# Patient Record
Sex: FEMALE | Race: WHITE | NOT HISPANIC OR LATINO | Employment: FULL TIME | URBAN - NONMETROPOLITAN AREA
[De-identification: names, ages, dates, MRNs, and addresses within clinical notes are randomized per-mention and may not be internally consistent; named-entity substitution may affect disease eponyms.]

---

## 2017-06-21 ENCOUNTER — OFFICE VISIT (OUTPATIENT)
Dept: FAMILY MEDICINE | Facility: OTHER | Age: 52
End: 2017-06-21
Attending: PHYSICIAN ASSISTANT
Payer: COMMERCIAL

## 2017-06-21 VITALS
HEART RATE: 66 BPM | HEIGHT: 68 IN | BODY MASS INDEX: 28.49 KG/M2 | DIASTOLIC BLOOD PRESSURE: 58 MMHG | WEIGHT: 188 LBS | TEMPERATURE: 98.2 F | OXYGEN SATURATION: 98 % | RESPIRATION RATE: 16 BRPM | SYSTOLIC BLOOD PRESSURE: 104 MMHG

## 2017-06-21 DIAGNOSIS — Z71.89 ACP (ADVANCE CARE PLANNING): Chronic | ICD-10-CM

## 2017-06-21 DIAGNOSIS — N94.6 DYSMENORRHEA: ICD-10-CM

## 2017-06-21 DIAGNOSIS — Z01.419 WELL WOMAN EXAM WITH ROUTINE GYNECOLOGICAL EXAM: ICD-10-CM

## 2017-06-21 DIAGNOSIS — Z23 IMMUNIZATION DUE: Primary | ICD-10-CM

## 2017-06-21 DIAGNOSIS — G47.00 INSOMNIA, UNSPECIFIED TYPE: ICD-10-CM

## 2017-06-21 LAB
ALBUMIN SERPL-MCNC: 3.9 G/DL (ref 3.4–5)
ALBUMIN UR-MCNC: NEGATIVE MG/DL
ALP SERPL-CCNC: 98 U/L (ref 40–150)
ALT SERPL W P-5'-P-CCNC: 33 U/L (ref 0–50)
ANION GAP SERPL CALCULATED.3IONS-SCNC: 6 MMOL/L (ref 3–14)
APPEARANCE UR: CLEAR
AST SERPL W P-5'-P-CCNC: 22 U/L (ref 0–45)
BACTERIA #/AREA URNS HPF: ABNORMAL /HPF
BASOPHILS # BLD AUTO: 0.1 10E9/L (ref 0–0.2)
BASOPHILS NFR BLD AUTO: 0.7 %
BILIRUB SERPL-MCNC: 0.4 MG/DL (ref 0.2–1.3)
BILIRUB UR QL STRIP: NEGATIVE
BUN SERPL-MCNC: 18 MG/DL (ref 7–30)
CALCIUM SERPL-MCNC: 8.6 MG/DL (ref 8.5–10.1)
CHLORIDE SERPL-SCNC: 106 MMOL/L (ref 94–109)
CHOLEST SERPL-MCNC: 170 MG/DL
CO2 SERPL-SCNC: 29 MMOL/L (ref 20–32)
COLOR UR AUTO: ABNORMAL
CREAT SERPL-MCNC: 0.78 MG/DL (ref 0.52–1.04)
DIFFERENTIAL METHOD BLD: NORMAL
EOSINOPHIL # BLD AUTO: 0.1 10E9/L (ref 0–0.7)
EOSINOPHIL NFR BLD AUTO: 2 %
ERYTHROCYTE [DISTWIDTH] IN BLOOD BY AUTOMATED COUNT: 12.7 % (ref 10–15)
GFR SERPL CREATININE-BSD FRML MDRD: 77 ML/MIN/1.7M2
GLUCOSE SERPL-MCNC: 88 MG/DL (ref 70–99)
GLUCOSE UR STRIP-MCNC: NEGATIVE MG/DL
HCT VFR BLD AUTO: 39.9 % (ref 35–47)
HDLC SERPL-MCNC: 67 MG/DL
HGB BLD-MCNC: 13.9 G/DL (ref 11.7–15.7)
HGB UR QL STRIP: ABNORMAL
IMM GRANULOCYTES # BLD: 0 10E9/L (ref 0–0.4)
IMM GRANULOCYTES NFR BLD: 0.3 %
KETONES UR STRIP-MCNC: NEGATIVE MG/DL
LDLC SERPL CALC-MCNC: 94 MG/DL
LEUKOCYTE ESTERASE UR QL STRIP: NEGATIVE
LYMPHOCYTES # BLD AUTO: 1.5 10E9/L (ref 0.8–5.3)
LYMPHOCYTES NFR BLD AUTO: 20.9 %
MCH RBC QN AUTO: 30.3 PG (ref 26.5–33)
MCHC RBC AUTO-ENTMCNC: 34.8 G/DL (ref 31.5–36.5)
MCV RBC AUTO: 87 FL (ref 78–100)
MICRO REPORT STATUS: NORMAL
MONOCYTES # BLD AUTO: 0.4 10E9/L (ref 0–1.3)
MONOCYTES NFR BLD AUTO: 6 %
NEUTROPHILS # BLD AUTO: 5 10E9/L (ref 1.6–8.3)
NEUTROPHILS NFR BLD AUTO: 70.1 %
NITRATE UR QL: NEGATIVE
NONHDLC SERPL-MCNC: 103 MG/DL
NRBC # BLD AUTO: 0 10*3/UL
NRBC BLD AUTO-RTO: 0 /100
PH UR STRIP: 7 PH (ref 4.7–8)
PLATELET # BLD AUTO: 245 10E9/L (ref 150–450)
POTASSIUM SERPL-SCNC: 3.9 MMOL/L (ref 3.4–5.3)
PROT SERPL-MCNC: 7.1 G/DL (ref 6.8–8.8)
RBC # BLD AUTO: 4.58 10E12/L (ref 3.8–5.2)
RBC #/AREA URNS AUTO: 4 /HPF (ref 0–2)
SODIUM SERPL-SCNC: 141 MMOL/L (ref 133–144)
SP GR UR STRIP: 1.01 (ref 1–1.03)
SPECIMEN SOURCE: NORMAL
TRIGL SERPL-MCNC: 45 MG/DL
TSH SERPL DL<=0.005 MIU/L-ACNC: 2.31 MU/L (ref 0.4–4)
URN SPEC COLLECT METH UR: ABNORMAL
UROBILINOGEN UR STRIP-MCNC: NORMAL MG/DL (ref 0–2)
WBC # BLD AUTO: 7.2 10E9/L (ref 4–11)
WBC #/AREA URNS AUTO: <1 /HPF (ref 0–2)
WET PREP SPEC: NORMAL

## 2017-06-21 PROCEDURE — 87624 HPV HI-RISK TYP POOLED RSLT: CPT | Mod: 90 | Performed by: PHYSICIAN ASSISTANT

## 2017-06-21 PROCEDURE — 90471 IMMUNIZATION ADMIN: CPT | Performed by: PHYSICIAN ASSISTANT

## 2017-06-21 PROCEDURE — 90715 TDAP VACCINE 7 YRS/> IM: CPT | Performed by: PHYSICIAN ASSISTANT

## 2017-06-21 PROCEDURE — 99000 SPECIMEN HANDLING OFFICE-LAB: CPT | Performed by: PHYSICIAN ASSISTANT

## 2017-06-21 PROCEDURE — G0123 SCREEN CERV/VAG THIN LAYER: HCPCS | Performed by: PHYSICIAN ASSISTANT

## 2017-06-21 PROCEDURE — 80061 LIPID PANEL: CPT | Performed by: PHYSICIAN ASSISTANT

## 2017-06-21 PROCEDURE — 36415 COLL VENOUS BLD VENIPUNCTURE: CPT | Performed by: PHYSICIAN ASSISTANT

## 2017-06-21 PROCEDURE — 99396 PREV VISIT EST AGE 40-64: CPT | Mod: 25 | Performed by: PHYSICIAN ASSISTANT

## 2017-06-21 PROCEDURE — 87210 SMEAR WET MOUNT SALINE/INK: CPT | Performed by: PHYSICIAN ASSISTANT

## 2017-06-21 PROCEDURE — 80050 GENERAL HEALTH PANEL: CPT | Performed by: PHYSICIAN ASSISTANT

## 2017-06-21 PROCEDURE — 86803 HEPATITIS C AB TEST: CPT | Mod: 90 | Performed by: PHYSICIAN ASSISTANT

## 2017-06-21 PROCEDURE — 81001 URINALYSIS AUTO W/SCOPE: CPT | Performed by: PHYSICIAN ASSISTANT

## 2017-06-21 RX ORDER — ZOLPIDEM TARTRATE 6.25 MG/1
6.25 TABLET, FILM COATED, EXTENDED RELEASE ORAL
Qty: 30 TABLET | Refills: 0 | Status: ON HOLD | OUTPATIENT
Start: 2017-06-21 | End: 2017-08-26

## 2017-06-21 ASSESSMENT — PATIENT HEALTH QUESTIONNAIRE - PHQ9: 5. POOR APPETITE OR OVEREATING: NOT AT ALL

## 2017-06-21 ASSESSMENT — ANXIETY QUESTIONNAIRES
6. BECOMING EASILY ANNOYED OR IRRITABLE: NOT AT ALL
7. FEELING AFRAID AS IF SOMETHING AWFUL MIGHT HAPPEN: NOT AT ALL
3. WORRYING TOO MUCH ABOUT DIFFERENT THINGS: NOT AT ALL
5. BEING SO RESTLESS THAT IT IS HARD TO SIT STILL: NOT AT ALL
2. NOT BEING ABLE TO STOP OR CONTROL WORRYING: NOT AT ALL
1. FEELING NERVOUS, ANXIOUS, OR ON EDGE: NOT AT ALL
GAD7 TOTAL SCORE: 0

## 2017-06-21 ASSESSMENT — PAIN SCALES - GENERAL: PAINLEVEL: NO PAIN (0)

## 2017-06-21 NOTE — NURSING NOTE
"Chief Complaint   Patient presents with     Physical       Initial /58 (BP Location: Left arm, Patient Position: Chair, Cuff Size: Adult Regular)  Pulse 66  Temp 98.2  F (36.8  C) (Tympanic)  Resp 16  Ht 5' 7.5\" (1.715 m)  Wt 188 lb (85.3 kg)  LMP 06/15/2017 (Exact Date)  SpO2 98%  BMI 29.01 kg/m2 Estimated body mass index is 29.01 kg/(m^2) as calculated from the following:    Height as of this encounter: 5' 7.5\" (1.715 m).    Weight as of this encounter: 188 lb (85.3 kg).  Medication Reconciliation: gem Peterson LPN    "
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
## 2017-06-21 NOTE — MR AVS SNAPSHOT
After Visit Summary   6/21/2017    Shweta Harris    MRN: [id number]           Patient Information     Date Of Birth          1965        Visit Information        Provider Department      6/21/2017 9:00 AM Antionette Edward PA [facility]        Today's Diagnoses     Immunization due    -  1    Well woman exam with routine gynecological exam        ACP (advance care planning)          Care Instructions      Preventive Health Recommendations  Female Ages 50 - 64    Yearly exam: See your health care provider every year in order to  o Review health changes.   o Discuss preventive care.    o Review your medicines if your doctor has prescribed any.      Get a Pap test every three years (unless you have an abnormal result and your provider advises testing more often).    If you get Pap tests with HPV test, you only need to test every 5 years, unless you have an abnormal result.     You do not need a Pap test if your uterus was removed (hysterectomy) and you have not had cancer.    You should be tested each year for STDs (sexually transmitted diseases) if you're at risk.     Have a mammogram every 1 to 2 years.    Have a colonoscopy at age 50, or have a yearly FIT test (stool test). These exams screen for colon cancer.      Have a cholesterol test every 5 years, or more often if advised.    Have a diabetes test (fasting glucose) every three years. If you are at risk for diabetes, you should have this test more often.     If you are at risk for osteoporosis (brittle bone disease), think about having a bone density scan (DEXA).    Shots: Get a flu shot each year. Get a tetanus shot every 10 years.    Nutrition:     Eat at least 5 servings of fruits and vegetables each day.    Eat whole-grain bread, whole-wheat pasta and brown rice instead of white grains and rice.    Talk to your provider about Calcium and Vitamin D.     Lifestyle    Exercise at least 150 minutes a week (30 minutes a day,  "5 days a week). This will help you control your weight and prevent disease.    Limit alcohol to one drink per day.    No smoking.     Wear sunscreen to prevent skin cancer.     See your dentist every six months for an exam and cleaning.    See your eye doctor every 1 to 2 years.            Follow-ups after your visit        Who to contact     If you have questions or need follow up information about today's clinic visit or your schedule please contact [facility] directly at [phone].  Normal or non-critical lab and imaging results will be communicated to you by [employer], letter or phone within 4 business days after the clinic has received the results. If you do not hear from us within 7 days, please contact the clinic through [employer] or phone. If you have a critical or abnormal lab result, we will notify you by phone as soon as possible.  Submit refill requests through [employer] or call your pharmacy and they will forward the refill request to us. Please allow 3 business days for your refill to be completed.          Additional Information About Your Visit        [employer] Information     [employer] gives you secure access to your electronic health record. If you see a primary care provider, you can also send messages to your care team and make appointments. If you have questions, please call your primary care clinic.  If you do not have a primary care provider, please call [phone] and they will assist you.        Care EveryWhere ID     This is your Care EveryWhere ID. This could be used by other organizations to access your [facility] medical records  [id number]        Your Vitals Were     Pulse Temperature Respirations Height Last Period Pulse Oximetry    66 98.2  F (36.8  C) (Tympanic) 16 5' 7.5\" (1.715 m) 06/15/2017 (Exact Date) 98%    BMI (Body Mass Index)                   29.01 kg/m2            Blood Pressure from Last 3 Encounters:   06/21/17 104/58   10/11/16 102/64   08/27/15 90/64    Weight " from Last 3 Encounters:   06/21/17 188 lb (85.3 kg)   10/11/16 190 lb (86.2 kg)   08/27/15 178 lb (80.7 kg)              We Performed the Following     A pap thin layer screen with  HPV - recommended age 30 - 65 years (select HPV order below)     CBC with platelets differential     Comprehensive metabolic panel     Hepatitis C antibody     Lipid Profile (RANGE)     TDAP VACCINE (ADACEL)     TSH with free T4 reflex     UA with Microscopic reflex to Culture     VACCINE ADMINISTRATION, INITIAL        Primary Care Provider Office Phone # Fax #    DESTIN Zuleta [phone]       [facility] [address]  [place] [address]        Equal Access to Services     JEANNE SOLIZ : Hadii merritt smith Sostephen, waaxda luqadaha, qaybta kaalmada adejoseph, ángela stephens . So [facility] [phone].    ATENCIÓN: Si habla español, tiene a boone disposición servicios gratuitos de asistencia lingüística. Llame al [phone].    We comply with applicable federal civil rights laws and [place] laws. We do not discriminate on the basis of race, color, national origin, age, disability sex, sexual orientation or gender identity.            Thank you!     Thank you for [facility]  for your [facility]. Our goal is always to provide you with excellent care. Hearing back from our patients is one way we can continue to improve our services. Please take a few minutes to complete the written survey that you may receive in the mail after your visit with us. Thank you!             Your Updated Medication List - Protect others around you: Learn how to safely use, store and throw away your medicines at www.disposemymeds.org.          This list is accurate as of: 6/21/17  9:47 AM.  Always use your most recent med list.                   Brand Name Dispense Instructions for use Diagnosis    zolpidem 6.25 MG CR tablet    AMBIEN CR    15 tablet    Take 1 tablet (6.25 mg) by  mouth nightly as needed for sleep    Insomnia, unspecified type

## 2017-06-22 ASSESSMENT — PATIENT HEALTH QUESTIONNAIRE - PHQ9: SUM OF ALL RESPONSES TO PHQ QUESTIONS 1-9: 4

## 2017-06-22 ASSESSMENT — ANXIETY QUESTIONNAIRES: GAD7 TOTAL SCORE: 0

## 2017-06-23 LAB — HCV AB SERPL QL IA: NORMAL

## 2017-06-27 LAB
COPATH REPORT: NORMAL
PAP: NORMAL

## 2017-06-28 LAB
FINAL DIAGNOSIS: NORMAL
HPV HR 12 DNA CVX QL NAA+PROBE: NEGATIVE
HPV16 DNA SPEC QL NAA+PROBE: NEGATIVE
HPV18 DNA SPEC QL NAA+PROBE: NEGATIVE
SPECIMEN DESCRIPTION: NORMAL

## 2017-07-07 ENCOUNTER — OFFICE VISIT (OUTPATIENT)
Dept: OBGYN | Facility: OTHER | Age: 52
End: 2017-07-07
Attending: OBSTETRICS & GYNECOLOGY
Payer: COMMERCIAL

## 2017-07-07 VITALS
DIASTOLIC BLOOD PRESSURE: 70 MMHG | SYSTOLIC BLOOD PRESSURE: 108 MMHG | HEART RATE: 68 BPM | OXYGEN SATURATION: 98 % | HEIGHT: 68 IN | BODY MASS INDEX: 28.49 KG/M2 | WEIGHT: 188 LBS

## 2017-07-07 DIAGNOSIS — N94.6 DYSMENORRHEA: ICD-10-CM

## 2017-07-07 PROCEDURE — 99213 OFFICE O/P EST LOW 20 MIN: CPT | Performed by: OBSTETRICS & GYNECOLOGY

## 2017-07-07 ASSESSMENT — PAIN SCALES - GENERAL: PAINLEVEL: NO PAIN (0)

## 2017-07-07 NOTE — PROGRESS NOTES
S:   Painful periods for a few years         Dyspareunia few years         Pelvic pain also in between periods           2 Children   Ages 38 and 36    Medical:  Nil of note  Surgical:  Tubal occlusion                   Colonoscopy   Diverticulitis    Occupation:  [employer]  Smokin  Alcohol:       0    O:  GC good       No pallor no cyanosis no jaundice       Abdomen soft  No mass liver spleen and kidneys not palpable    VE:  Vulva and vagina normal          Cervix grossly normal            Uterus anteverted normal size            Adnexa not enlarged and not tender    A:   Pelvic pain cause uncertain ? Endometriosis    P:    Pelvic ultrasound         Appointment to see me after ultrasound to discuss possible further Rx ? laparoscopy

## 2017-07-07 NOTE — MR AVS SNAPSHOT
"              After Visit Summary   7/7/2017    Shweta Harris    MRN: [id number]           Patient Information     Date Of Birth          1965        Visit Information        Provider Department      7/7/2017 11:00 AM Santhosh May MD [facility]        Today's Diagnoses     Dysmenorrhea           Follow-ups after your visit        Follow-up notes from your care team     Return in about 3 weeks (around 7/28/2017).      Who to contact     If you have questions or need follow up information about today's clinic visit or your schedule please contact [facility] directly at [phone].  Normal or non-critical lab and imaging results will be communicated to you by MyChart, letter or phone within 4 business days after the clinic has received the results. If you do not hear from us within 7 days, please contact the clinic through [employer] or phone. If you have a critical or abnormal lab result, we will notify you by phone as soon as possible.  Submit refill requests through [employer] or call your pharmacy and they will forward the refill request to us. Please allow 3 business days for your refill to be completed.          Additional Information About Your Visit        MyChart Information     State gives you secure access to your electronic health record. If you see a primary care provider, you can also send messages to your care team and make appointments. If you have questions, please call your primary care clinic.  If you do not have a primary care provider, please call [phone] and they will assist you.        Care EveryWhere ID     This is your Care EveryWhere ID. This could be used by other organizations to access your [facility] medical records  [id number]        Your Vitals Were     Pulse Height Last Period Pulse Oximetry BMI (Body Mass Index)       68 5' 7.5\" (1.715 m) 06/15/2017 (Exact Date) 98% 29.01 kg/m2        Blood Pressure from Last 3 Encounters:   07/07/17 108/70 "   06/21/17 104/58   10/11/16 102/64    Weight from Last 3 Encounters:   07/07/17 188 lb (85.3 kg)   06/21/17 188 lb (85.3 kg)   10/11/16 190 lb (86.2 kg)               Primary Care Provider Office Phone # Fax #    Antionette GLOVER DESTIN Edward [phone]       [facility] [address]  [place] [address]        Equal Access to Services     JEANNE SOLIZ : Hadii aad ku hadasho Soomaali, waaxda luqadaha, qaybta kaalmada adeegyada, waxay idiin hayaan adeeg kharash la'aan . So [facility] [phone].    ATENCIÓN: Si habla español, tiene a boone disposición servicios gratuitos de asistencia lingüística. [facility] [phone].    We comply with applicable federal civil rights laws and [place] laws. We do not discriminate on the basis of race, color, national origin, age, disability sex, sexual orientation or gender identity.            Thank you!     Thank you for choosing [facility] [place]  for your care. Our goal is always to provide you with excellent care. Hearing back from our patients is one way we can continue to improve our services. Please take a few minutes to complete the written survey that you may receive in the mail after your visit with us. Thank you!             Your Updated Medication List - Protect others around you: Learn how to safely use, store and throw away your medicines at www.disposemymeds.org.          This list is accurate as of: 7/7/17  4:49 PM.  Always use your most recent med list.                   Brand Name Dispense Instructions for use Diagnosis    zolpidem 6.25 MG CR tablet    AMBIEN CR    30 tablet    Take 1 tablet (6.25 mg) by mouth nightly as needed for sleep    Insomnia, unspecified type

## 2017-07-07 NOTE — NURSING NOTE
"Chief Complaint   Patient presents with     Consult     Consult/[place]/Dysmenorrhea       Initial /70 (BP Location: Left arm, Patient Position: Chair, Cuff Size: Adult Regular)  Pulse 68  Ht 5' 7.5\" (1.715 m)  Wt 188 lb (85.3 kg)  LMP 06/15/2017 (Exact Date)  SpO2 98%  BMI 29.01 kg/m2 Estimated body mass index is 29.01 kg/(m^2) as calculated from the following:    Height as of this encounter: 5' 7.5\" (1.715 m).    Weight as of this encounter: 188 lb (85.3 kg).  Medication Reconciliation: gem Valdovinos      "

## 2017-07-10 ENCOUNTER — HOSPITAL ENCOUNTER (OUTPATIENT)
Dept: ULTRASOUND IMAGING | Facility: HOSPITAL | Age: 52
Discharge: HOME | End: 2017-07-10
Attending: OBSTETRICS & GYNECOLOGY | Admitting: OBSTETRICS & GYNECOLOGY
Payer: COMMERCIAL

## 2017-07-10 PROCEDURE — 76830 TRANSVAGINAL US NON-OB: CPT | Mod: TC

## 2017-07-10 PROCEDURE — 76856 US EXAM PELVIC COMPLETE: CPT | Mod: TC

## 2017-07-28 ENCOUNTER — OFFICE VISIT (OUTPATIENT)
Dept: OBGYN | Facility: OTHER | Age: 52
End: 2017-07-28
Attending: OBSTETRICS & GYNECOLOGY
Payer: COMMERCIAL

## 2017-07-28 VITALS
OXYGEN SATURATION: 98 % | HEIGHT: 68 IN | WEIGHT: 188 LBS | HEART RATE: 93 BPM | DIASTOLIC BLOOD PRESSURE: 62 MMHG | BODY MASS INDEX: 28.49 KG/M2 | SYSTOLIC BLOOD PRESSURE: 110 MMHG

## 2017-07-28 DIAGNOSIS — R93.89 ENDOMETRIAL THICKENING ON ULTRA SOUND: Primary | ICD-10-CM

## 2017-07-28 DIAGNOSIS — N94.6 DYSMENORRHEA: ICD-10-CM

## 2017-07-28 PROCEDURE — 88305 TISSUE EXAM BY PATHOLOGIST: CPT | Mod: TC | Performed by: OBSTETRICS & GYNECOLOGY

## 2017-07-28 PROCEDURE — 99213 OFFICE O/P EST LOW 20 MIN: CPT | Mod: 25 | Performed by: OBSTETRICS & GYNECOLOGY

## 2017-07-28 PROCEDURE — 58100 BIOPSY OF UTERUS LINING: CPT | Performed by: OBSTETRICS & GYNECOLOGY

## 2017-07-28 ASSESSMENT — PAIN SCALES - GENERAL: PAINLEVEL: MILD PAIN (2)

## 2017-07-28 NOTE — MR AVS SNAPSHOT
After Visit Summary   7/28/2017    Shweta Harris    MRN: [id number]           Patient Information     Date Of Birth          1965        Visit Information        Provider Department      7/28/2017 2:45 PM Santhosh May MD [facility]        Today's Diagnoses     Endometrial thickening on ultra sound    -  1    Dysmenorrhea           Follow-ups after your visit        Your next 10 appointments already scheduled     Aug 10, 2017  9:45 AM CDT   (Arrive by 9:30 AM)   Pre-Op physical with DESTIN Zuleta   [facility] [place] [facility]    [address]  [place] [address]   [phone]              Who to contact     If you have questions or need follow up information about today's clinic visit or your schedule please contact [facility] directly at [phone].  Normal or non-critical lab and imaging results will be communicated to you by MyChart, letter or phone within 4 business days after the clinic has received the results. If you do not hear from us within 7 days, please contact the clinic through MyChart or phone. If you have a critical or abnormal lab result, we will notify you by phone as soon as possible.  Submit refill requests through [employer] or call your pharmacy and they will forward the refill request to us. Please allow 3 business days for your refill to be completed.          Additional Information About Your Visit        MyChart Information     [employer] gives you secure access to your electronic health record. If you see a primary care provider, you can also send messages to your care team and make appointments. If you have questions, please call your primary care clinic.  If you do not have a primary care provider, please call [phone] and they will assist you.        Care EveryWhere ID     This is your Care EveryWhere ID. This could be used by other organizations to access your [facility]  "records  [id number]        Your Vitals Were     Pulse Height Last Period Pulse Oximetry BMI (Body Mass Index)       93 5' 7.5\" (1.715 m) 07/12/2017 98% 29.01 kg/m2        Blood Pressure from Last 3 Encounters:   07/28/17 110/62   07/07/17 108/70   06/21/17 104/58    Weight from Last 3 Encounters:   07/28/17 188 lb (85.3 kg)   07/07/17 188 lb (85.3 kg)   06/21/17 188 lb (85.3 kg)              We Performed the Following     ENDOMETRIAL BIOPSY W/O CERVICAL DILATION     Surgical pathology exam        Primary Care Provider Office Phone # Fax #    DESTIN Zuleta [phone]       [facility] [address] [facility] [address]  [facility] [address]        Equal Access to Services     JEANNE SOLIZ : Hadii aad ku hadasho Soomaali, waaxda luqadaha, qaybta kaalmada adeegyada, ángela rogersin hayildefonso stephens . So [facility] [phone].    ATENCIÓN: Si habla español, tiene a boone disposición servicios gratuitos de asistencia lingüística. Olaf al [phone].    We comply with applicable federal civil rights laws and [place] laws. We do not discriminate on the basis of race, color, national origin, age, disability sex, sexual orientation or gender identity.            Thank you!     Thank you for choosing [facility]  for your care. Our goal is always to provide you with excellent care. Hearing back from our patients is one way we can continue to improve our services. Please take a few minutes to complete the written survey that you may receive in the mail after your visit with us. Thank you!             Your Updated Medication List - Protect others around you: Learn how to safely use, store and throw away your medicines at www.disposemymeds.org.          This list is accurate as of: 7/28/17 11:59 PM.  Always use your most recent med list.                   Brand Name Dispense Instructions for use Diagnosis    zolpidem 6.25 MG CR tablet    AMBIEN CR    30 tablet    Take 1 tablet (6.25 mg) by " mouth nightly as needed for sleep    Insomnia, unspecified type

## 2017-07-28 NOTE — PROGRESS NOTES
S:    Continues to have pelvic pain and severe dysmenorrhoea.  She has to take ansaids to cope with  The pain during her periods.    Ultrasound shows thickening of the endometrium which measures 2 cm. The myometrium measures 2 cm in diameter.  There is a 2 cm follicle of the left ovary    O:  Afebrile   No pallor no cyanosis no jaundice    Abdomen soft,  not distended, no mass,  No guarding    No rebound tenderness    VE:   Vulva and vagina normal   Uterus antevered normal size.  Adnexa not palpable.  No mass felt      Endometrial biopsy done for thickened endometrial stripe of 2 cm and also history of severe dysmenorrhoea    Endometrial biopsy done with Pipelle and aseptic technique and paracervical block with 5 ml of 2% lidocaine.    A:  Pelvic Pain with Severe dysmenorrhoea.      Possible  Adenomyosis/ endometriosis    P:   SIA BSO on August 23 2017

## 2017-07-28 NOTE — NURSING NOTE
"Chief Complaint   Patient presents with     RECHECK     Pelvic US F/U       Initial /62 (BP Location: Right arm, Patient Position: Chair, Cuff Size: Adult Regular)  Pulse 93  Ht 5' 7.5\" (1.715 m)  Wt 188 lb (85.3 kg)  LMP 07/12/2017  SpO2 98%  BMI 29.01 kg/m2 Estimated body mass index is 29.01 kg/(m^2) as calculated from the following:    Height as of this encounter: 5' 7.5\" (1.715 m).    Weight as of this encounter: 188 lb (85.3 kg).  Medication Reconciliation: gem Valdovinos      "

## 2017-07-31 DIAGNOSIS — N94.6 SEVERE DYSMENORRHEA: Primary | ICD-10-CM

## 2017-07-31 DIAGNOSIS — R10.2 PELVIC PAIN IN FEMALE: ICD-10-CM

## 2017-08-02 LAB — COPATH REPORT: NORMAL

## 2017-08-10 ENCOUNTER — OFFICE VISIT (OUTPATIENT)
Dept: FAMILY MEDICINE | Facility: OTHER | Age: 52
End: 2017-08-10
Attending: PHYSICIAN ASSISTANT
Payer: COMMERCIAL

## 2017-08-10 VITALS
OXYGEN SATURATION: 98 % | WEIGHT: 195 LBS | HEIGHT: 68 IN | DIASTOLIC BLOOD PRESSURE: 72 MMHG | HEART RATE: 62 BPM | SYSTOLIC BLOOD PRESSURE: 116 MMHG | BODY MASS INDEX: 29.55 KG/M2 | TEMPERATURE: 97.7 F

## 2017-08-10 DIAGNOSIS — Z01.818 PREOP GENERAL PHYSICAL EXAM: Primary | ICD-10-CM

## 2017-08-10 LAB
ALBUMIN UR-MCNC: NEGATIVE MG/DL
ANION GAP SERPL CALCULATED.3IONS-SCNC: 4 MMOL/L (ref 3–14)
APPEARANCE UR: CLEAR
BACTERIA #/AREA URNS HPF: ABNORMAL /HPF
BILIRUB UR QL STRIP: NEGATIVE
BUN SERPL-MCNC: 14 MG/DL (ref 7–30)
CALCIUM SERPL-MCNC: 8.9 MG/DL (ref 8.5–10.1)
CHLORIDE SERPL-SCNC: 109 MMOL/L (ref 94–109)
CO2 SERPL-SCNC: 27 MMOL/L (ref 20–32)
COLOR UR AUTO: ABNORMAL
CREAT SERPL-MCNC: 0.84 MG/DL (ref 0.52–1.04)
ERYTHROCYTE [DISTWIDTH] IN BLOOD BY AUTOMATED COUNT: 12.6 % (ref 10–15)
GFR SERPL CREATININE-BSD FRML MDRD: 71 ML/MIN/1.7M2
GLUCOSE SERPL-MCNC: 86 MG/DL (ref 70–99)
GLUCOSE UR STRIP-MCNC: NEGATIVE MG/DL
HCG UR QL: NEGATIVE
HCT VFR BLD AUTO: 39.3 % (ref 35–47)
HGB BLD-MCNC: 13.4 G/DL (ref 11.7–15.7)
HGB UR QL STRIP: ABNORMAL
KETONES UR STRIP-MCNC: NEGATIVE MG/DL
LEUKOCYTE ESTERASE UR QL STRIP: ABNORMAL
MCH RBC QN AUTO: 30 PG (ref 26.5–33)
MCHC RBC AUTO-ENTMCNC: 34.1 G/DL (ref 31.5–36.5)
MCV RBC AUTO: 88 FL (ref 78–100)
MUCOUS THREADS #/AREA URNS LPF: PRESENT /LPF
NITRATE UR QL: NEGATIVE
PH UR STRIP: 6.5 PH (ref 4.7–8)
PLATELET # BLD AUTO: 220 10E9/L (ref 150–450)
POTASSIUM SERPL-SCNC: 4.5 MMOL/L (ref 3.4–5.3)
RBC # BLD AUTO: 4.46 10E12/L (ref 3.8–5.2)
RBC #/AREA URNS AUTO: <1 /HPF (ref 0–2)
SODIUM SERPL-SCNC: 140 MMOL/L (ref 133–144)
SP GR UR STRIP: 1.01 (ref 1–1.03)
URN SPEC COLLECT METH UR: ABNORMAL
UROBILINOGEN UR STRIP-MCNC: NORMAL MG/DL (ref 0–2)
WBC # BLD AUTO: 6 10E9/L (ref 4–11)
WBC #/AREA URNS AUTO: <1 /HPF (ref 0–2)

## 2017-08-10 PROCEDURE — 81001 URINALYSIS AUTO W/SCOPE: CPT | Performed by: PHYSICIAN ASSISTANT

## 2017-08-10 PROCEDURE — 80048 BASIC METABOLIC PNL TOTAL CA: CPT | Performed by: PHYSICIAN ASSISTANT

## 2017-08-10 PROCEDURE — 99214 OFFICE O/P EST MOD 30 MIN: CPT | Performed by: PHYSICIAN ASSISTANT

## 2017-08-10 PROCEDURE — 81025 URINE PREGNANCY TEST: CPT | Performed by: PHYSICIAN ASSISTANT

## 2017-08-10 PROCEDURE — 85027 COMPLETE CBC AUTOMATED: CPT | Performed by: PHYSICIAN ASSISTANT

## 2017-08-10 PROCEDURE — 36415 COLL VENOUS BLD VENIPUNCTURE: CPT | Performed by: PHYSICIAN ASSISTANT

## 2017-08-10 ASSESSMENT — ANXIETY QUESTIONNAIRES
6. BECOMING EASILY ANNOYED OR IRRITABLE: NOT AT ALL
4. TROUBLE RELAXING: NOT AT ALL
1. FEELING NERVOUS, ANXIOUS, OR ON EDGE: NOT AT ALL
GAD7 TOTAL SCORE: 0
3. WORRYING TOO MUCH ABOUT DIFFERENT THINGS: NOT AT ALL
2. NOT BEING ABLE TO STOP OR CONTROL WORRYING: NOT AT ALL
7. FEELING AFRAID AS IF SOMETHING AWFUL MIGHT HAPPEN: NOT AT ALL
5. BEING SO RESTLESS THAT IT IS HARD TO SIT STILL: NOT AT ALL

## 2017-08-10 ASSESSMENT — PAIN SCALES - GENERAL: PAINLEVEL: NO PAIN (0)

## 2017-08-10 ASSESSMENT — PATIENT HEALTH QUESTIONNAIRE - PHQ9: SUM OF ALL RESPONSES TO PHQ QUESTIONS 1-9: 1

## 2017-08-10 NOTE — NURSING NOTE
"Chief Complaint   Patient presents with     Pre-Op Exam     for Dr. May at [facility] on 8/23/217 for LAPAROSCOPIC ASSISTED VAGINAL HYSTERECTOMY, BILATERAL SALPINGO-OOPHORECTOMY       Initial /72 (BP Location: Left arm, Patient Position: Chair, Cuff Size: Adult Regular)  Pulse 62  Temp 97.7  F (36.5  C) (Tympanic)  Ht 5' 7.5\" (1.715 m)  Wt 195 lb (88.5 kg)  LMP 07/12/2017  SpO2 98%  Breastfeeding? No  BMI 30.09 kg/m2 Estimated body mass index is 30.09 kg/(m^2) as calculated from the following:    Height as of this encounter: 5' 7.5\" (1.715 m).    Weight as of this encounter: 195 lb (88.5 kg).  Medication Reconciliation: complete   Bebe Javier CMA(AAMA)   "

## 2017-08-10 NOTE — MR AVS SNAPSHOT
After Visit Summary   8/10/2017    Shweta Harris    MRN: [id number]           Patient Information     Date Of Birth          1965        Visit Information        Provider Department      8/10/2017 9:45 AM Antionette Edward PA [facility]        Today's Diagnoses     Preop general physical exam    -  1      Care Instructions      Before Your Surgery      Call your surgeon if there is any change in your health. This includes signs of a cold or flu (such as a sore throat, runny nose, cough, rash or fever).    Do not smoke, drink alcohol or take over the counter medicine (unless your surgeon or primary care doctor tells you to) for the 24 hours before and after surgery.    If you take prescribed drugs: Follow your doctor s orders about which medicines to take and which to stop until after surgery.    Eating and drinking prior to surgery: follow the instructions from your surgeon    Take a shower or bath the night before surgery. Use the soap your surgeon gave you to gently clean your skin. If you do not have soap from your surgeon, use your regular soap. Do not shave or scrub the surgery site.  Wear clean pajamas and have clean sheets on your bed.           Follow-ups after your visit        Your next 10 appointments already scheduled     Aug 23, 2017   Procedure with Santhosh May MD   [facility] ([address] [id number] [phone]              Who to contact     If you have questions or need follow up information about today's clinic visit or your schedule please contact [facility] directly at [phone].  Normal or non-critical lab and imaging results will be communicated to you by MyChart, letter or phone within 4 business days after the clinic has received the results. If you do not hear from us within 7 days, please contact the clinic through MyChart or phone. If you have a critical or abnormal lab  "result, we will notify you by phone as soon as possible.  Submit refill requests through [employer] or call your pharmacy and they will forward the refill request to us. Please allow 3 business days for your refill to be completed.          Additional Information About Your Visit        [employer] Information     [employer] gives you secure access to your electronic health record. If you see a primary care provider, you can also send messages to your care team and make appointments. If you have questions, please call your primary care clinic.  If you do not have a primary care provider, please call [phone] and they will assist you.        Care EveryWhere ID     This is your Care EveryWhere ID. This could be used by other organizations to access your [place] medical records  [id number]        Your Vitals Were     Pulse Temperature Height Last Period Pulse Oximetry Breastfeeding?    62 97.7  F (36.5  C) (Tympanic) 5' 7.5\" (1.715 m) 07/12/2017 98% No    BMI (Body Mass Index)                   30.09 kg/m2            Blood Pressure from Last 3 Encounters:   08/10/17 116/72   07/28/17 110/62   07/07/17 108/70    Weight from Last 3 Encounters:   08/10/17 195 lb (88.5 kg)   07/28/17 188 lb (85.3 kg)   07/07/17 188 lb (85.3 kg)              We Performed the Following     Basic metabolic panel     CBC with platelets     HCG qualitative urine     UA with Microscopic reflex to Culture        Primary Care Provider Office Phone # Fax #    AntionetteDESTIN Champagne [phone]       [facility] [address] [place] [id number]        Equal Access to Services     [facility]: Hadii aad ku hadasho Soomaali, waaxda luqadaha, qaybta kaalmada adeegsami, ángela stephens . So [facility] [phone].    ATENCIÓN: Si habla español, tiene a boone disposición servicios gratuitos de asistencia lingüística. Llame al [phone].    We comply with applicable federal civil rights laws and " [place] laws. We do not discriminate on the basis of race, color, national origin, age, disability sex, sexual orientation or gender identity.            Thank you!     Thank you for choosing [facility]  for your care. Our goal is always to provide you with excellent care. Hearing back from our patients is one way we can continue to improve our services. Please take a few minutes to complete the written survey that you may receive in the mail after your visit with us. Thank you!             Your Updated Medication List - Protect others around you: Learn how to safely use, store and throw away your medicines at www.disposemymeds.org.          This list is accurate as of: 8/10/17 10:24 AM.  Always use your most recent med list.                   Brand Name Dispense Instructions for use Diagnosis    zolpidem 6.25 MG CR tablet    AMBIEN CR    30 tablet    Take 1 tablet (6.25 mg) by mouth nightly as needed for sleep    Insomnia, unspecified type

## 2017-08-10 NOTE — PROGRESS NOTES
[facility] [place]  [address]  [place] [address]  [phone]  Dept: [phone]    PRE-OP EVALUATION:  Today's date: 8/10/2017    Shweta Harris (: 1965) presents for pre-operative evaluation assessment as requested by Dr. May .  She requires evaluation and anesthesia risk assessment prior to undergoing surgery/procedure for treatment of LAPAROSCOPIC ASSISTED VAGINAL HYSTERECTOMY, BILATERAL SALPINGO-OOPHORECTOMY .  Proposed procedure: LAPAROSCOPIC ASSISTED VAGINAL HYSTERECTOMY, BILATERAL SALPINGO-OOPHORECTOMY    Date of Surgery/ Procedure: 17  Time of Surgery/ Procedure: [facility]   [facility] [facility]   Primary Physician: Antinoette Edward  Type of Anesthesia Anticipated: to be determined    Patient has a Health Care Directive or Living Will:  NO    Preop Questions 2017   1.  Do you have a history of heart attack, stroke, stent, bypass or surgery on an artery in the head, neck, heart or legs? No   2.  Do you ever have any pain or discomfort in your chest? No   3.  Do you have a history of  Heart Failure? No   4.   Are you troubled by shortness of breath when:  walking on a level surface, or up a slight hill, or at night? No   5.  Do you currently have a cold, bronchitis or other respiratory infection? No   6.  Do you have a cough, shortness of breath, or wheezing? No   7.  Do you sometimes get pains in the calves of your legs when you walk? No   8. Do you or anyone in your family have previous history of blood clots? YES -    9.  Do you or does anyone in your family have a serious bleeding problem such as prolonged bleeding following surgeries or cuts? No   10. Have you ever had problems with anemia or been told to take iron pills? No   11. Have you had any abnormal blood loss such as black, tarry or bloody stools, or abnormal vaginal bleeding? No   12. Have you ever had a blood transfusion? No   13. Have you or any of your relatives ever had problems with anesthesia? No    14. Do you have sleep apnea, excessive snoring or daytime drowsiness? No   15. Do you have any prosthetic heart valves? No   16. Do you have prosthetic joints? No   17. Is there any chance that you may be pregnant? No           HPI:                                                      Brief HPI related to upcoming procedure: she is needing to have a hysterectomy.   This will be done on August 23, 2017 at [facility] in [place]. Does have a hx of Factor V Leiden.  Is not on anything for this daily. [facility] and they did not recommend any special care except with surgery and needing treatment for DVT prophylaxis at that time as this is major abdominal surgery. We will refer to coumadin clinic and have her treat with Lovenox prior to and post surgery to prevent DVT.        See problem list for active medical problems.  Problems all longstanding and stable, except as noted/documented.  See ROS for pertinent symptoms related to these conditions.                                                                                                  .    MEDICAL HISTORY:                                                    Patient Active Problem List    Diagnosis Date Noted     ACP (advance care planning) 06/21/2017     Priority: Medium     Advance Care Planning 6/21/2017: ACP Review of Chart / Resources Provided:  Reviewed chart for advance care plan.  Shweta MORENO Harris has been provided information and resources to begin or update their advance care plan.  Added by Abril Peterson             Heterozygous factor V Leiden mutation (H) 10/09/2014     Priority: Medium      Past Medical History:   Diagnosis Date     Dermatitis fungal 6/30/2011     Factor V Leiden 6/28/2012     Family history of other blood disorders 6/30/2011     Heterozygous factor V Leiden mutation (H) 10/9/2014     Palpitations 9/22/2006     Past Surgical History:   Procedure Laterality Date     COLONOSCOPY N/A 12/1/2014     "Procedure: COLONOSCOPY;  Surgeon: Blanche Trivedi MD;  Location: [facility]     GYN SURGERY       tubal ligation  1991     Current Outpatient Prescriptions   Medication Sig Dispense Refill     zolpidem (AMBIEN CR) 6.25 MG CR tablet Take 1 tablet (6.25 mg) by mouth nightly as needed for sleep 30 tablet 0     OTC products: None, except as noted above    No Known Allergies   Latex Allergy: NO    Social History   Substance Use Topics     Smoking status: Never Smoker     Smokeless tobacco: Never Used      Comment: No passive exposure     Alcohol use Yes      Comment: Occasionally     History   Drug Use No       REVIEW OF SYSTEMS:                                                    C: NEGATIVE for fever, chills, change in weight  I: NEGATIVE for worrisome rashes, moles or lesions  E: NEGATIVE for vision changes or irritation  E/M: NEGATIVE for ear, mouth and throat problems  R: NEGATIVE for significant cough or SOB  B: NEGATIVE for masses, tenderness or discharge  CV: NEGATIVE for chest pain, palpitations or peripheral edema  GI: NEGATIVE for nausea, abdominal pain, heartburn, or change in bowel habits  : NEGATIVE for frequency, dysuria, or hematuria  M: NEGATIVE for significant arthralgias or myalgia  N: NEGATIVE for weakness, dizziness or paresthesias  E: NEGATIVE for temperature intolerance, skin/hair changes  H: NEGATIVE for bleeding problems  P: NEGATIVE for changes in mood or affect    EXAM:                                                    /72 (BP Location: Left arm, Patient Position: Chair, Cuff Size: Adult Regular)  Pulse 62  Temp 97.7  F (36.5  C) (Tympanic)  Ht 5' 7.5\" (1.715 m)  Wt 195 lb (88.5 kg)  LMP 07/12/2017  SpO2 98%  Breastfeeding? No  BMI 30.09 kg/m2    GENERAL APPEARANCE: healthy, alert and no distress     EYES: EOMI, PERRL     HENT: ear canals and TM's normal and nose and mouth without ulcers or lesions     NECK: no adenopathy, no asymmetry, masses, or scars and thyroid normal to " palpation     RESP: lungs clear to auscultation - no rales, rhonchi or wheezes     CV: regular rates and rhythm, normal S1 S2, no S3 or S4 and no murmur, click or rub     ABDOMEN:  soft, nontender, no HSM or masses and bowel sounds normal     GU_female: per gyn     MS: extremities normal- no gross deformities noted, no evidence of inflammation in joints, FROM in all extremities.     SKIN: no suspicious lesions or rashes     NEURO: Normal strength and tone, sensory exam grossly normal, mentation intact and speech normal     PSYCH: mentation appears normal. and affect normal/bright     LYMPHATICS: No axillary, cervical, or supraclavicular nodes    DIAGNOSTICS:                                                      No labs or EKG required for low risk surgery (cataract, skin procedure, breast biopsy, etc)  Labs Resulted Today:   Results for orders placed or performed in visit on 07/28/17   Surgical pathology exam   Result Value Ref Range    Copath Report       Patient Name: STEVE CRUM  MR#: [id number]  Specimen #: [id number]  Collected: 7/28/2017  Received: 7/31/2017  Reported: 8/2/2017 16:33  Ordering Phy(s): GRADY MAJOR  Additional Phy(s): SURJIT ALY    For improved result formatting, select 'View Enhanced Report Format'  under Linked Documents section.    SPECIMEN(S):  Endometrial biopsy    FINAL DIAGNOSIS:  Uterus, endometrium, biopsy  - Secretory endometrium consistent with day 17    Electronically signed out by:    dEuardo Phoenix M.D.    CLINICAL HISTORY:  Thickened endometruim [sic] on ultrasound.    GROSS:  There are cylinders of tan and maroon soft tissue which are 2 x 2 x 0.3  cm in aggregate. (TE in 1 block). (Dictated by: Eduardo Phoenix MD  8/1/2017 04:41 PM)    MICROSCOPIC:  Microscopic sections show secretory endometrium.  The nuclei are basally  placed.  The stroma is loose.    CPT Codes  A: 07346-DT1    TESTING LAB LOCATION:  [address]  [address]  [phone]     COLLECTION SITE:  Client: [facility]  Location: [facility] ([facility])         Recent Labs   Lab Test  06/21/17   1020  05/07/14   1115   HGB  13.9   --    PLT  245   --    NA  141  140   POTASSIUM  3.9  4.4   CR  0.78  0.87        IMPRESSION:                                                    Reason for surgery/procedure: complete vaginal hysterectomy   Diagnosis/reason for consult: medical clearance.     The proposed surgical procedure is considered INTERMEDIATE risk.    REVISED CARDIAC RISK INDEX  The patient has the following serious cardiovascular risks for perioperative complications such as (MI, PE, VFib and 3  AV Block):  No serious cardiac risks  INTERPRETATION: 1 risks: Class II (low risk - 0.9% complication rate)    The patient has the following additional risks for perioperative complications:  No identified additional risks        RECOMMENDATIONS:                                                      --Consult hospital rounder / IM to assist post-op medical management  Spoke with Dr Landa and she will be on that day of surgery.  Will be notified on her admission of surgery to start Lovenox post op.       --Patient is to take all scheduled medications on the day of surgery EXCEPT for modifications listed below.    Anticoagulant or Antiplatelet Medication Use  Due to her hx of Factor V Leiden had consult in past with hematology and recommended Lovenox for any abdominal surgery to prevent DVT.  She has never had a DVT but has first degree relative with this.       APPROVAL GIVEN to proceed with proposed procedure, without further diagnostic evaluation       Signed Electronically by: DESTIN Elizabeth    Copy of this evaluation report is provided to requesting physician.    [facility] Preop Guidelines

## 2017-08-11 ASSESSMENT — ANXIETY QUESTIONNAIRES: GAD7 TOTAL SCORE: 0

## 2017-08-17 NOTE — H&P (VIEW-ONLY)
[facility] [place]  [address]  [place] [address]  [phone]  Dept: [phone]    PRE-OP EVALUATION:  Today's date: 8/10/2017    Shweta Hraris (: 1965) presents for pre-operative evaluation assessment as requested by Dr. May .  She requires evaluation and anesthesia risk assessment prior to undergoing surgery/procedure for treatment of LAPAROSCOPIC ASSISTED VAGINAL HYSTERECTOMY, BILATERAL SALPINGO-OOPHORECTOMY .  Proposed procedure: LAPAROSCOPIC ASSISTED VAGINAL HYSTERECTOMY, BILATERAL SALPINGO-OOPHORECTOMY    Date of Surgery/ Procedure: 17  Time of Surgery/ Procedure: [facility]   Hospital/Surgical Facility: [facility] [place]   Primary Physician: Antionette Edward  Type of Anesthesia Anticipated: to be determined    Patient has a Health Care Directive or Living Will:  NO    Preop Questions 2017   1.  Do you have a history of heart attack, stroke, stent, bypass or surgery on an artery in the head, neck, heart or legs? No   2.  Do you ever have any pain or discomfort in your chest? No   3.  Do you have a history of  Heart Failure? No   4.   Are you troubled by shortness of breath when:  walking on a level surface, or up a slight hill, or at night? No   5.  Do you currently have a cold, bronchitis or other respiratory infection? No   6.  Do you have a cough, shortness of breath, or wheezing? No   7.  Do you sometimes get pains in the calves of your legs when you walk? No   8. Do you or anyone in your family have previous history of blood clots? YES -    9.  Do you or does anyone in your family have a serious bleeding problem such as prolonged bleeding following surgeries or cuts? No   10. Have you ever had problems with anemia or been told to take iron pills? No   11. Have you had any abnormal blood loss such as black, tarry or bloody stools, or abnormal vaginal bleeding? No   12. Have you ever had a blood transfusion? No   13. Have you or any of your relatives ever had problems with anesthesia? No    14. Do you have sleep apnea, excessive snoring or daytime drowsiness? No   15. Do you have any prosthetic heart valves? No   16. Do you have prosthetic joints? No   17. Is there any chance that you may be pregnant? No           HPI:                                                      Brief HPI related to upcoming procedure: she is needing to have a hysterectomy.   This will be done on August 23, 2017 at [facility] in [place]. Does have a hx of Factor V Leiden.  Is not on anything for this daily. Saw H[facility] ([facility] and they did not recommend any special care except with surgery and needing treatment for DVT prophylaxis at that time as this is major abdominal surgery. We will refer to coumadin clinic and have her treat with Lovenox prior to and post surgery to prevent DVT.        See problem list for active medical problems.  Problems all longstanding and stable, except as noted/documented.  See ROS for pertinent symptoms related to these conditions.                                                                                                  .    MEDICAL HISTORY:                                                    Patient Active Problem List    Diagnosis Date Noted     ACP (advance care planning) 06/21/2017     Priority: Medium     Advance Care Planning 6/21/2017: ACP Review of Chart / Resources Provided:  Reviewed chart for advance care plan.  Shweta MORENO Harris has been provided information and resources to begin or update their advance care plan.  Added by Abril Peterson             Heterozygous factor V Leiden mutation (H) 10/09/2014     Priority: Medium      Past Medical History:   Diagnosis Date     Dermatitis fungal 6/30/2011     Factor V Leiden 6/28/2012     Family history of other blood disorders 6/30/2011     Heterozygous factor V Leiden mutation (H) 10/9/2014     Palpitations 9/22/2006     Past Surgical History:   Procedure Laterality Date     COLONOSCOPY N/A 12/1/2014     "Procedure: COLONOSCOPY;  Surgeon: Blanche Trivedi MD;  Location: [facility] OR     GYN SURGERY       tubal ligation  1991     Current Outpatient Prescriptions   Medication Sig Dispense Refill     zolpidem (AMBIEN CR) 6.25 MG CR tablet Take 1 tablet (6.25 mg) by mouth nightly as needed for sleep 30 tablet 0     OTC products: None, except as noted above    No Known Allergies   Latex Allergy: NO    Social History   Substance Use Topics     Smoking status: Never Smoker     Smokeless tobacco: Never Used      Comment: No passive exposure     Alcohol use Yes      Comment: Occasionally     History   Drug Use No       REVIEW OF SYSTEMS:                                                    C: NEGATIVE for fever, chills, change in weight  I: NEGATIVE for worrisome rashes, moles or lesions  E: NEGATIVE for vision changes or irritation  E/M: NEGATIVE for ear, mouth and throat problems  R: NEGATIVE for significant cough or SOB  B: NEGATIVE for masses, tenderness or discharge  CV: NEGATIVE for chest pain, palpitations or peripheral edema  GI: NEGATIVE for nausea, abdominal pain, heartburn, or change in bowel habits  : NEGATIVE for frequency, dysuria, or hematuria  M: NEGATIVE for significant arthralgias or myalgia  N: NEGATIVE for weakness, dizziness or paresthesias  E: NEGATIVE for temperature intolerance, skin/hair changes  H: NEGATIVE for bleeding problems  P: NEGATIVE for changes in mood or affect    EXAM:                                                    /72 (BP Location: Left arm, Patient Position: Chair, Cuff Size: Adult Regular)  Pulse 62  Temp 97.7  F (36.5  C) (Tympanic)  Ht 5' 7.5\" (1.715 m)  Wt 195 lb (88.5 kg)  LMP 07/12/2017  SpO2 98%  Breastfeeding? No  BMI 30.09 kg/m2    GENERAL APPEARANCE: healthy, alert and no distress     EYES: EOMI, PERRL     HENT: ear canals and TM's normal and nose and mouth without ulcers or lesions     NECK: no adenopathy, no asymmetry, masses, or scars and thyroid normal to " palpation     RESP: lungs clear to auscultation - no rales, rhonchi or wheezes     CV: regular rates and rhythm, normal S1 S2, no S3 or S4 and no murmur, click or rub     ABDOMEN:  soft, nontender, no HSM or masses and bowel sounds normal     GU_female: per gyn     MS: extremities normal- no gross deformities noted, no evidence of inflammation in joints, FROM in all extremities.     SKIN: no suspicious lesions or rashes     NEURO: Normal strength and tone, sensory exam grossly normal, mentation intact and speech normal     PSYCH: mentation appears normal. and affect normal/bright     LYMPHATICS: No axillary, cervical, or supraclavicular nodes    DIAGNOSTICS:                                                      No labs or EKG required for low risk surgery (cataract, skin procedure, breast biopsy, etc)  Labs Resulted Today:   Results for orders placed or performed in visit on 07/28/17   Surgical pathology exam   Result Value Ref Range    Copath Report       Patient Name: STEVE CRUM  MR#: [id number]  Specimen #: [id number]  Collected: 7/28/2017  Received: 7/31/2017  Reported: 8/2/2017 16:33  Ordering Phy(s): GRADY MAJOR  Additional Phy(s): SURJIT ALY    For improved result formatting, select 'View Enhanced Report Format'  under Linked Documents section.    SPECIMEN(S):  Endometrial biopsy    FINAL DIAGNOSIS:  Uterus, endometrium, biopsy  - Secretory endometrium consistent with day 17    Electronically signed out by:    Eduardo Phoenix M.D.    CLINICAL HISTORY:  Thickened endometruim [sic] on ultrasound.    GROSS:  There are cylinders of tan and maroon soft tissue which are 2 x 2 x 0.3  cm in aggregate. (TE in 1 block). (Dictated by: Eduardo Phoenix MD  8/1/2017 04:41 PM)    MICROSCOPIC:  Microscopic sections show secretory endometrium.  The nuclei are basally  placed.  The stroma is loose.    CPT Codes  A: 92379-WA9    TESTING LAB LOCATION:  [address]  [address]  [phone]     COLLECTION SITE:  Client: [facility]  Location: [facility])         Recent Labs   Lab Test  06/21/17   1020  05/07/14   1115   HGB  13.9   --    PLT  245   --    NA  141  140   POTASSIUM  3.9  4.4   CR  0.78  0.87        IMPRESSION:                                                    Reason for surgery/procedure: complete vaginal hysterectomy   Diagnosis/reason for consult: medical clearance.     The proposed surgical procedure is considered INTERMEDIATE risk.    REVISED CARDIAC RISK INDEX  The patient has the following serious cardiovascular risks for perioperative complications such as (MI, PE, VFib and 3  AV Block):  No serious cardiac risks  INTERPRETATION: 1 risks: Class II (low risk - 0.9% complication rate)    The patient has the following additional risks for perioperative complications:  No identified additional risks        RECOMMENDATIONS:                                                      --Consult hospital rounder / IM to assist post-op medical management  Spoke with Dr Landa and she will be on that day of surgery.  Will be notified on her admission of surgery to start Lovenox post op.       --Patient is to take all scheduled medications on the day of surgery EXCEPT for modifications listed below.    Anticoagulant or Antiplatelet Medication Use  Due to her hx of Factor V Leiden had consult in past with hematology and recommended Lovenox for any abdominal surgery to prevent DVT.  She has never had a DVT but has first degree relative with this.       APPROVAL GIVEN to proceed with proposed procedure, without further diagnostic evaluation       Signed Electronically by: DESTIN Elizabeth    Copy of this evaluation report is provided to requesting physician.    [place] Preop Guidelines

## 2017-08-17 NOTE — OR NURSING
Hi Francy,   You probably already know about this but I am notifying you like we always do!      From the beginning of the H & P:  Brief HPI related to upcoming procedure: she is needing to have a hysterectomy.   This will be done on August 23, 2017 at [facility] in [place]. Does have a hx of Factor V Leiden.  Is not on anything for this daily. Saw Hematology (Dr Kelsi Dior in [place]) and they did not recommend any special care except with surgery and needing treatment for DVT prophylaxis at that time as this is major abdominal surgery. We will refer to coumadin clinic and have her treat with Lovenox prior to and post surgery to prevent DVT.    From the end of the H & P:        Anticoagulant or Antiplatelet Medication Use  Due to her hx of Factor V Leiden had consult in past with hematology and recommended Lovenox for any abdominal surgery to prevent DVT.  She has never had a DVT but has first degree relative with this.       APPROVAL GIVEN to proceed with proposed procedure, without further diagnostic evaluation         Signed Electronically by: DESTIN Elizabeth    Notified Coumadin Clinic.

## 2017-08-23 ENCOUNTER — ANESTHESIA (OUTPATIENT)
Dept: SURGERY | Facility: HOSPITAL | Age: 52
DRG: 742 | End: 2017-08-23
Payer: COMMERCIAL

## 2017-08-23 ENCOUNTER — HOSPITAL ENCOUNTER (INPATIENT)
Facility: HOSPITAL | Age: 52
LOS: 3 days | Discharge: HOME | DRG: 742 | End: 2017-08-26
Attending: OBSTETRICS & GYNECOLOGY | Admitting: OBSTETRICS & GYNECOLOGY
Payer: COMMERCIAL

## 2017-08-23 ENCOUNTER — SURGERY (OUTPATIENT)
Age: 52
End: 2017-08-23

## 2017-08-23 ENCOUNTER — ANESTHESIA EVENT (OUTPATIENT)
Dept: SURGERY | Facility: HOSPITAL | Age: 52
DRG: 742 | End: 2017-08-23
Payer: COMMERCIAL

## 2017-08-23 DIAGNOSIS — D68.51 HETEROZYGOUS FACTOR V LEIDEN MUTATION (H): Primary | ICD-10-CM

## 2017-08-23 LAB
ABO + RH BLD: NORMAL
ABO + RH BLD: NORMAL
BLD GP AB SCN SERPL QL: NORMAL
BLOOD BANK CMNT PATIENT-IMP: NORMAL
CREAT SERPL-MCNC: 0.84 MG/DL (ref 0.52–1.04)
GFR SERPL CREATININE-BSD FRML MDRD: 70 ML/MIN/1.7M2
HCG UR QL: NEGATIVE
PLATELET # BLD AUTO: 211 10E9/L (ref 150–450)
SPECIMEN EXP DATE BLD: NORMAL

## 2017-08-23 PROCEDURE — 25000125 ZZHC RX 250: Performed by: OBSTETRICS & GYNECOLOGY

## 2017-08-23 PROCEDURE — 86900 BLOOD TYPING SEROLOGIC ABO: CPT | Performed by: OBSTETRICS & GYNECOLOGY

## 2017-08-23 PROCEDURE — 36000056 ZZH SURGERY LEVEL 3 1ST 30 MIN: Performed by: OBSTETRICS & GYNECOLOGY

## 2017-08-23 PROCEDURE — 58260 VAGINAL HYSTERECTOMY: CPT | Performed by: ANESTHESIOLOGY

## 2017-08-23 PROCEDURE — 25000125 ZZHC RX 250: Performed by: ANESTHESIOLOGY

## 2017-08-23 PROCEDURE — 37000008 ZZH ANESTHESIA TECHNICAL FEE, 1ST 30 MIN: Performed by: OBSTETRICS & GYNECOLOGY

## 2017-08-23 PROCEDURE — 25000128 H RX IP 250 OP 636: Performed by: NURSE ANESTHETIST, CERTIFIED REGISTERED

## 2017-08-23 PROCEDURE — 36000058 ZZH SURGERY LEVEL 3 EA 15 ADDTL MIN: Performed by: OBSTETRICS & GYNECOLOGY

## 2017-08-23 PROCEDURE — 25000132 ZZH RX MED GY IP 250 OP 250 PS 637: Performed by: OBSTETRICS & GYNECOLOGY

## 2017-08-23 PROCEDURE — 40000305 ZZH STATISTIC PRE PROC ASSESS I: Performed by: OBSTETRICS & GYNECOLOGY

## 2017-08-23 PROCEDURE — 25000125 ZZHC RX 250: Performed by: NURSE ANESTHETIST, CERTIFIED REGISTERED

## 2017-08-23 PROCEDURE — 12000000 ZZH R&B MED SURG/OB

## 2017-08-23 PROCEDURE — 0UT70ZZ RESECTION OF BILATERAL FALLOPIAN TUBES, OPEN APPROACH: ICD-10-PCS | Performed by: OBSTETRICS & GYNECOLOGY

## 2017-08-23 PROCEDURE — 27110028 ZZH OR GENERAL SUPPLY NON-STERILE: Performed by: OBSTETRICS & GYNECOLOGY

## 2017-08-23 PROCEDURE — 71000015 ZZH RECOVERY PHASE 1 LEVEL 2 EA ADDTL HR: Performed by: OBSTETRICS & GYNECOLOGY

## 2017-08-23 PROCEDURE — 71000014 ZZH RECOVERY PHASE 1 LEVEL 2 FIRST HR: Performed by: OBSTETRICS & GYNECOLOGY

## 2017-08-23 PROCEDURE — 01999 UNLISTED ANES PROCEDURE: CPT | Performed by: NURSE ANESTHETIST, CERTIFIED REGISTERED

## 2017-08-23 PROCEDURE — 25000566 ZZH SEVOFLURANE, EA 15 MIN: Performed by: ANESTHESIOLOGY

## 2017-08-23 PROCEDURE — 86901 BLOOD TYPING SEROLOGIC RH(D): CPT | Performed by: OBSTETRICS & GYNECOLOGY

## 2017-08-23 PROCEDURE — 82565 ASSAY OF CREATININE: CPT | Performed by: OBSTETRICS & GYNECOLOGY

## 2017-08-23 PROCEDURE — 25000128 H RX IP 250 OP 636: Performed by: ANESTHESIOLOGY

## 2017-08-23 PROCEDURE — 88305 TISSUE EXAM BY PATHOLOGIST: CPT | Mod: TC | Performed by: OBSTETRICS & GYNECOLOGY

## 2017-08-23 PROCEDURE — 25000128 H RX IP 250 OP 636: Performed by: OBSTETRICS & GYNECOLOGY

## 2017-08-23 PROCEDURE — 0UT20ZZ RESECTION OF BILATERAL OVARIES, OPEN APPROACH: ICD-10-PCS | Performed by: OBSTETRICS & GYNECOLOGY

## 2017-08-23 PROCEDURE — 27210794 ZZH OR GENERAL SUPPLY STERILE: Performed by: OBSTETRICS & GYNECOLOGY

## 2017-08-23 PROCEDURE — 85049 AUTOMATED PLATELET COUNT: CPT | Performed by: OBSTETRICS & GYNECOLOGY

## 2017-08-23 PROCEDURE — 88309 TISSUE EXAM BY PATHOLOGIST: CPT | Mod: TC | Performed by: OBSTETRICS & GYNECOLOGY

## 2017-08-23 PROCEDURE — 27210995 ZZH RX 272: Performed by: OBSTETRICS & GYNECOLOGY

## 2017-08-23 PROCEDURE — 36415 COLL VENOUS BLD VENIPUNCTURE: CPT | Performed by: OBSTETRICS & GYNECOLOGY

## 2017-08-23 PROCEDURE — 58150 TOTAL HYSTERECTOMY: CPT | Performed by: OBSTETRICS & GYNECOLOGY

## 2017-08-23 PROCEDURE — 0UT90ZZ RESECTION OF UTERUS, OPEN APPROACH: ICD-10-PCS | Performed by: OBSTETRICS & GYNECOLOGY

## 2017-08-23 PROCEDURE — 0UTC0ZZ RESECTION OF CERVIX, OPEN APPROACH: ICD-10-PCS | Performed by: OBSTETRICS & GYNECOLOGY

## 2017-08-23 PROCEDURE — 86850 RBC ANTIBODY SCREEN: CPT | Performed by: OBSTETRICS & GYNECOLOGY

## 2017-08-23 PROCEDURE — 37000009 ZZH ANESTHESIA TECHNICAL FEE, EACH ADDTL 15 MIN: Performed by: OBSTETRICS & GYNECOLOGY

## 2017-08-23 PROCEDURE — 25000132 ZZH RX MED GY IP 250 OP 250 PS 637: Performed by: NURSE ANESTHETIST, CERTIFIED REGISTERED

## 2017-08-23 PROCEDURE — S0020 INJECTION, BUPIVICAINE HYDRO: HCPCS | Performed by: OBSTETRICS & GYNECOLOGY

## 2017-08-23 PROCEDURE — 81025 URINE PREGNANCY TEST: CPT | Performed by: ANESTHESIOLOGY

## 2017-08-23 RX ORDER — ACETAMINOPHEN 325 MG/1
975 TABLET ORAL EVERY 8 HOURS
Status: COMPLETED | OUTPATIENT
Start: 2017-08-23 | End: 2017-08-26

## 2017-08-23 RX ORDER — ALBUTEROL SULFATE 90 UG/1
AEROSOL, METERED RESPIRATORY (INHALATION) PRN
Status: DISCONTINUED | OUTPATIENT
Start: 2017-08-23 | End: 2017-08-23

## 2017-08-23 RX ORDER — ONDANSETRON 2 MG/ML
4 INJECTION INTRAMUSCULAR; INTRAVENOUS EVERY 30 MIN PRN
Status: DISCONTINUED | OUTPATIENT
Start: 2017-08-23 | End: 2017-08-23 | Stop reason: HOSPADM

## 2017-08-23 RX ORDER — MEPERIDINE HYDROCHLORIDE 25 MG/ML
12.5 INJECTION INTRAMUSCULAR; INTRAVENOUS; SUBCUTANEOUS
Status: DISCONTINUED | OUTPATIENT
Start: 2017-08-23 | End: 2017-08-23 | Stop reason: HOSPADM

## 2017-08-23 RX ORDER — ONDANSETRON 4 MG/1
4 TABLET, ORALLY DISINTEGRATING ORAL EVERY 30 MIN PRN
Status: DISCONTINUED | OUTPATIENT
Start: 2017-08-23 | End: 2017-08-23 | Stop reason: HOSPADM

## 2017-08-23 RX ORDER — DEXAMETHASONE SODIUM PHOSPHATE 10 MG/ML
INJECTION, SOLUTION INTRAMUSCULAR; INTRAVENOUS PRN
Status: DISCONTINUED | OUTPATIENT
Start: 2017-08-23 | End: 2017-08-23

## 2017-08-23 RX ORDER — FENTANYL CITRATE 50 UG/ML
25-50 INJECTION, SOLUTION INTRAMUSCULAR; INTRAVENOUS
Status: DISCONTINUED | OUTPATIENT
Start: 2017-08-23 | End: 2017-08-23 | Stop reason: HOSPADM

## 2017-08-23 RX ORDER — BACITRACIN ZINC 500 [USP'U]/G
OINTMENT TOPICAL PRN
Status: DISCONTINUED | OUTPATIENT
Start: 2017-08-23 | End: 2017-08-23 | Stop reason: HOSPADM

## 2017-08-23 RX ORDER — HYDROMORPHONE HYDROCHLORIDE 1 MG/ML
.3-.5 INJECTION, SOLUTION INTRAMUSCULAR; INTRAVENOUS; SUBCUTANEOUS
Status: DISCONTINUED | OUTPATIENT
Start: 2017-08-23 | End: 2017-08-26 | Stop reason: HOSPADM

## 2017-08-23 RX ORDER — GLYCOPYRROLATE 0.2 MG/ML
INJECTION, SOLUTION INTRAMUSCULAR; INTRAVENOUS PRN
Status: DISCONTINUED | OUTPATIENT
Start: 2017-08-23 | End: 2017-08-23

## 2017-08-23 RX ORDER — NALOXONE HYDROCHLORIDE 0.4 MG/ML
.1-.4 INJECTION, SOLUTION INTRAMUSCULAR; INTRAVENOUS; SUBCUTANEOUS
Status: DISCONTINUED | OUTPATIENT
Start: 2017-08-23 | End: 2017-08-26 | Stop reason: HOSPADM

## 2017-08-23 RX ORDER — ACETAMINOPHEN 325 MG/1
650 TABLET ORAL EVERY 4 HOURS PRN
Status: DISCONTINUED | OUTPATIENT
Start: 2017-08-26 | End: 2017-08-26 | Stop reason: HOSPADM

## 2017-08-23 RX ORDER — DEXAMETHASONE SODIUM PHOSPHATE 4 MG/ML
4 INJECTION, SOLUTION INTRA-ARTICULAR; INTRALESIONAL; INTRAMUSCULAR; INTRAVENOUS; SOFT TISSUE EVERY 10 MIN PRN
Status: DISCONTINUED | OUTPATIENT
Start: 2017-08-23 | End: 2017-08-23 | Stop reason: HOSPADM

## 2017-08-23 RX ORDER — PROCHLORPERAZINE MALEATE 5 MG
5-10 TABLET ORAL EVERY 6 HOURS PRN
Status: DISCONTINUED | OUTPATIENT
Start: 2017-08-23 | End: 2017-08-26 | Stop reason: HOSPADM

## 2017-08-23 RX ORDER — ONDANSETRON 4 MG/1
4 TABLET, ORALLY DISINTEGRATING ORAL EVERY 6 HOURS PRN
Status: DISCONTINUED | OUTPATIENT
Start: 2017-08-23 | End: 2017-08-26 | Stop reason: HOSPADM

## 2017-08-23 RX ORDER — LABETALOL HYDROCHLORIDE 5 MG/ML
10 INJECTION, SOLUTION INTRAVENOUS
Status: DISCONTINUED | OUTPATIENT
Start: 2017-08-23 | End: 2017-08-23 | Stop reason: HOSPADM

## 2017-08-23 RX ORDER — PROPOFOL 10 MG/ML
INJECTION, EMULSION INTRAVENOUS PRN
Status: DISCONTINUED | OUTPATIENT
Start: 2017-08-23 | End: 2017-08-23

## 2017-08-23 RX ORDER — SODIUM CHLORIDE, SODIUM LACTATE, POTASSIUM CHLORIDE, CALCIUM CHLORIDE 600; 310; 30; 20 MG/100ML; MG/100ML; MG/100ML; MG/100ML
INJECTION, SOLUTION INTRAVENOUS CONTINUOUS
Status: DISCONTINUED | OUTPATIENT
Start: 2017-08-23 | End: 2017-08-23 | Stop reason: HOSPADM

## 2017-08-23 RX ORDER — HYDRALAZINE HYDROCHLORIDE 20 MG/ML
2.5-5 INJECTION INTRAMUSCULAR; INTRAVENOUS EVERY 10 MIN PRN
Status: DISCONTINUED | OUTPATIENT
Start: 2017-08-23 | End: 2017-08-23 | Stop reason: HOSPADM

## 2017-08-23 RX ORDER — NEOSTIGMINE METHYLSULFATE 1 MG/ML
VIAL (ML) INJECTION PRN
Status: DISCONTINUED | OUTPATIENT
Start: 2017-08-23 | End: 2017-08-23

## 2017-08-23 RX ORDER — ONDANSETRON 2 MG/ML
4 INJECTION INTRAMUSCULAR; INTRAVENOUS EVERY 6 HOURS PRN
Status: DISCONTINUED | OUTPATIENT
Start: 2017-08-23 | End: 2017-08-26 | Stop reason: HOSPADM

## 2017-08-23 RX ORDER — DIAZEPAM 5 MG
5 TABLET ORAL EVERY 8 HOURS PRN
Status: DISCONTINUED | OUTPATIENT
Start: 2017-08-23 | End: 2017-08-26 | Stop reason: HOSPADM

## 2017-08-23 RX ORDER — OXYCODONE HYDROCHLORIDE 5 MG/1
5-10 TABLET ORAL
Status: DISCONTINUED | OUTPATIENT
Start: 2017-08-23 | End: 2017-08-26 | Stop reason: HOSPADM

## 2017-08-23 RX ORDER — PHENAZOPYRIDINE HYDROCHLORIDE 100 MG/1
200 TABLET, FILM COATED ORAL ONCE
Status: COMPLETED | OUTPATIENT
Start: 2017-08-23 | End: 2017-08-23

## 2017-08-23 RX ORDER — KETOROLAC TROMETHAMINE 30 MG/ML
30 INJECTION, SOLUTION INTRAMUSCULAR; INTRAVENOUS EVERY 6 HOURS
Status: COMPLETED | OUTPATIENT
Start: 2017-08-23 | End: 2017-08-24

## 2017-08-23 RX ORDER — ONDANSETRON 2 MG/ML
INJECTION INTRAMUSCULAR; INTRAVENOUS PRN
Status: DISCONTINUED | OUTPATIENT
Start: 2017-08-23 | End: 2017-08-23

## 2017-08-23 RX ORDER — HYDROMORPHONE HYDROCHLORIDE 1 MG/ML
.3-.5 INJECTION, SOLUTION INTRAMUSCULAR; INTRAVENOUS; SUBCUTANEOUS EVERY 10 MIN PRN
Status: DISCONTINUED | OUTPATIENT
Start: 2017-08-23 | End: 2017-08-23 | Stop reason: HOSPADM

## 2017-08-23 RX ORDER — KETOROLAC TROMETHAMINE 30 MG/ML
30 INJECTION, SOLUTION INTRAMUSCULAR; INTRAVENOUS EVERY 6 HOURS PRN
Status: DISCONTINUED | OUTPATIENT
Start: 2017-08-23 | End: 2017-08-23 | Stop reason: HOSPADM

## 2017-08-23 RX ORDER — FENTANYL CITRATE 50 UG/ML
25-50 INJECTION, SOLUTION INTRAMUSCULAR; INTRAVENOUS
Status: CANCELLED | OUTPATIENT
Start: 2017-08-23

## 2017-08-23 RX ORDER — BUPIVACAINE HYDROCHLORIDE 2.5 MG/ML
INJECTION, SOLUTION INFILTRATION; PERINEURAL PRN
Status: DISCONTINUED | OUTPATIENT
Start: 2017-08-23 | End: 2017-08-23 | Stop reason: HOSPADM

## 2017-08-23 RX ORDER — FENTANYL CITRATE 50 UG/ML
INJECTION, SOLUTION INTRAMUSCULAR; INTRAVENOUS PRN
Status: DISCONTINUED | OUTPATIENT
Start: 2017-08-23 | End: 2017-08-23

## 2017-08-23 RX ORDER — PROMETHAZINE HYDROCHLORIDE 25 MG/ML
12.5 INJECTION, SOLUTION INTRAMUSCULAR; INTRAVENOUS
Status: DISCONTINUED | OUTPATIENT
Start: 2017-08-23 | End: 2017-08-23 | Stop reason: HOSPADM

## 2017-08-23 RX ORDER — NALOXONE HYDROCHLORIDE 0.4 MG/ML
.1-.4 INJECTION, SOLUTION INTRAMUSCULAR; INTRAVENOUS; SUBCUTANEOUS
Status: DISCONTINUED | OUTPATIENT
Start: 2017-08-23 | End: 2017-08-23 | Stop reason: HOSPADM

## 2017-08-23 RX ORDER — LIDOCAINE 40 MG/G
CREAM TOPICAL
Status: DISCONTINUED | OUTPATIENT
Start: 2017-08-23 | End: 2017-08-26 | Stop reason: HOSPADM

## 2017-08-23 RX ORDER — FUROSEMIDE 10 MG/ML
INJECTION INTRAMUSCULAR; INTRAVENOUS PRN
Status: DISCONTINUED | OUTPATIENT
Start: 2017-08-23 | End: 2017-08-23

## 2017-08-23 RX ORDER — DOCUSATE SODIUM 100 MG/1
100 CAPSULE, LIQUID FILLED ORAL 2 TIMES DAILY
Status: DISCONTINUED | OUTPATIENT
Start: 2017-08-23 | End: 2017-08-26 | Stop reason: HOSPADM

## 2017-08-23 RX ORDER — ALBUTEROL SULFATE 0.83 MG/ML
2.5 SOLUTION RESPIRATORY (INHALATION) EVERY 4 HOURS PRN
Status: DISCONTINUED | OUTPATIENT
Start: 2017-08-23 | End: 2017-08-23 | Stop reason: HOSPADM

## 2017-08-23 RX ORDER — SODIUM CHLORIDE, SODIUM LACTATE, POTASSIUM CHLORIDE, CALCIUM CHLORIDE 600; 310; 30; 20 MG/100ML; MG/100ML; MG/100ML; MG/100ML
INJECTION, SOLUTION INTRAVENOUS CONTINUOUS
Status: DISCONTINUED | OUTPATIENT
Start: 2017-08-23 | End: 2017-08-25

## 2017-08-23 RX ORDER — SCOLOPAMINE TRANSDERMAL SYSTEM 1 MG/1
1 PATCH, EXTENDED RELEASE TRANSDERMAL ONCE
Status: COMPLETED | OUTPATIENT
Start: 2017-08-23 | End: 2017-08-23

## 2017-08-23 RX ORDER — CEFAZOLIN SODIUM 2 G/100ML
2 INJECTION, SOLUTION INTRAVENOUS
Status: COMPLETED | OUTPATIENT
Start: 2017-08-23 | End: 2017-08-23

## 2017-08-23 RX ORDER — LIDOCAINE HYDROCHLORIDE 20 MG/ML
INJECTION, SOLUTION INFILTRATION; PERINEURAL PRN
Status: DISCONTINUED | OUTPATIENT
Start: 2017-08-23 | End: 2017-08-23

## 2017-08-23 RX ADMIN — HYDROMORPHONE HYDROCHLORIDE 0.1 MG: 1 INJECTION, SOLUTION INTRAMUSCULAR; INTRAVENOUS; SUBCUTANEOUS at 13:41

## 2017-08-23 RX ADMIN — SODIUM CHLORIDE, POTASSIUM CHLORIDE, SODIUM LACTATE AND CALCIUM CHLORIDE: 600; 310; 30; 20 INJECTION, SOLUTION INTRAVENOUS at 12:52

## 2017-08-23 RX ADMIN — PROPOFOL 20 MG: 10 INJECTION, EMULSION INTRAVENOUS at 15:52

## 2017-08-23 RX ADMIN — FENTANYL CITRATE 25 MCG: 50 INJECTION, SOLUTION INTRAMUSCULAR; INTRAVENOUS at 12:25

## 2017-08-23 RX ADMIN — ONDANSETRON 4 MG: 2 INJECTION INTRAMUSCULAR; INTRAVENOUS at 12:42

## 2017-08-23 RX ADMIN — METRONIDAZOLE 500 MG: 500 INJECTION, SOLUTION INTRAVENOUS at 13:26

## 2017-08-23 RX ADMIN — Medication 1 G: at 15:32

## 2017-08-23 RX ADMIN — HYDROMORPHONE HYDROCHLORIDE 0.5 MG: 1 INJECTION, SOLUTION INTRAMUSCULAR; INTRAVENOUS; SUBCUTANEOUS at 16:54

## 2017-08-23 RX ADMIN — HYDROMORPHONE HYDROCHLORIDE 0.5 MG: 1 INJECTION, SOLUTION INTRAMUSCULAR; INTRAVENOUS; SUBCUTANEOUS at 16:41

## 2017-08-23 RX ADMIN — KETOROLAC TROMETHAMINE 30 MG: 30 INJECTION, SOLUTION INTRAMUSCULAR at 22:08

## 2017-08-23 RX ADMIN — FENTANYL CITRATE 50 MCG: 50 INJECTION, SOLUTION INTRAMUSCULAR; INTRAVENOUS at 12:56

## 2017-08-23 RX ADMIN — ROCURONIUM BROMIDE 10 MG: 10 INJECTION INTRAVENOUS at 13:27

## 2017-08-23 RX ADMIN — HYDROMORPHONE HYDROCHLORIDE 0.5 MG: 1 INJECTION, SOLUTION INTRAMUSCULAR; INTRAVENOUS; SUBCUTANEOUS at 22:44

## 2017-08-23 RX ADMIN — SODIUM CHLORIDE, POTASSIUM CHLORIDE, SODIUM LACTATE AND CALCIUM CHLORIDE: 600; 310; 30; 20 INJECTION, SOLUTION INTRAVENOUS at 15:45

## 2017-08-23 RX ADMIN — GLYCOPYRROLATE 0.6 MG: 0.2 INJECTION, SOLUTION INTRAMUSCULAR; INTRAVENOUS at 15:37

## 2017-08-23 RX ADMIN — HYDROMORPHONE HYDROCHLORIDE 0.5 MG: 1 INJECTION, SOLUTION INTRAMUSCULAR; INTRAVENOUS; SUBCUTANEOUS at 18:12

## 2017-08-23 RX ADMIN — SODIUM CHLORIDE, POTASSIUM CHLORIDE, SODIUM LACTATE AND CALCIUM CHLORIDE: 600; 310; 30; 20 INJECTION, SOLUTION INTRAVENOUS at 10:55

## 2017-08-23 RX ADMIN — KETOROLAC TROMETHAMINE 30 MG: 30 INJECTION, SOLUTION INTRAMUSCULAR; INTRAVENOUS at 16:54

## 2017-08-23 RX ADMIN — SODIUM CHLORIDE, POTASSIUM CHLORIDE, SODIUM LACTATE AND CALCIUM CHLORIDE: 600; 310; 30; 20 INJECTION, SOLUTION INTRAVENOUS at 18:01

## 2017-08-23 RX ADMIN — FENTANYL CITRATE 25 MCG: 50 INJECTION, SOLUTION INTRAMUSCULAR; INTRAVENOUS at 16:02

## 2017-08-23 RX ADMIN — FENTANYL CITRATE 25 MCG: 50 INJECTION, SOLUTION INTRAMUSCULAR; INTRAVENOUS at 12:35

## 2017-08-23 RX ADMIN — ROCURONIUM BROMIDE 5 MG: 10 INJECTION INTRAVENOUS at 12:35

## 2017-08-23 RX ADMIN — PHENAZOPYRIDINE HYDROCHLORIDE 200 MG: 100 TABLET, COATED ORAL at 10:45

## 2017-08-23 RX ADMIN — CEFAZOLIN 1000 ML GIVEN: 1 INJECTION, POWDER, FOR SOLUTION INTRAMUSCULAR; INTRAVENOUS at 15:15

## 2017-08-23 RX ADMIN — CEFAZOLIN SODIUM 2 G: 2 INJECTION, SOLUTION INTRAVENOUS at 12:25

## 2017-08-23 RX ADMIN — ALBUTEROL SULFATE 6 PUFF: 90 AEROSOL, METERED RESPIRATORY (INHALATION) at 13:18

## 2017-08-23 RX ADMIN — MIDAZOLAM HYDROCHLORIDE 2 MG: 1 INJECTION, SOLUTION INTRAMUSCULAR; INTRAVENOUS at 12:25

## 2017-08-23 RX ADMIN — PROPOFOL 20 MG: 10 INJECTION, EMULSION INTRAVENOUS at 15:42

## 2017-08-23 RX ADMIN — SCOPALAMINE 1 PATCH: 1 PATCH, EXTENDED RELEASE TRANSDERMAL at 10:45

## 2017-08-23 RX ADMIN — ACETAMINOPHEN 975 MG: 325 TABLET, FILM COATED ORAL at 19:57

## 2017-08-23 RX ADMIN — ALBUTEROL SULFATE 6 PUFF: 90 AEROSOL, METERED RESPIRATORY (INHALATION) at 14:42

## 2017-08-23 RX ADMIN — HYDROMORPHONE HYDROCHLORIDE 0.1 MG: 1 INJECTION, SOLUTION INTRAMUSCULAR; INTRAVENOUS; SUBCUTANEOUS at 13:53

## 2017-08-23 RX ADMIN — ROCURONIUM BROMIDE 10 MG: 10 INJECTION INTRAVENOUS at 13:57

## 2017-08-23 RX ADMIN — HYDROMORPHONE HYDROCHLORIDE 0.2 MG: 1 INJECTION, SOLUTION INTRAMUSCULAR; INTRAVENOUS; SUBCUTANEOUS at 13:47

## 2017-08-23 RX ADMIN — BUPIVACAINE HYDROCHLORIDE 30 ML: 2.5 INJECTION, SOLUTION INFILTRATION; PERINEURAL at 15:35

## 2017-08-23 RX ADMIN — ROCURONIUM BROMIDE 25 MG: 10 INJECTION INTRAVENOUS at 12:42

## 2017-08-23 RX ADMIN — LIDOCAINE HYDROCHLORIDE 40 MG: 20 INJECTION, SOLUTION INFILTRATION; PERINEURAL at 12:35

## 2017-08-23 RX ADMIN — SODIUM CHLORIDE, POTASSIUM CHLORIDE, SODIUM LACTATE AND CALCIUM CHLORIDE: 600; 310; 30; 20 INJECTION, SOLUTION INTRAVENOUS at 20:04

## 2017-08-23 RX ADMIN — HYDROMORPHONE HYDROCHLORIDE 0.5 MG: 1 INJECTION, SOLUTION INTRAMUSCULAR; INTRAVENOUS; SUBCUTANEOUS at 17:17

## 2017-08-23 RX ADMIN — DOCUSATE SODIUM 100 MG: 100 CAPSULE, LIQUID FILLED ORAL at 19:56

## 2017-08-23 RX ADMIN — HYDROMORPHONE HYDROCHLORIDE 0.1 MG: 1 INJECTION, SOLUTION INTRAMUSCULAR; INTRAVENOUS; SUBCUTANEOUS at 13:33

## 2017-08-23 RX ADMIN — FUROSEMIDE 10 MG: 10 INJECTION, SOLUTION INTRAVENOUS at 16:02

## 2017-08-23 RX ADMIN — ROCURONIUM BROMIDE 10 MG: 10 INJECTION INTRAVENOUS at 13:11

## 2017-08-23 RX ADMIN — FENTANYL CITRATE 25 MCG: 50 INJECTION, SOLUTION INTRAMUSCULAR; INTRAVENOUS at 16:16

## 2017-08-23 RX ADMIN — Medication 120 MG: at 12:35

## 2017-08-23 RX ADMIN — HYDROMORPHONE HYDROCHLORIDE 0.1 MG: 1 INJECTION, SOLUTION INTRAMUSCULAR; INTRAVENOUS; SUBCUTANEOUS at 13:44

## 2017-08-23 RX ADMIN — PROPOFOL 20 MG: 10 INJECTION, EMULSION INTRAVENOUS at 15:58

## 2017-08-23 RX ADMIN — DEXAMETHASONE SODIUM PHOSPHATE 5 MG: 10 INJECTION, SOLUTION INTRAMUSCULAR; INTRAVENOUS at 12:42

## 2017-08-23 RX ADMIN — NEOSTIGMINE METHYLSULFATE 4 MG: 1 INJECTION INTRAMUSCULAR; INTRAVENOUS; SUBCUTANEOUS at 15:37

## 2017-08-23 RX ADMIN — HYDROMORPHONE HYDROCHLORIDE 0.1 MG: 1 INJECTION, SOLUTION INTRAMUSCULAR; INTRAVENOUS; SUBCUTANEOUS at 14:21

## 2017-08-23 RX ADMIN — FENTANYL CITRATE 50 MCG: 50 INJECTION, SOLUTION INTRAMUSCULAR; INTRAVENOUS at 16:10

## 2017-08-23 RX ADMIN — PROPOFOL 20 MG: 10 INJECTION, EMULSION INTRAVENOUS at 15:44

## 2017-08-23 ASSESSMENT — PAIN DESCRIPTION - DESCRIPTORS
DESCRIPTORS: DISCOMFORT;BURNING
DESCRIPTORS: DISCOMFORT
DESCRIPTORS: DISCOMFORT

## 2017-08-23 NOTE — IP AVS SNAPSHOT
MRN:[id number]                      After Visit Summary   8/23/2017    Shewta Harris    MRN: [id number]           Thank you!     Thank you for choosing [facility] for your care. Our goal is always to provide you with excellent care. Hearing back from our patients is one way we can continue to improve our services. Please take a few minutes to complete the written survey that you may receive in the mail after you visit with us. Thank you!        Patient Information     Date Of Birth          1965        Designated Caregiver       Most Recent Value    Caregiver    Will someone help with your care after discharge? yes    Name of designated caregiver boyfriend    Phone number of caregiver see facesheet    Caregiver address see facesheet      About your hospital stay     You were admitted on:  August 23, 2017 You last received care in the:  [facility]    You were discharged on:  August 26, 2017        Reason for your hospital stay       Pelvic pain, severe  dysmenorrhea                  Who to Call     For medical emergencies, please call 911.  For non-urgent questions about your medical care, please call your primary care provider or clinic, [phone]  For questions related to your surgery, please call your surgery clinic        Attending Provider     Provider Specialty    Santhosh May MD OB/Gyn    Antionette Edward PA [facility]       Primary Care Provider Office Phone # Fax #    DESTIN Zuleta [phone]      After Care Instructions     Activity       Your activity upon discharge: activity as tolerated and activity as tolerated and no driving for today            Diet       Follow this diet upon discharge: Orders Placed This Encounter      Advance Diet as Tolerated: Regular Diet Adult                  Follow-up Appointments     Follow-up and recommended labs and tests        Follow up with Dr. Marmolejo , at GYN clinic in 4-5 days for clip removal,       "            Pending Results     No orders found from 8/21/2017 to 8/24/2017.            Statement of Approval     Ordered          08/26/17 1333  I have reviewed and agree with all the recommendations and orders detailed in this document.  EFFECTIVE NOW     Approved and electronically signed by:  Polo Marmolejo MD             Admission Information     Date & Time Provider Department Dept. Phone    8/23/2017 Antionette Edward PA [facility] [phone]      Your Vitals Were     Blood Pressure Temperature Respirations Height Weight Last Period    132/85 (BP Location: Right arm) 98.8  F (37.1  C) (Tympanic) 16 1.727 m (5' 8\") 91.5 kg (201 lb 11.5 oz) 07/12/2017    Pulse Oximetry BMI (Body Mass Index)                96% 30.67 kg/m2          MyChart Information     [facility] gives you secure access to your electronic health record. If you see a primary care provider, you can also send messages to your care team and make appointments. If you have questions, please call your primary care clinic.  If you do not have a primary care provider, please call [phone] and they will assist you.        Care EveryWhere ID     This is your Care EveryWhere ID. This could be used by other organizations to access your [place] medical records  [id number]        Equal Access to Services     JEANNE SOLIZ : Hadmaki rando Sodionicioali, waaxda luqadaha, qaybta kaalmada adeegyada, ángela gill. So [facility] [phone].    ATENCIÓN: Si habla español, tiene a boone disposición servicios gratuitos de asistencia lingüística. Llame al [phone].    We comply with applicable federal civil rights laws and [place] laws. We do not discriminate on the basis of race, color, national origin, age, disability sex, sexual orientation or gender identity.               Review of your medicines      START taking        Dose / Directions    docusate sodium 100 MG tablet   Commonly known as:  COLACE        Dose:  100 " mg   Take 100 mg by mouth daily   Quantity:  24 tablet   Refills:  0       enoxaparin 40 MG/0.4ML injection   Commonly known as:  LOVENOX   Used for:  Heterozygous factor V Leiden mutation (H)        Dose:  40 mg   Inject 0.4 mLs (40 mg) Subcutaneous every 24 hours   Quantity:  12 mL   Refills:  0         STOP taking     zolpidem 6.25 MG CR tablet   Commonly known as:  AMBIEN CR                Where to get your medicines      These medications were sent to [facility] - [facility] - [address]  [address] [address]     Phone:  [phone]     docusate sodium 100 MG tablet    enoxaparin 40 MG/0.4ML injection                Protect others around you: Learn how to safely use, store and throw away your medicines at www.disposemymeds.org.             Medication List: This is a list of all your medications and when to take them. Check marks below indicate your daily home schedule. Keep this list as a reference.      Medications           Morning Afternoon Evening Bedtime As Needed    docusate sodium 100 MG tablet   Commonly known as:  COLACE   Take 100 mg by mouth daily                                enoxaparin 40 MG/0.4ML injection   Commonly known as:  LOVENOX   Inject 0.4 mLs (40 mg) Subcutaneous every 24 hours   Last time this was given:  40 mg on 8/25/2017  4:39 PM                                          More Information        Endometriosis    Endometriosis is a condition that occurs when the tissue that lines the uterus begins to grow where it should not. The tissue may grow on the outside surface of the uterus, the ovaries, or fallopian tubes. It may also grow on the bowel, rectum, bladder, or other parts. The tissue responds to the same hormones that control your menstrual cycle. So it may swell and bleed just like the lining of the uterus, which is shed every month during your period. The swelling and blood irritate nearby tissues. This causes pelvic or lower abdominal (belly)  pain and cramping. The pain is often worse around the time of your period. Pain during sex is also common. Some women have pain during bowel movements or urination. Over time, scar tissue may form. This scar tissue can bind organs together. It can also cause problems getting pregnant (infertility).  The exact cause of endometriosis is unknown. The best way to confirm a diagnosis is with a procedure called laparoscopy. This allows the provider to look inside the abdomen with a small tube and light.  Treatment depends on your symptoms. If pain is mild, no treatment may be needed. If pain is more severe, medicines may be advised. Surgery may also be an option. Your provider can tell you more as needed.  Home care  Medicines  To help manage endometriosis, any of the following medicines may be used:    Pain relievers, such as non-steroidal anti-inflammatory drugs (NSAIDS)    Hormonal medicines    Birth control pills    Progestin-only pills    Gonadotropin-releasing hormone (GnRH) agonists    Danazol  If you re prescribed medicines, be sure to take them as directed. Also, report any side effects you have to your provider.  General care    Rest as needed when you have symptoms.    To help ease pain or cramping, apply a heating pad to where the pain is. You may also use a hot water bottle.  Follow-up care  Follow up with your healthcare provider, or as directed.  When to seek medical advice  Call your healthcare provider right away if any of these occur:    Fever of 100.4 F (38 C) or higher, or as directed by your provider    Abdominal or pelvic pain or cramping worsens, or doesn t improve with medicines    Pain during sex, urination, or bowel movements continues    Heavy bleeding (soaking 1 pad or tampon every hour for 3 hours)    Missed periods (if they are usually regular)    Sudden, severe pain in the abdomen    Abdominal swelling with nausea or vomiting    Blood in the urine or problems urinating    Blood in the  stool    Dizziness, weakness, fainting  Note: If you have been unable to get pregnant after trying for 1 year, see your provider. He or she can talk with you about infertility testing.   Date Last Reviewed: 2015-2017 [employer]. [address] [place] [address]. All rights reserved. This information is not intended as a substitute for professional medical care. Always follow your healthcare professional's instructions.                Obstetric Endometritis    You have endometritis. This means the lining of the uterus is inflamed. It is usually caused by an infection.   These are common symptoms of endometritis:    Fever    Feeling ill    Pain in the lower belly (abdomen)    Abnormal bleeding from the vagina    Bad-smelling fluid from the vagina    Pain when urinating    Pain during sex    Headache    Fatigue    Loss of appetite  Endometritis can be caused by:    Vaginal delivery or     Long labor    Miscarriage        Tissue from the placenta that stays in the uterus after delivery    Vaginal infection  In some cases, an infection called pelvic inflammatory disease (PID) also happens. This affects the ovaries and fallopian tubes. If not treated, PID may lead to infertility. It can also lead to full-body infection (sepsis).  Home care  You may be given antibiotic medicine to take for the infection. Your symptoms should get better within 2 to 3 days of starting the antibiotics. Take the antibiotics until they are used up. It s important to finish the antibiotics even if you feel better. This is to make sure the infection has cleared up.  General care  You can take acetaminophen or ibuprofen for pain, unless you were given a different pain medicine to use. If you have chronic liver or kidney disease, talk with your healthcare provider before using these medicines. Also talk with your provider if you ve had a stomach ulcer or GI bleeding or are taking blood thinner  medicine.    Plan to rest at home for the rest of the day.    Until you have taken all the antibiotics and your symptoms are gone:    Don t have sex until your provider says it is OK.    Don t put anything into your vagina, including tampons.    Don t douche. Douching is generally not recommended at any time.    Don t take a tub bath, use a hot tub, or swim. It s fine to shower.  Follow-up care  Follow up with your healthcare provider, or as advised. You may need to have a procedure to clear tissue out of the uterus. This is called dilation and curettage (D&C). Your provider can tell you more about this.  Call 911  Call 911 if any of these occur:    Severe pain and very heavy bleeding    Severe lightheadedness, passing out, or fainting    Rapid heart rate    Trouble breathing    Confusion or difficulty waking up  When to seek medical advice  Call your healthcare provider right away if any of these occur:    Fever of 100.4 F (38 C) or higher, or as directed by your healthcare provider    Symptoms get worse    You have new symptoms    Nausea or vomiting    Your symptoms don t improve within 3 days of starting treatment  Date Last Reviewed: 10/1/2016    2638-0188 [employer]. [address] [place] [address]. All rights reserved. This information is not intended as a substitute for professional medical care. Always follow your healthcare professional's instructions.                Problems That Hysterectomy Can Treat  Problems with any of the reproductive organs can disrupt your cycle, cause symptoms, or threaten your health. Some of the most common problems are shown below. A hysterectomy may also be done for other reasons.          Other problems hysterectomy can treat  Cervical dysplasia, chronic pelvic pain, abnormal uterine bleeding due to hyperplasia or adenomyosis.  Planning your treatment  After going over the results of your exam and any tests, you and your doctor will make a treatment  plan. Options may include hysterectomy by itself or along with other treatments. As you create the plan, your doctor may discuss the following questions with you:    How severe is your problem?    Do you want to have children?    Should the tubes or ovaries be removed too?    Should the hysterectomy be done abdominally, laparoscopically, or vaginally?   Date Last Reviewed: 5/13/2015 2000-2017 [employer]. [address] [place] [address], [place] [address]. All rights reserved. This information is not intended as a substitute for professional medical care. Always follow your healthcare professional's instructions.                Preventing Surgical Site Infections     Hand washing reduces the risk of infection.     One risk of having surgery is an infection at the surgical site. The surgical site is any cut the surgeon makes in the skin to do the operation. Surgical site infections can range from minor to severe or even fatal. This sheet tells you more about surgical site infections, what hospitals are doing to prevent them, and how they are treated if they do occur. It also tells you what you can do to prevent these infections.  What causes surgical site infections?  Germs are everywhere. They re on your skin, in the air, and on things you touch. Many germs are good. Some are harmful. Surgical site infections occur when harmful germs enter your body through the incision in your skin. Some infections are caused by germs that are in the air or on objects. But most are caused by germs found on and in your own body.  Who is at risk for surgical site infections?  Anyone can have a surgical site infection. Your risk is greater if you:    Are an older adult    Have a weak immune system or other health conditions or illnesses such as diabetes    Take certain medicines such as steroids    Are a smoker    Have certain types of operations, such as abdominal surgery    Have poor nutrition    Are very overweight    If  the operation lasts longer than 2 hours  What are the symptoms of a surgical site infection?    The infection usually starts with increased skin redness, pain, and swelling around the incision. Later you may notice a cloudy or greenish-yellow discharge from the incision and it may develop a foul odor. The incision may separate or open up. You are also likely to have a fever and may feel very ill.    Symptoms can appear any time from hours to weeks after surgery. Implants such as an artificial knee or hip can become infected at any time after the operation.  How are surgical site infections treated?    Surgical site infections are treated with antibiotics. The type of medicine you get will depend on the germ thought to be causing the infection. Most serious wound infections need local wound care, and in some cases, further surgery.    An infected skin wound may be reopened and cleaned. Sometimes, deep wounds need to be packed with gauze that is changed often until the wound starts to heal from the inside out. Your healthcare provider will figure out the best care needed to treat your surgical site infection.    If an infection occurs where an implant is placed, the implant may be removed.    If you have an infection deeper in your body, you may need another operation to treat it.  What hospitals do to prevent surgical site infections  Many hospitals take these steps to help prevent surgical site infections:    Handwashing. Before the operation, your surgeon and all operating room staff scrub their hands and arms with an antiseptic soap.    Clean skin. The site where your incision is made is carefully cleaned with an antiseptic solution.    Sterile clothing and drapes. Members of your surgical team wear medical uniforms (scrub suits), long-sleeved surgical gowns, masks, caps, shoe covers, and sterile gloves. Your body is fully covered with a large sterile sheet (sterile drape) except for the spot where the incision is  made.    Clean air. Operating rooms have special air filters and positive pressure airflow to prevent unfiltered air from entering the room.    Careful use of antibiotics. Antibiotics are given no more than 60 minutes before the incision is made and stopped within 24 hours after surgery. This helps kill germs but avoids problems that can occur when antibiotics are taken longer.    Controlled blood sugar levels. Your blood sugar level may rise because of the stress of the surgery. Your blood sugar level is watched closely to make sure it stays within a normal range. High blood sugar delays wound healing and increases the chances for infection.    Controlled body temperature. A lower-than-normal temperature during or after surgery prevents oxygen from reaching the wound and makes it harder for your body to fight infection. Hospitals may warm IV fluids, increase the temperature in the operating room, and provide warm-air blankets.    Proper hair removal. Any hair that must be removed is clipped right before the incision, not shaved with a razor. This prevents tiny nicks and cuts through which germs can enter.    Wound care. After surgery, a closed wound is covered with a sterile dressing for a day or two. Open wounds are packed with sterile gauze and covered with a sterile dressing.  What you can do to prevent surgical site infections    Ask questions. Learn what your hospital is doing to prevent infection.    If your doctor tells you to, shower or bathe with plain soap the night before and the day of your operation. Follow the instructions you are given. You may be asked to use a special cleanser that you don t rinse off.    If you smoke, stop for the longest duration possible before and after the operation. Ask your doctor about ways to quit.    Take antibiotics only when your healthcare provider tells you to. Using antibiotics when they re not needed can create germs that are harder to kill. Also, finish all your  antibiotics, even if you feel better.    Be sure healthcare workers clean their hands with plain soap and water or with an alcohol-based hand  before and after caring for you. Don t be afraid to remind them.    After surgery, eat healthy foods. Care for your incision as directed by your doctor or nurse.     When to seek medical care  Call your healthcare provider if you have any of the following:    Increased soreness, pain, or tenderness at the surgical site    A red streak, increased redness, or puffiness near the incision    Yellowish, cloudy, or bad-smelling discharge from the incision    Stitches that dissolve before the wound heals    Fever of 100.4 F (38 C) or higher, or as directed by your healthcare provider    A tired feeling that doesn t go away   Date Last Reviewed: 12/1/2016 2000-2017 [employer]. [address] [place] [address]. All rights reserved. This information is not intended as a substitute for professional medical care. Always follow your healthcare professional's instructions.

## 2017-08-23 NOTE — IP AVS SNAPSHOT
[facility]    [address] [place] [id number]    Phone:  [phone]    Fax:  [phone]                                       After Visit Summary   8/23/2017    Shweta Harris    MRN: [id number]           After Visit Summary Signature Page     I have received my discharge instructions, and my questions have been answered. I have discussed any challenges I see with this plan with the nurse or doctor.    ..........................................................................................................................................  Patient/Patient Representative Signature      ..........................................................................................................................................  Patient Representative Print Name and Relationship to Patient    ..................................................               ................................................  Date                                            Time    ..........................................................................................................................................  Reviewed by Signature/Title    ...................................................              ..............................................  Date                                                            Time

## 2017-08-23 NOTE — ANESTHESIA PREPROCEDURE EVALUATION
Anesthesia Evaluation     . Pt has had prior anesthetic.     No history of anesthetic complications          ROS/MED HX    ENT/Pulmonary:  - neg pulmonary ROS     Neurologic:  - neg neurologic ROS     Cardiovascular:     (+) ----. : . . . :. Irregular Heartbeat/Palpitations, .       METS/Exercise Tolerance:     Hematologic:     (+) Other Hematologic Disorder-Heterozygous factor V Leiden mutation       Musculoskeletal:  - neg musculoskeletal ROS       GI/Hepatic:  - neg GI/hepatic ROS       Renal/Genitourinary:     (+) Other Renal/ Genitourinary, SEVERE DYSMENORRHEA, PELVIC PAIN       Endo:     (+) Obesity, .      Psychiatric:  - neg psychiatric ROS       Infectious Disease:  - neg infectious disease ROS       Malignancy:      - no malignancy   Other:    (+) No chance of pregnancy   - neg other ROS                 Physical Exam  Normal systems: cardiovascular, pulmonary and dental    Airway   Mallampati: II  TM distance: >3 FB  Neck ROM: full    Dental     Cardiovascular   Rhythm and rate: regular and normal      Pulmonary    breath sounds clear to auscultation                    Anesthesia Plan      History & Physical Review  History and physical reviewed and following examination; no interval change.    ASA Status:  2 .    NPO Status:  > 8 hours    Plan for General, ETT and Periph. Nerve Block for postop pain with Intravenous and Propofol induction. Maintenance will be Balanced.    PONV prophylaxis:  Ondansetron (or other 5HT-3), Scopolamine patch and Dexamethasone or Solumedrol  HCG Negative      Postoperative Care  Postoperative pain management:  IV analgesics, Oral pain medications and Peripheral nerve block (Single Shot).      Consents  Anesthetic plan, risks, benefits and alternatives discussed with:  Patient..                          .

## 2017-08-23 NOTE — ANESTHESIA CARE TRANSFER NOTE
Patient: Shweta Harris    Procedure(s):  LAPAROSCOPIC ASSISTED VAGINAL HYSTERECTOMY, BILATERAL SALPINGO-OOPHORECTOMY ATTEMPTED, total abdominal hysterctomy with bilateral salpingo-oopherectomy, cystoscopy - Wound Class: II-Clean Contaminated   - Wound Class: II-Clean Contaminated   - Wound Class: II-Clean Contaminated    Diagnosis: SEVERE DYSMENORRHEA, PELVIC PAIN IN FEMALE  Diagnosis Additional Information: No value filed.    Anesthesia Type:   General, ETT, Periph. Nerve Block for postop pain     Note:  Airway :Nasal Cannula  Patient transferred to:PACU        Vitals: (Last set prior to Anesthesia Care Transfer)    CRNA VITALS  8/23/2017 1540 - 8/23/2017 1617      8/23/2017             Resp Rate (set): 8                Electronically Signed By: BETO Sawant CRNA  August 23, 2017  4:17 PM

## 2017-08-23 NOTE — OR NURSING
"Catheter discomfort most annoying.Pain down 6/10.Eyes closed.States \"I never had a catheter before.\"  "

## 2017-08-24 LAB
ERYTHROCYTE [DISTWIDTH] IN BLOOD BY AUTOMATED COUNT: 12.6 % (ref 10–15)
HCT VFR BLD AUTO: 32.1 % (ref 35–47)
HGB BLD-MCNC: 11.1 G/DL (ref 11.7–15.7)
MCH RBC QN AUTO: 30.2 PG (ref 26.5–33)
MCHC RBC AUTO-ENTMCNC: 34.6 G/DL (ref 31.5–36.5)
MCV RBC AUTO: 87 FL (ref 78–100)
PLATELET # BLD AUTO: 208 10E9/L (ref 150–450)
RBC # BLD AUTO: 3.68 10E12/L (ref 3.8–5.2)
WBC # BLD AUTO: 10.2 10E9/L (ref 4–11)

## 2017-08-24 PROCEDURE — 25000132 ZZH RX MED GY IP 250 OP 250 PS 637: Performed by: OBSTETRICS & GYNECOLOGY

## 2017-08-24 PROCEDURE — S0077 INJECTION, CLINDAMYCIN PHOSP: HCPCS | Performed by: OBSTETRICS & GYNECOLOGY

## 2017-08-24 PROCEDURE — 85027 COMPLETE CBC AUTOMATED: CPT | Performed by: OBSTETRICS & GYNECOLOGY

## 2017-08-24 PROCEDURE — 12000000 ZZH R&B MED SURG/OB

## 2017-08-24 PROCEDURE — 25000125 ZZHC RX 250: Performed by: OBSTETRICS & GYNECOLOGY

## 2017-08-24 PROCEDURE — 25000128 H RX IP 250 OP 636: Performed by: OBSTETRICS & GYNECOLOGY

## 2017-08-24 PROCEDURE — 36415 COLL VENOUS BLD VENIPUNCTURE: CPT | Performed by: OBSTETRICS & GYNECOLOGY

## 2017-08-24 RX ORDER — CLINDAMYCIN PHOSPHATE 900 MG/50ML
900 INJECTION, SOLUTION INTRAVENOUS EVERY 8 HOURS
Status: DISCONTINUED | OUTPATIENT
Start: 2017-08-24 | End: 2017-08-26 | Stop reason: HOSPADM

## 2017-08-24 RX ADMIN — OXYCODONE HYDROCHLORIDE 5 MG: 5 TABLET ORAL at 16:49

## 2017-08-24 RX ADMIN — OXYCODONE HYDROCHLORIDE 5 MG: 5 TABLET ORAL at 17:47

## 2017-08-24 RX ADMIN — KETOROLAC TROMETHAMINE 30 MG: 30 INJECTION, SOLUTION INTRAMUSCULAR at 11:03

## 2017-08-24 RX ADMIN — ACETAMINOPHEN 975 MG: 325 TABLET, FILM COATED ORAL at 01:39

## 2017-08-24 RX ADMIN — HYDROMORPHONE HYDROCHLORIDE 0.3 MG: 1 INJECTION, SOLUTION INTRAMUSCULAR; INTRAVENOUS; SUBCUTANEOUS at 13:03

## 2017-08-24 RX ADMIN — KETOROLAC TROMETHAMINE 30 MG: 30 INJECTION, SOLUTION INTRAMUSCULAR at 04:15

## 2017-08-24 RX ADMIN — CLINDAMYCIN PHOSPHATE 900 MG: 18 INJECTION, SOLUTION INTRAVENOUS at 16:49

## 2017-08-24 RX ADMIN — ACETAMINOPHEN 975 MG: 325 TABLET, FILM COATED ORAL at 11:03

## 2017-08-24 RX ADMIN — SODIUM CHLORIDE, POTASSIUM CHLORIDE, SODIUM LACTATE AND CALCIUM CHLORIDE: 600; 310; 30; 20 INJECTION, SOLUTION INTRAVENOUS at 03:24

## 2017-08-24 RX ADMIN — ACETAMINOPHEN 975 MG: 325 TABLET, FILM COATED ORAL at 17:46

## 2017-08-24 RX ADMIN — OXYCODONE HYDROCHLORIDE 10 MG: 5 TABLET ORAL at 21:57

## 2017-08-24 RX ADMIN — DOCUSATE SODIUM 100 MG: 100 CAPSULE, LIQUID FILLED ORAL at 20:51

## 2017-08-24 RX ADMIN — OXYCODONE HYDROCHLORIDE 5 MG: 5 TABLET ORAL at 09:27

## 2017-08-24 RX ADMIN — DOCUSATE SODIUM 100 MG: 100 CAPSULE, LIQUID FILLED ORAL at 09:27

## 2017-08-24 RX ADMIN — HYDROMORPHONE HYDROCHLORIDE 0.5 MG: 1 INJECTION, SOLUTION INTRAMUSCULAR; INTRAVENOUS; SUBCUTANEOUS at 01:39

## 2017-08-24 RX ADMIN — SODIUM CHLORIDE, POTASSIUM CHLORIDE, SODIUM LACTATE AND CALCIUM CHLORIDE: 600; 310; 30; 20 INJECTION, SOLUTION INTRAVENOUS at 11:10

## 2017-08-24 RX ADMIN — ENOXAPARIN SODIUM 40 MG: 40 INJECTION SUBCUTANEOUS at 16:49

## 2017-08-24 ASSESSMENT — PAIN DESCRIPTION - DESCRIPTORS
DESCRIPTORS: DISCOMFORT
DESCRIPTORS: DISCOMFORT;BURNING
DESCRIPTORS: DISCOMFORT
DESCRIPTORS: DISCOMFORT

## 2017-08-24 NOTE — PROGRESS NOTES
"Shweta Harris is an 51 year old female who was having pelvic pain and severe dysmenorrhea for almost 2 years.  She had a laparoscopy then laparotomy and a hysterectomy and bilateral salpingo-oophorectomy done yesterday 8.23.2017 because of findings of swollen fallopian tubes with endometriosis and uterine fibroids.  Cystoscopy showed no injury to the bladder and normal functioning of the ureters with urine seen flowing from the ureteric orifices on both sides.    She has not passed flatus today yet.  She has factor V Leiden and shall be receiving Lovenox today and for the next 30 days    Past Medical History:   Diagnosis Date     Dermatitis fungal 6/30/2011     Factor V Leiden 6/28/2012     Family history of other blood disorders 6/30/2011     Heterozygous factor V Leiden mutation (H) 10/9/2014     Heterozygous factor V Leiden mutation (H)      Palpitations 9/22/2006       Allergies:   Allergies   Allergen Reactions     Seasonal Allergies        Active Problems:    Endometriosis of pelvis    Blood pressure 114/75, temperature 99.4  F (37.4  C), temperature source Tympanic, resp. rate 16, height 1.727 m (5' 8\"), weight 87.1 kg (192 lb), last menstrual period 07/12/2017, SpO2 93 %, not currently breastfeeding.    ROS    Physical Exam   Temp  99.4  Lungs clear  Abdomen not distended  Bowel sound heard and active  Wound dry and not infected  No calve tenderness        Assessment:  Stable post op.  She is in good spirits    Plan:  Start Lovenox  IV antibiotics  Continue IV fluids  NO drain some blood stained fluid and will leave it to drain  Till at least tomorrow.    Santhosh May  8/24/2017  "

## 2017-08-24 NOTE — PLAN OF CARE
Problem: Goal Outcome Summary  Goal: Goal Outcome Summary  Outcome: No Change  Alert and oriented x4. VSS. Oxygen removed, O2 sats 93% on room air. Roland catheter remains in place although pt still complains of discomfort.  Hydromorphone and scheduled Tylenol and Toradol controlling pain. Urine output averaging 30mL/hr. Orange tint to urine has diminished. Incision is WDL.  Abdominal binder in place. Pt sat edge of bed with assistance of one person last evening before bed. NO drain has bright red/bloody drainage. Tolerating clear liquid diet.

## 2017-08-24 NOTE — PLAN OF CARE
"Problem: Goal Outcome Summary  Goal: Goal Outcome Summary  Outcome: Improving  Alert and oriented. Slight elevation in temperature today. /75 (BP Location: Left arm)  Temp 99.4  F (37.4  C) (Tympanic)  Resp 16  Ht 1.727 m (5' 8\")  Wt 87.1 kg (192 lb)  LMP 07/12/2017  SpO2 93%  BMI 29.19 kg/m2 Pain rated 5/10 with no relief from toradol Iv or oral tylenol. Slight stomach upset from 5mg oral oxycodone. Pain relief 0.3mg IV dilaudid. Dressing to be changed 24-48 hours post op, previous markings assessed and extended by this staff. NO drain dressing clean, dry, intact and 60mL bright red drainage this shift. Roland catheter in place until post op day 2, positional. Output 1,025 mL this shift. Lovenox to begin this evening with platelet re draw 8/26. Pt ambulated in room x1 today assist of 1 with walker, tolerated well. Diet advanced to regular.          "

## 2017-08-24 NOTE — PLAN OF CARE
[facility] Inpatient Admission Note:    Patient admitted to 3218/3218-1 at approximately 1755 via cart accompanied by other:nurse from surgery . Report received from Alanna SINGH RN in SBAR format at Alanna SINGH RN via face to face in room. Patient transferred to bed via slide board.. Patient is alert and oriented X 3, reports pain; rates at 4 on 0-10 scale.  Patient oriented to room, unit, hourly rounding, and plan of care. Explained admission packet and patient handbook with patient bill of rights brochure. Will continue to monitor and document as needed.     Inpatient Nursing criteria listed below was met:      Health care directives status obtained and documented: No      Care Everywhere authorization obtained No      MRSA swab completed for patient 65 years and older: N/A      Patient identifies a surrogate decision maker: Yes If yes, who: Daughter Collene Contact Information:as in face sheet      Core Measure diagnosis present:No. If yes, state diagnosis: n/a       If initial lactic acid >2.0, repeat lactic acid drawn within one hour of arrival to unit: No. If no, state reason: n/a      Vaccination assessment and education completed: Yes   Vaccinations received prior to admission: Pneumovax no  Influenza(seasonal)  N/A   Vaccination(s) ordered:  None indicated      Clergy visit ordered if patient requests: N/A      Skin issues/needs documented: N/A      Isolation Patient: no Education given, correct sign in place and documentation row added to PCS:  No      Fall Prevention Yes: Care plan updated, education given and documented, sticker and magnet in place: Yes,      Care Plan initiated: Yes, requested that oncoming RN complete care plan      Education Documented (including assessment): Yes      Patient has discharge needs : No If yes, please explain:n/a

## 2017-08-24 NOTE — ANESTHESIA POSTPROCEDURE EVALUATION
Patient: Shweta Harris    Procedure(s):  LAPAROSCOPIC ASSISTED VAGINAL HYSTERECTOMY, BILATERAL SALPINGO-OOPHORECTOMY ATTEMPTED, total abdominal hysterctomy with bilateral salpingo-oopherectomy, cystoscopy - Wound Class: II-Clean Contaminated   - Wound Class: II-Clean Contaminated   - Wound Class: II-Clean Contaminated    Diagnosis:SEVERE DYSMENORRHEA, PELVIC PAIN IN FEMALE  Diagnosis Additional Information: No value filed.    Anesthesia Type:  General, ETT, Periph. Nerve Block for postop pain    Note:  Anesthesia Post Evaluation    Patient location during evaluation: PACU, Bedside and Floor  Patient participation: Able to fully participate in evaluation  Level of consciousness: awake and alert  Pain management: adequate  Airway patency: patent  Cardiovascular status: acceptable  Respiratory status: acceptable  Hydration status: stable  PONV: none     Anesthetic complications: None          Last vitals:  Vitals:    08/23/17 1900 08/23/17 2000 08/24/17 0415   BP: 127/78 124/78 114/75   Resp:  16 16   Temp:  98.9  F (37.2  C) 99.4  F (37.4  C)   SpO2: 97% 97%          Electronically Signed By: Brian Rivera MD  August 24, 2017  7:06 AM

## 2017-08-24 NOTE — OP NOTE
[place]  [facility] Gynecology  Operative Note     Pre-operative diagnosis: SEVERE DYSMENORRHEA, PELVIC PAIN IN FEMALE   Post-operative diagnosis: Pelvic endometriosis possible salpingitis; uterine fibroid; possible adenomyosis   Procedure: Laparoscopy;  Laparotomy and total abdominal hysterectomy and bilateral salpingo-ophrectomy; Cystoscopy   Surgeon: Santhosh May MD   Assistant(s): None   Anesthesia: General Endotracheal Anesthesia   Anesthetist  Caitlin Melchor   Estimated blood loss: 200ml   Total IV fluids: See anesthesia record   Blood transfusion: No transfusion was given during surgery   Total urine output: (See anesthesia record)   Drains: Roland catheter   Specimens: Uterus with fallopian tubes and ovaries   Findings: Fallopian tubes swollen; Endometriosis of the ovaries and pelvis; fibroid on uterus  ; possible adenomyosis.   Both ureters are functioning by cystoscopy   Complications: None   Condition: Stable   Comments: See typed operative report for full details below:     Details of operation:  Risks and benefits of surgery discussed including the risk of infection, bleeding, deep vein thrombosis, embolism, injury to ANY abdominal or pelvic organ including the bowel, bladder, ureters, nerves and anesthesia.     The patient was placed in the lithotomy position and administered a general anesthetic with endotracheal intubation. The patient was prepped and draped and a time out was called. The circulating nurse, anesthetist,  Scrub nurse and surgeon enunciated the operation to be done and all were in agreement.  The cervix was exposed and a single tooth tenaculum placed on the anterior lip of the cervix. The cervix was sounded and a plastic intrauterine cannula inserted and balloon inflated.     A subumbilical incision was made and a pneumoperitoneum created with a Verris Needle.  A 5 mm trochar and cannular was introduced and then a 5 mm laparoscope introduced. We had entered into  the peritoneal cavity without injury to the bowel. The 5 mm scope and cannular was removed and a 10 mm trochar cannular and laparoscope introduced.  The pelvic contents were visualized and the uterus was found to be enlarged with a 1 cm fibroid on the anterior abdominal wall. The fallopian tubes were swollen and the ovaries were adherent to the fallopian tubes with adhesions.   The fallopian tubes show signs they were occluded before.   The appendix was normal.     The left ovary contained a chocolate cyst with exuded  Chocolate material when ruptured during the surgery.     Because of adhesions and swelling of the fallopian tubes and its being stuck to the pelvic side walls with the adhesion of the ovaries as well it was thought that it would be best to do a laparotomy and total abdominal hysterectomy and bilateral salping-oophorectomy instead of an LAVH which would have been difficult with increased risk of injury.  Total abdominal hysterectomy and bilateral salpingo-oophorectomy:  A Pfannenstiel incision was made and the incision was deepened into the subcutaneous fat.  The rectus sheath was incised transversely and the rectus muscle split in the midline. The peritoneum was tented and entered into with a scissors.    A Farhad - O'Nicola retractor was inserted and the bowels were packed away in the upper abdomen.  The round ligaments were in turn clamped incised and transfixed with No 1 Vicril .  The infundibulo-pelvic ligaments on either side were clamped incised and transfixed with No 1 Vicril.  The uterine arteries were skeletonized. The utero-visical peritoneum was incised transversely and the bladder pushed downwareds.   The uterine vessels were clamped incised and transfixed on either side  The cardinal ligaments were clamped incised and transfixed with no 1 Vicril  The vaginal vault was incised and the uterus with fallopian tubes and ovaries were thus removed.  The vaginal vault was reefed with an '0'  Vicril suture  The broad ligament  Was sutured to the angles of the vagina with No 1 Vicril  The anterior and posterior walls of the vagina was then  Approximated with No 1 Vicril.  A central opening of 1 cm was left open for vaginal drainage.  A Ryan russell drain was placed in the Pouch of Alberto and the end brought out of the right side and  A Bulb  placed.  The selfretaining retractor was removed and Laparotomy sponges removed.  Sponge count and instrument count was correct.  The abdomen was then closed in layers with 2 '0' to the peritoneum. The rectus sheath was closed with No 1 Vicril.  The subcutaneous fat was approximated with a   2 '0' Vicril and the skin closed with rohit.  Marcaine 0.25 was injected into the Skin..     Cystoscopy:  A cystoscopy was done with a 70 degree and 30 degree cystoscope. Both the ureteral openings were visualized and free ejection of orange fluid was observed from the openings of the ureters indicating that both ureters were not injured.     Blood loss was 200 ml and no complications were encountered

## 2017-08-24 NOTE — PLAN OF CARE
Problem: Patient Goal: Social Work Focus  Goal: 1. Patient Goal: Social Work Focus  Outcome: No Change  Assessment done via flowsheet.     LOC: awake, alert and oriented X3  Others present: no one     Dx: total abd hyst     Primary PCP: Antionette Edward  Health Care Directive: none  Pharmacy: [facility]     Living at:home, in [place]  Lives with: boyfriend Zeeshan  Support System: Zeeshan, 15 and 18 yo grand daughters and [employer] 17 yo daughter  Home/[facility] services: none     Adequate Resources for needs (housing, utilities, food/med): yes; but worries about paying hospital bill after discharge, she was encouraged to work with patient financial counselors     Meds and appointments management: independent     Transportation: she drives as does [employer]     Equipment used: none  ADLs: independent  Ambulation: independent  Falls: none  Fall prevention resources given: na     Household: independent with Zeeshan  Community/social activity/work: active and independent     Mental health: no concerns  Substance abuse: does not smoke, drinks a max of twice/month, no drug use  Exposure to violence/abuse: denied  Stressors: no others     [place]: no  VA referral line called: na     Able to Return to Prior Living Arrangements: yes     Goals: return home with boyfriend     Barriers: none known     DINO: low

## 2017-08-24 NOTE — BRIEF OP NOTE
[place]  [facility] Gynecology  Operative Note    Pre-operative diagnosis: SEVERE DYSMENORRHEA, PELVIC PAIN IN FEMALE   Post-operative diagnosis: Pelvic endometriosis possible salpingitis; uterine fibroid; possible adenomyosis   Procedure: Laparoscopy;  Laparotomy and total abdominal hysterectomy and bilateral salpingo-ophrectomy; Cystoscopy   Surgeon: Santhosh May MD   Assistant(s): None   Anesthesia: General Endotracheal Anesthesia   Anesthetist  Caitlin Melchor   Estimated blood loss: 200ml   Total IV fluids: See anesthesia record   Blood transfusion: No transfusion was given during surgery   Total urine output: (See anesthesia record)   Drains: Roland catheter   Specimens: Uterus with fallopian tubes and ovaries   Findings: Fallopian tubes swollen; Endometriosis of the ovaries and pelvis; fibroid on uterus  ; possible adenomyosis.   Both ureters are functioning by cystoscopy   Complications: None   Condition: Stable   Comments: See typed operative report for full details below:    Details of operation:  Risks and benefits of surgery discussed including the risk of infection, bleeding, deep vein thrombosis, embolism, injury to ANY abdominal or pelvic organ including the bowel, bladder, ureters, nerves and anesthesia.    The patient was placed in the lithotomy position and administered a general anesthetic with endotracheal intubation. The patient was prepped and draped and a time out was called. The circulating nurse, anesthetist,  Scrub nurse and surgeon enunciated the operation to be done and all were in agreement.  The cervix was exposed and a single tooth tenaculum placed on the anterior lip of the cervix. The cervix was sounded and a plastic intrauterine cannula inserted and balloon inflated.    A subumbilical incision was made and a pneumoperitoneum created with a Verris Needle.  A 5 mm trochar and cannular was introduced and then a 5 mm laparoscope introduced. We had entered into the  peritoneal cavity without injury to the bowel. The 5 mm scope and cannular was removed and a 10 mm trochar cannular and laparoscope introduced.  The pelvic contents were visualized and the uterus was found to be enlarged with a 1 cm fibroid on the anterior abdominal wall. The fallopian tubes were swollen and the ovaries were adherent to the fallopian tubes with adhesions.   The fallopian tubes show signs they were occluded before.   The appendix was normal.    The left ovary contained a chocolate cyst with exuded  Chocolate material when ruptured during the surgery.    Because of adhesions and swelling of the fallopian tubes and its being stuck to the pelvic side walls with the adhesion of the ovaries as well it was thought that it would be best to do a laparotomy and total abdominal hysterectomy and bilateral salping-oophorectomy instead of an LAVH which would have been difficult with increased risk of injury.  Total abdominal hysterectomy and bilateral salpingo-oophorectomy:  A Pfannenstiel incision was made and the incision was deepened into the subcutaneous fat.  The rectus sheath was incised transversely and the rectus muscle split in the midline. The peritoneum was tented and entered into with a scissors.    A Farhad - O'Petersen retractor was inserted and the bowels were packed away in the upper abdomen.  The round ligaments were in turn clamped incised and transfixed with No 1 Vicril .  The infundibulo-pelvic ligaments on either side were clamped incised and transfixed with No 1 Vicril.  The uterine arteries were skeletonized. The utero-visical peritoneum was incised transversely and the bladder pushed downwareds.   The uterine vessels were clamped incised and transfixed on either side  The cardinal ligaments were clamped incised and transfixed with no 1 Vicril  The vaginal vault was incised and the uterus with fallopian tubes and ovaries were thus removed.  The vaginal vault was reefed with an '0' Vicril  suture  The broad ligament  Was sutured to the angles of the vagina with No 1 Vicril  The anterior and posterior walls of the vagina was then  Approximated with No 1 Vicril.  A central opening of 1 cm was left open for vaginal drainage.  A Ryan russell drain was placed in the Pouch of Alberto and the end brought out of the right side and  A Bulb  placed.  The selfretaining retractor was removed and Laparotomy sponges removed.  Sponge count and instrument count was correct.  The abdomen was then closed in layers with 2 '0' to the peritoneum. The rectus sheath was closed with No 1 Vicril.  The subcutaneous fat was approximated with a   2 '0' Vicril and the skin closed with rohit.  Marcaine 0.25 was injected into the Skin..    Cystoscopy:  A cystoscopy was done with a 70 degree and 30 degree cystoscope. Both the ureteral openings were visualized and free ejection of orange fluid was observed from the openings of the ureters indicating that both ureters were not injured.    Blood loss was 200 ml and no complications were encountered

## 2017-08-25 LAB
COPATH REPORT: NORMAL
GLUCOSE BLDC GLUCOMTR-MCNC: 109 MG/DL (ref 70–99)

## 2017-08-25 PROCEDURE — 25000132 ZZH RX MED GY IP 250 OP 250 PS 637: Performed by: SPECIALIST

## 2017-08-25 PROCEDURE — 12000000 ZZH R&B MED SURG/OB

## 2017-08-25 PROCEDURE — 25000125 ZZHC RX 250: Performed by: OBSTETRICS & GYNECOLOGY

## 2017-08-25 PROCEDURE — 25000128 H RX IP 250 OP 636: Performed by: OBSTETRICS & GYNECOLOGY

## 2017-08-25 PROCEDURE — S0077 INJECTION, CLINDAMYCIN PHOSP: HCPCS | Performed by: OBSTETRICS & GYNECOLOGY

## 2017-08-25 PROCEDURE — 00000146 ZZHCL STATISTIC GLUCOSE BY METER IP

## 2017-08-25 PROCEDURE — 25000132 ZZH RX MED GY IP 250 OP 250 PS 637: Performed by: OBSTETRICS & GYNECOLOGY

## 2017-08-25 RX ORDER — SODIUM CHLORIDE, SODIUM LACTATE, POTASSIUM CHLORIDE, CALCIUM CHLORIDE 600; 310; 30; 20 MG/100ML; MG/100ML; MG/100ML; MG/100ML
INJECTION, SOLUTION INTRAVENOUS CONTINUOUS
Status: DISCONTINUED | OUTPATIENT
Start: 2017-08-25 | End: 2017-08-26 | Stop reason: HOSPADM

## 2017-08-25 RX ORDER — SIMETHICONE 80 MG
80 TABLET,CHEWABLE ORAL EVERY 6 HOURS PRN
Status: DISCONTINUED | OUTPATIENT
Start: 2017-08-25 | End: 2017-08-26 | Stop reason: HOSPADM

## 2017-08-25 RX ADMIN — SODIUM CHLORIDE, POTASSIUM CHLORIDE, SODIUM LACTATE AND CALCIUM CHLORIDE: 600; 310; 30; 20 INJECTION, SOLUTION INTRAVENOUS at 12:14

## 2017-08-25 RX ADMIN — CLINDAMYCIN PHOSPHATE 900 MG: 18 INJECTION, SOLUTION INTRAVENOUS at 16:32

## 2017-08-25 RX ADMIN — CLINDAMYCIN PHOSPHATE 900 MG: 18 INJECTION, SOLUTION INTRAVENOUS at 00:17

## 2017-08-25 RX ADMIN — SIMETHICONE CHEW TAB 80 MG 80 MG: 80 TABLET ORAL at 17:37

## 2017-08-25 RX ADMIN — ACETAMINOPHEN 975 MG: 325 TABLET, FILM COATED ORAL at 18:34

## 2017-08-25 RX ADMIN — ONDANSETRON 4 MG: 4 TABLET, ORALLY DISINTEGRATING ORAL at 05:14

## 2017-08-25 RX ADMIN — CLINDAMYCIN PHOSPHATE 900 MG: 18 INJECTION, SOLUTION INTRAVENOUS at 09:02

## 2017-08-25 RX ADMIN — OXYCODONE HYDROCHLORIDE 10 MG: 5 TABLET ORAL at 12:34

## 2017-08-25 RX ADMIN — ENOXAPARIN SODIUM 40 MG: 40 INJECTION SUBCUTANEOUS at 16:39

## 2017-08-25 RX ADMIN — OXYCODONE HYDROCHLORIDE 10 MG: 5 TABLET ORAL at 16:44

## 2017-08-25 RX ADMIN — ACETAMINOPHEN 975 MG: 325 TABLET, FILM COATED ORAL at 01:35

## 2017-08-25 RX ADMIN — SODIUM CHLORIDE, POTASSIUM CHLORIDE, SODIUM LACTATE AND CALCIUM CHLORIDE: 600; 310; 30; 20 INJECTION, SOLUTION INTRAVENOUS at 05:44

## 2017-08-25 RX ADMIN — OXYCODONE HYDROCHLORIDE 10 MG: 5 TABLET ORAL at 01:36

## 2017-08-25 RX ADMIN — OXYCODONE HYDROCHLORIDE 10 MG: 5 TABLET ORAL at 09:35

## 2017-08-25 RX ADMIN — ACETAMINOPHEN 975 MG: 325 TABLET, FILM COATED ORAL at 12:14

## 2017-08-25 RX ADMIN — DOCUSATE SODIUM 100 MG: 100 CAPSULE, LIQUID FILLED ORAL at 09:35

## 2017-08-25 RX ADMIN — DOCUSATE SODIUM 100 MG: 100 CAPSULE, LIQUID FILLED ORAL at 20:34

## 2017-08-25 ASSESSMENT — PAIN DESCRIPTION - DESCRIPTORS
DESCRIPTORS: DISCOMFORT

## 2017-08-25 NOTE — PLAN OF CARE
"Problem: Goal Outcome Summary  Goal: Goal Outcome Summary  Outcome: No Change  Vitals: VSS. Low grade fever, MD aware. /73 (BP Location: Right arm)  Temp 99.6  F (37.6  C) (Tympanic)  Resp 16  Ht 1.727 m (5' 8\")  Wt 87.1 kg (192 lb)  LMP 07/12/2017  SpO2 95%  BMI 29.19 kg/m2     Pain: 3 to 7/10. Treated with scheduled tylenol and PRN oxy. Pt stated that she can tolerate pain and that her medication has been working well this shift.      Orientation: A&O.     Cardiac: Reg     Lungs: Clear.     ABD: Active. Incisions covered, clean, dry, and intact. Draining outlined, nothing new on this shift. Binder on. NO drain had 40 cc out of red blood. No c/o N/V.      Urinating: Roland present. Will be D/C'd on nights. Adequate amount out of clear, yellow fluid.      Skin: Incision to ABD. Otherwise WNL.     IV fluids: LR at 125.     Antibiotics: Cleocin IV.     Mobility: Up with assist of 1. Walked in the hallways once this shift.      Safety: Call light in reach. Bed alarm on.      Comment: Pt had family visiting most of evening.       Face to face report given with opportunity to observe patient.     Report given to Annamarie Leigh   8/24/2017  11:20 PM                "

## 2017-08-25 NOTE — PLAN OF CARE
Problem: Goal Outcome Summary  Goal: Goal Outcome Summary  Outcome: Improving  Alert and oriented x3. Afebrile. C/o pain that increases with getting out of bed and back into bed. Relieved from repositioning, ambulation, and 10mg oxycodone. VSS. First postoperative dressing change today. Incision closed with staples with Right NO drain suture intact. Moderate bloody drainage on surgical dressing, incision clean and intact, no visual signs of infection. Re dressed with xeroform and dry gauze, secured with medipore tape 2 occurrence of voiding after Roland removal at 0530 this morning. Tolerated ambulation x2 today. ABX- cleocin every 8 hours. IV- 25mL/hr LR. Resting comfortably in chair. Call light within reach.

## 2017-08-25 NOTE — PLAN OF CARE
Problem: Goal Outcome Summary  Goal: Goal Outcome Summary  Outcome: No Change  VSS. Pain adequately controlled with scheduled tylenol and 10mg Oxycodone. TCDB done independently. Lung sounds clear. NO drain in place with small amount of bloody drainage. Roland removed at 0515. Up with assist of one person.

## 2017-08-25 NOTE — PROGRESS NOTES
POD 2    S:   Still having some post operative pain         Has passed flatus    O:   No pallor         Lungs clear         Abdomen soft not distended          Wound dry and not infected         NO drainage still some output.         Calves not tender          Hb:  11.1  WBC  10.2    A:    Improving    P:   Continue with Lovenox         Keep NO drain till tomorrow.         View to discharge tomorrow.         Home with Lovenox because of Factor V Jaswinder

## 2017-08-25 NOTE — PROGRESS NOTES
Shweta was provided with the healthcare directive information for both she and her boyfriend Zeeshan. No new needs. She anticipates going home with her boyfriend, with additional help from kids and grandkids.

## 2017-08-25 NOTE — PLAN OF CARE
Face to face report given with opportunity to observe patient.    Report given to BETTY Trinh and BETTY Del Rosario.    Michelle Jauregui   8/25/2017  3:52 PM

## 2017-08-25 NOTE — PHARMACY
[facility]    Pharmacy    Antimicrobial Stewardship Note     Current antimicrobial therapy:  Anti-infectives (Future)    Start     Dose/Rate Route Frequency Ordered Stop    08/24/17 1615  clindamycin (CLEOCIN) infusion 900 mg      900 mg  50 mL/hr over 60 Minutes Intravenous EVERY 8 HOURS 08/24/17 1607          Indication: Surgical Prophylaxis, has low grade fever. Had swollen fallopian tubes suspicious of infection     Pertinent labs:  Creatinine   Creatinine   Date Value Ref Range Status   08/23/2017 0.84 0.52 - 1.04 mg/dL Final   08/10/2017 0.84 0.52 - 1.04 mg/dL Final   06/21/2017 0.78 0.52 - 1.04 mg/dL Final     WBC   WBC   Date Value Ref Range Status   08/24/2017 10.2 4.0 - 11.0 10e9/L Final   08/10/2017 6.0 4.0 - 11.0 10e9/L Final   06/21/2017 7.2 4.0 - 11.0 10e9/L Final     ANC No components found for: ANC     Culture Results: N/A     Recommendations/Interventions:  1. None at this time     Bernarda Mondragon RPH  August 25, 2017

## 2017-08-25 NOTE — PLAN OF CARE
Face to face report given with opportunity to observe patient.    Report given to Michelle HERNÁNDEZ and Laney Edward   8/25/2017  7:22 AM

## 2017-08-25 NOTE — PROGRESS NOTES
Pathology of the uterus fallopian tubes and ovaries is showing florid adenomyosis and uterine fibroids:    SPECIMEN(S):   A: Fallopian tube and ovary, left   B: Uterus, cervix, right fallopian tube, and right ovary     FINAL DIAGNOSIS:   A: Left ovary and fallopian tube, salpingo-oophorectomy   - Luteal cysts   - Fallopian tube     B: Uterus, cervix, right fallopian tube and ovary, hysterectomy with   salpingo-oophorectomy   - Adenomyosis, florid   - Leiomyoma   - Endometrial polyp   - Cervical polyp   - Proliferative endometrium     Electronically signed out by:     Eduardo Phoenix M.D.

## 2017-08-26 VITALS
TEMPERATURE: 98.4 F | SYSTOLIC BLOOD PRESSURE: 131 MMHG | OXYGEN SATURATION: 95 % | DIASTOLIC BLOOD PRESSURE: 74 MMHG | HEIGHT: 68 IN | RESPIRATION RATE: 16 BRPM | WEIGHT: 201.72 LBS | BODY MASS INDEX: 30.57 KG/M2

## 2017-08-26 LAB — PLATELET # BLD AUTO: 237 10E9/L (ref 150–450)

## 2017-08-26 PROCEDURE — S0077 INJECTION, CLINDAMYCIN PHOSP: HCPCS | Performed by: OBSTETRICS & GYNECOLOGY

## 2017-08-26 PROCEDURE — 25000125 ZZHC RX 250: Performed by: OBSTETRICS & GYNECOLOGY

## 2017-08-26 PROCEDURE — 85049 AUTOMATED PLATELET COUNT: CPT | Performed by: OBSTETRICS & GYNECOLOGY

## 2017-08-26 PROCEDURE — 36415 COLL VENOUS BLD VENIPUNCTURE: CPT | Performed by: OBSTETRICS & GYNECOLOGY

## 2017-08-26 PROCEDURE — 25000132 ZZH RX MED GY IP 250 OP 250 PS 637: Performed by: OBSTETRICS & GYNECOLOGY

## 2017-08-26 PROCEDURE — 25000128 H RX IP 250 OP 636: Performed by: OBSTETRICS & GYNECOLOGY

## 2017-08-26 PROCEDURE — 25000132 ZZH RX MED GY IP 250 OP 250 PS 637: Performed by: SPECIALIST

## 2017-08-26 RX ORDER — ASPIRIN 81 MG
100 TABLET, DELAYED RELEASE (ENTERIC COATED) ORAL DAILY
Qty: 24 TABLET | Refills: 0 | Status: SHIPPED | OUTPATIENT
Start: 2017-08-26 | End: 2017-08-31

## 2017-08-26 RX ADMIN — ENOXAPARIN SODIUM 40 MG: 40 INJECTION SUBCUTANEOUS at 15:25

## 2017-08-26 RX ADMIN — ACETAMINOPHEN 975 MG: 325 TABLET, FILM COATED ORAL at 05:18

## 2017-08-26 RX ADMIN — CLINDAMYCIN PHOSPHATE 900 MG: 18 INJECTION, SOLUTION INTRAVENOUS at 08:21

## 2017-08-26 RX ADMIN — DOCUSATE SODIUM 100 MG: 100 CAPSULE, LIQUID FILLED ORAL at 08:20

## 2017-08-26 RX ADMIN — ONDANSETRON 4 MG: 2 INJECTION INTRAMUSCULAR; INTRAVENOUS at 14:40

## 2017-08-26 RX ADMIN — CLINDAMYCIN PHOSPHATE 900 MG: 18 INJECTION, SOLUTION INTRAVENOUS at 15:23

## 2017-08-26 RX ADMIN — OXYCODONE HYDROCHLORIDE 10 MG: 5 TABLET ORAL at 00:39

## 2017-08-26 RX ADMIN — CLINDAMYCIN PHOSPHATE 900 MG: 18 INJECTION, SOLUTION INTRAVENOUS at 00:41

## 2017-08-26 RX ADMIN — ACETAMINOPHEN 975 MG: 325 TABLET, FILM COATED ORAL at 12:24

## 2017-08-26 RX ADMIN — SIMETHICONE CHEW TAB 80 MG 80 MG: 80 TABLET ORAL at 00:39

## 2017-08-26 ASSESSMENT — PAIN DESCRIPTION - DESCRIPTORS: DESCRIPTORS: SHARP

## 2017-08-26 NOTE — PLAN OF CARE
Patient discharged at  via wheel chair accompanied by significant other and staff. Prescriptions filled and sent with patient upon discharge. All belongings sent with patient.     Discharge instructions reviewed with pt. Listed belongings gathered and returned to patient.     Patient discharged to home  Report called to n/a    Core Measures and Vaccines  Core Measures applicable during stay: No. If yes, state diagnosis:   Pneumonia and Influenza given prior to discharge, if indicated: N/A    Surgical Patient   Surgical Procedures during stay: lap/open hyst  Did patient receive discharge instruction on wound care and recognition of infection symptoms? Yes    MISC  Follow up appointment made:  No-unable to do on weekend. Pt instructed mult times to make f/u appt on monday  Home and hospital aquired medications returned to patient: N/A  Patient reports pain was well managed at discharge: Yes

## 2017-08-26 NOTE — PLAN OF CARE
"Problem: Goal Outcome Summary  Goal: Goal Outcome Summary  Outcome: Improving  Pt alert and oriented. VS: Temp 99.1, 98.6 /74, HR 77, RR 16, Sats 97% on room air. Pt reports pain of 7/10 which she describes mostly as \"gas pain\". Adequately controlled with scheduled acetaminophen, PRN Roxicodone 10mg and simethicone. Cardiac WDL. Resp WDL, lung sounds clear. Tolerates regular diet. No BM since prior to surgery, but passes gas easily with help of PRN simethicone. Abdominal dressing with scant drainage marked, NO drain with 90 mL of red, bloody drainage during this shift. No other skin concerns. Pt ambulated around unit x2 this shift with a steady gait and SBA. Patient sleeping, call light within reach, will continue to monitor.      Problem: Hysterectomy (Adult)  Goal: Signs and Symptoms of Listed Potential Problems Will be Absent or Manageable (Hysterectomy)  Signs and symptoms of listed potential problems will be absent or manageable by discharge/transition of care (reference Hysterectomy (Adult) CPG).   Outcome: Improving    08/25/17 1630   Hysterectomy   Problems Assessed (Hysterectomy) all   Problems Present (Hysterectomy) pain         Face to face report given with opportunity to observe patient.    Report given to Mami Whatley   8/25/2017  11:01 PM      "

## 2017-08-26 NOTE — PLAN OF CARE
"Problem: Goal Outcome Summary  Goal: Goal Outcome Summary  Outcome: Improving  Pt up independently in room. IV fluids at 25/hr. Prn Zofran given for nausea once this shift. Pt not able to eat much without getting nauseous. MD removed NO bulb and changed abdominal dressing, dressing intact. Pt on room air. Minimal pain, managed weill with scheduled Tylenol. Pt to discharge after 1630 dose of Lovenox. /85 (BP Location: Right arm)  Temp 98.8  F (37.1  C) (Tympanic)  Resp 16  Ht 1.727 m (5' 8\")  Wt 91.5 kg (201 lb 11.5 oz)  LMP 07/12/2017  SpO2 96%  BMI 30.67 kg/m2.     Face to face report given with opportunity to observe patient.    Report given to Christy Barbour   8/26/2017  3:44 PM      "

## 2017-08-26 NOTE — PLAN OF CARE
Dr. Marmolejo here at 0900 to remove NO and do dressing change.  25cc sero-sang drainage from ON; incision is intact with staples; no drainage from staples.  Applied betadine to incision and NO removal site, then a 8X4 gauze pad.  ABD binder reapplied; patient tolerated well.  Patient is to wash incision in shower with soap and water; educated on incision hygiene.  Dr. Marmolejo is to order a follow-up for next week upon discharge; per MD.

## 2017-08-26 NOTE — PLAN OF CARE
Problem: Goal Outcome Summary  Goal: Goal Outcome Summary  Outcome: Improving  Start of shift pt had initial pain in abdomen. Stated it was gas pain. PRNs given but was also encouraged to walk. Pt did do entire loop. Steady. Slower walking but did well. Dressing is marked drainage w/o change/ NO putting out about 100cc red, thin output.   appetie remains poor. Drinking water well. Voiding well. NS 25/hr  Cleocin IV.   lex and/or scheduled tylenol for pain, simethicone for gas pain.    Problem: Hysterectomy (Adult)  Goal: Signs and Symptoms of Listed Potential Problems Will be Absent or Manageable (Hysterectomy)  Signs and symptoms of listed potential problems will be absent or manageable by discharge/transition of care (reference Hysterectomy (Adult) CPG).   Outcome: Improving    08/26/17 0406   Hysterectomy   Problems Assessed (Hysterectomy) all   Problems Present (Hysterectomy) none

## 2017-08-26 NOTE — PROGRESS NOTES
Name: Shweta Harris    MRN#: [id number]    Reason for Hospitalization: SEVERE DYSMENORRHEA, PELVIC PAIN IN FEMALE  Endometriosis of pelvis    Discharge Date: 08/26/2017    Patient / Family response to discharge plan: in agreement    Follow-Up Appt: No future appointments.    Other Providers (Care Coordinator, County Services, PCA services etc): no    Discharge Disposition: home, transported by significant other Zeeshan.    Antionette Joseph

## 2017-08-26 NOTE — DISCHARGE SUMMARY
Physician Discharge Summary     Patient ID:  Shweta Harris  [id number]  51 year old  1965    Admit date: 8/23/2017    Discharge date and time: 8/26/2017     Admitting Physician: Santhosh May MD     Discharge Physician: Polo Marmolejo MD    Admission Diagnoses: SEVERE DYSMENORRHEA, PELVIC PAIN IN FEMALE  Endometriosis of pelvis    Discharge Diagnoses: Same    Admission Condition: good    Discharged Condition: good    Indication for Admission:52 y/o female with severe d ysmenorrrea and pelvic pain admitted por diagnostic laparoscopy    Hospital Course: Surgery converted to ELVIN/BSO indication being severe disseminated endometriosis    Consults: none    Significant Diagnostic Studies: endoscopy,: laparotomy    Treatments: surgery: as above    Discharge Exam:  Abd:Soft, BS present, Incision clean, intact, staples in place,  Ext: no masses or tenderness    Disposition: home    Patient Instructions:   Current Discharge Medication List      START taking these medications    Details   enoxaparin (LOVENOX) 40 MG/0.4ML injection Inject 0.4 mLs (40 mg) Subcutaneous every 24 hours  Qty: 12 mL, Refills: 0    Associated Diagnoses: Heterozygous factor V Leiden mutation (H)      docusate sodium (COLACE) 100 MG tablet Take 100 mg by mouth daily  Qty: 24 tablet, Refills: 0    Associated Diagnoses: Endometriosis of pelvis         STOP taking these medications       zolpidem (AMBIEN CR) 6.25 MG CR tablet Comments:   Reason for Stopping:             Activity: activity as tolerated and no driving for today, ambulate in house, bedrest and no heavy lifting, pushing, pulling with the implant side for 2 months  Diet: regular diet  Wound Care: as directed    Follow-up with GYN in 4-5 days for clip removal}.  Call, RTC, ED if any problems    Signed:  Polo Marmolejo  8/26/2017  12:34 PM

## 2017-08-26 NOTE — PROGRESS NOTES
Patient discharged at 1650 via wheelchair after IV antibiotic was complete. Patient denies questions regarding discharge instructions. Encouraged brat diet until she was feeling better then progress slowly. Encouraged gatorade if she didn't want to eat. Discharged with significant other and all belongings.

## 2017-08-27 ENCOUNTER — HOSPITAL ENCOUNTER (EMERGENCY)
Facility: HOSPITAL | Age: 52
Discharge: HOME | End: 2017-08-27
Attending: NURSE PRACTITIONER | Admitting: NURSE PRACTITIONER
Payer: COMMERCIAL

## 2017-08-27 VITALS
SYSTOLIC BLOOD PRESSURE: 131 MMHG | RESPIRATION RATE: 18 BRPM | HEART RATE: 81 BPM | TEMPERATURE: 97.9 F | DIASTOLIC BLOOD PRESSURE: 83 MMHG | OXYGEN SATURATION: 99 %

## 2017-08-27 DIAGNOSIS — L23.9 ALLERGIC CONTACT DERMATITIS, UNSPECIFIED TRIGGER: ICD-10-CM

## 2017-08-27 DIAGNOSIS — R21 RASH AND NONSPECIFIC SKIN ERUPTION: ICD-10-CM

## 2017-08-27 DIAGNOSIS — R11.0 NAUSEA: ICD-10-CM

## 2017-08-27 PROCEDURE — 99214 OFFICE O/P EST MOD 30 MIN: CPT | Performed by: NURSE PRACTITIONER

## 2017-08-27 PROCEDURE — 99212 OFFICE O/P EST SF 10 MIN: CPT

## 2017-08-27 RX ORDER — PREDNISONE 20 MG/1
TABLET ORAL
Qty: 10 TABLET | Refills: 0 | Status: SHIPPED | OUTPATIENT
Start: 2017-08-27 | End: 2017-08-31

## 2017-08-27 RX ORDER — ONDANSETRON 8 MG/1
8 TABLET, FILM COATED ORAL EVERY 8 HOURS PRN
Qty: 30 TABLET | Refills: 0 | Status: SHIPPED | OUTPATIENT
Start: 2017-08-27 | End: 2017-08-31

## 2017-08-27 ASSESSMENT — ENCOUNTER SYMPTOMS
MUSCULOSKELETAL NEGATIVE: 1
RESPIRATORY NEGATIVE: 1
NAUSEA: 1
CARDIOVASCULAR NEGATIVE: 1
FEVER: 0
DIARRHEA: 0
VOMITING: 0
EYES NEGATIVE: 1
NEUROLOGICAL NEGATIVE: 1

## 2017-08-27 NOTE — ED AVS SNAPSHOT
[facility]    [address]    [place] [address]    Phone:  [phone]                                       Shweta Harris   MRN: [id number]    Department:  [facility]   Date of Visit:  8/27/2017           Patient Information     Date Of Birth          1965        Your diagnoses for this visit were:     Rash and nonspecific skin eruption     Nausea        You were seen by Leslie Guerra NP.      Follow-up Information     Follow up with Antionette Edward PA.    Specialty:  Family Practice    Why:  As needed, If symptoms worsen    Contact information:    [facility]  [address]  [place] [address] [phone]          Follow up with [facility].    Specialty:  EMERGENCY MEDICINE    Why:  As needed, If symptoms worsen    Contact information:    [address]  [place] [address] [phone]    Additional information:    From [facility]: Take [address]. Turn left at [address]. Turn left at the first stoplight on [address]. At the first stop sign, take a right onto [address]. Take a left into the parking lot and continue through until you reach the North enterance of the building.       From [place]: Take [address]. Take the [address] ramp towards [place]. Turn left onto [address]. Take a slight right onto [address]. Turn left at the first stoplight on [address]. At the first stop sign, take a right onto [address]. Take a left into the parking lot and continue through until you reach the North enterance of the building.       From [place]: Take [address]. Take a right at [address]. At the first stop sign, take a right onto [address]. Take a left into the parking lot and continue through until you reach the North enterance of the building.         Please follow up.    Why:  ob/gyn      Discharge References/Attachments      SKIN RASHES, SELF-CARE FOR (ENGLISH)         Review of your medicines      START taking        Dose / Directions Last dose taken    ondansetron 8 MG tablet   Commonly known as:  ZOFRAN   Dose:  8 mg   Quantity:  30 tablet        Take 1 tablet (8 mg) by mouth every 8 hours as needed for nausea   Refills:  0        predniSONE 20 MG tablet   Commonly known as:  DELTASONE   Quantity:  10 tablet        Take two tablets (= 40mg) each day for 5 (five) days   Refills:  0          Our records show that you are taking the medicines listed below. If these are incorrect, please call your family doctor or clinic.        Dose / Directions Last dose taken    docusate sodium 100 MG tablet   Commonly known as:  COLACE   Dose:  100 mg   Quantity:  24 tablet        Take 100 mg by mouth daily   Refills:  0        enoxaparin 40 MG/0.4ML injection   Commonly known as:  LOVENOX   Dose:  40 mg   Quantity:  12 mL        Inject 0.4 mLs (40 mg) Subcutaneous every 24 hours   Refills:  0                Prescriptions were sent or printed at these locations (2 Prescriptions)                   [facility] [address] [place] [address] [address] AT [facility] [address]   [address], [facility] [address]    Telephone:  [phone]   Fax:  [phone]   Hours:                  E-Prescribed (2 of 2)         predniSONE (DELTASONE) 20 MG tablet               ondansetron (ZOFRAN) 8 MG tablet                Orders Needing Specimen Collection     None      Pending Results     No orders found from 8/25/2017 to 8/28/2017.            Pending Culture Results     No orders found from 8/25/2017 to 8/28/2017.            Thank you for choosing [facility]       Thank you for choosing [facility] for your care. Our goal is always to provide you with excellent care. Hearing back from our patients is one way we can continue to improve our services. Please take a few minutes to complete the written survey that you may receive in the mail after you visit with  us. Thank you!        [employer] Information     [employer] gives you secure access to your electronic health record. If you see a primary care provider, you can also send messages to your care team and make appointments. If you have questions, please call your primary care clinic.  If you do not have a primary care provider, please call [phone] and they will assist you.        Care EveryWhere ID     This is your Care EveryWhere ID. This could be used by other organizations to access your [facility] medical records  [id number]        Equal Access to Services     JEANNE SOLIZ : Basil Gee, waoni chaudhryadaha, qatodd kaalmavidya caldera, ángela gill. So [facility] [phone].    ATENCIÓN: Si habla español, tiene a boone disposición servicios gratuitos de asistencia lingüística. Llame al [phone].    We comply with applicable federal civil rights laws and [place] laws. We do not discriminate on the basis of race, color, national origin, age, disability sex, sexual orientation or gender identity.            After Visit Summary       This is your record. Keep this with you and show to your community pharmacist(s) and doctor(s) at your next visit.

## 2017-08-27 NOTE — ED AVS SNAPSHOT
[facility]    [address] [address]    Phone:  [phone]                                       Shweta Harris   MRN: [id number]    Department:  [facility]   Date of Visit:  8/27/2017           After Visit Summary Signature Page     I have received my discharge instructions, and my questions have been answered. I have discussed any challenges I see with this plan with the nurse or doctor.    ..........................................................................................................................................  Patient/Patient Representative Signature      ..........................................................................................................................................  Patient Representative Print Name and Relationship to Patient    ..................................................               ................................................  Date                                            Time    ..........................................................................................................................................  Reviewed by Signature/Title    ...................................................              ..............................................  Date                                                            Time

## 2017-08-27 NOTE — ED NOTES
Patient presents with a rash that started yesterday which is under her arm pit and on stomach.  Patient was discharged yesterday after having a hysterectomy.

## 2017-08-27 NOTE — ED PROVIDER NOTES
History     Chief Complaint   Patient presents with     Rash     under arm pit, under breasts     The history is provided by the patient and a relative. No  was used.     Shweta Harris is a 51 year old female who presents with a rash under her arms worse on the right but bilaterally then generalized to her abdomen  worse in 2 spots under her breasts This started yesterday when she was in the hospital after her hysterecotomy and has progressed this am.  They thought perhaps her gown had been rubbing yesterday prior to d/c.  She was d/c'd yesterday after a vaginal hysterectomy. She took benadryl a few hours ago but sx have slowly been worsening again. She reports she has not been taking anything for pain at home and she has only been using colace that is new.     She has not had any breathing difficulty. Has been sipping fluids , limited solids.     I have reviewed the Medications, Allergies, Past Medical and Surgical History, and Social History in the Epic system.    Allergies:   Allergies   Allergen Reactions     Seasonal Allergies          No current facility-administered medications on file prior to encounter.   Current Outpatient Prescriptions on File Prior to Encounter:  enoxaparin (LOVENOX) 40 MG/0.4ML injection Inject 0.4 mLs (40 mg) Subcutaneous every 24 hours   docusate sodium (COLACE) 100 MG tablet Take 100 mg by mouth daily       Patient Active Problem List   Diagnosis     Heterozygous factor V Leiden mutation (H)     ACP (advance care planning)     Endometriosis of pelvis       Past Surgical History:   Procedure Laterality Date     COLONOSCOPY N/A 12/1/2014    Procedure: COLONOSCOPY;  Surgeon: Blanche Trivedi MD;  [facility]     CYSTOSCOPY N/A 8/23/2017    Procedure: CYSTOSCOPY;;  Surgeon: Santhosh May MD;  Location: [facility] OR     GYN SURGERY       HYSTERECTOMY TOTAL ABDOMINAL  8/23/2017    Procedure: HYSTERECTOMY TOTAL ABDOMINAL;;  Surgeon: Santhosh May MD;  Location: [facility]  "OR     LAPAROSCOPIC ASSISTED HYSTERECTOMY VAGINAL, BILATERAL SALPINGO-OOPHORECTOMY, COMBINED N/A 8/23/2017    Procedure: COMBINED LAPAROSCOPIC ASSISTED HYSTERECTOMY VAGINAL, SALPINGO-OOPHORECTOMY;  LAPAROSCOPIC ASSISTED VAGINAL HYSTERECTOMY, BILATERAL SALPINGO-OOPHORECTOMY ATTEMPTED, total abdominal hysterctomy with bilateral salpingo-oopherectomy, cystoscopy;  Surgeon: Santhosh May MD;  Location: [facility] OR     tubal ligation  1991       Social History   Substance Use Topics     Smoking status: Never Smoker     Smokeless tobacco: Never Used      Comment: No passive exposure     Alcohol use Yes      Comment: Occasionally       Most Recent Immunizations   Administered Date(s) Administered     Influenza (IIV3) 11/14/2007     Influenza Vaccine IM 3yrs+ 4 Valent IIV4 10/09/2014     TD (ADULT, 7+) 04/05/2006     TDAP Vaccine (Adacel) 06/21/2017       BMI: Estimated body mass index is 30.67 kg/(m^2) as calculated from the following:    Height as of 8/23/17: 1.727 m (5' 8\").    Weight as of 8/26/17: 91.5 kg (201 lb 11.5 oz).        Review of Systems   Constitutional: Negative for fever.   HENT: Negative.    Eyes: Negative.    Respiratory: Negative.    Cardiovascular: Negative.    Gastrointestinal: Positive for nausea (\"not quite sure, I feel like I might be\"). Negative for diarrhea and vomiting.   Genitourinary: Negative.  Negative for decreased urine volume.   Musculoskeletal: Negative.    Skin: Positive for rash.   Allergic/Immunologic: Negative for environmental allergies.   Neurological: Negative.        Physical Exam   BP: 131/83  Pulse: 81  Temp: 97.9  F (36.6  C)  Resp: 18  SpO2: 99 %  Physical Exam   Constitutional: She is oriented to person, place, and time. She appears well-developed and well-nourished.   HENT:   Head: Normocephalic.   Neck: Normal range of motion.   Cardiovascular: Normal rate.    Pulmonary/Chest: Effort normal.   Neurological: She is alert and oriented to person, place, and time.   Skin: Skin is " warm and dry. Rash noted.   Erythematous flat rash under her right axilla area that extends about 5 cm across and 11 cm laterally and then less so under her left axilla area ,  2 areas that are 7 cm x  3 cm and 8 cm x 3 cm that is more blotchy erythematous and is under her breasts and then generalized sandpaper appearing rash over the rest of her abdomen. She reports this is pruritic   Nursing note and vitals reviewed.      ED Course     ED Course     Procedures                 Labs Ordered and Resulted from Time of ED Arrival Up to the Time of Departure from the ED - No data to display    Assessments & Plan (with Medical Decision Making)     I have reviewed the nursing notes.  The rash on her abdomen appears to be where the where potentially tape or adhesive was. This does not explain it under her axilla .  Will give her a course of prednisone 40 mg for 5 days with the hope that she only has to use this for 1-2 days.  When sx are resolved she may stop the medication. Would have her start some claritin as well. Suspect this is some type of contact dermatitis , certainly suspicious for adhesive on the abdomen. Zofran for mild nausea .  She has f/u scheduled with Dr May for next week.  Call sooner for worsening sx or here if needed.      I have reviewed the findings, diagnosis, plan and need for follow up with the patient and her sister      Discharge Medication List as of 8/27/2017 11:07 AM      START taking these medications    Details   predniSONE (DELTASONE) 20 MG tablet Take two tablets (= 40mg) each day for 5 (five) days, Disp-10 tablet, R-0, E-Prescribe      ondansetron (ZOFRAN) 8 MG tablet Take 1 tablet (8 mg) by mouth every 8 hours as needed for nausea, Disp-30 tablet, R-0, E-Prescribe             Final diagnoses:   Rash and nonspecific skin eruption   Nausea   Allergic contact dermatitis, unspecified trigger   Pt verbalizes understanding and agreement with plan.  Follow up for worsening  symptoms      8/27/2017   [facility]     Leslie Guerra NP  08/27/17 1936

## 2017-08-28 ENCOUNTER — TELEPHONE (OUTPATIENT)
Dept: CASE MANAGEMENT | Facility: HOSPITAL | Age: 52
End: 2017-08-28

## 2017-08-28 NOTE — TELEPHONE ENCOUNTER
Shweta Miltonulkner, was discharged to home on 8/26/17   from [facility]. I spoke today with Shweta regarding her discharge.   She  indicates she  receive(d) sufficient information upon discharge. Medications were reviewed in full on discharge, including: Medications to be started; medications to be stopped. Prescriptions filled and sent with Shweta on discharge.  Medications are being taken as prescribed.   She  Indicates she still needs to make her follow up appointment  and will get this  Done.   She  did not have any questions regarding discharge instructions or condition.  Per their request, the following employee (s) can be recognized for their outstanding services delivered:   Her care was good.  Suggestions to improve service: nothing indicated  She was informed she may receive a survey in the mail from the hospital. Asked if she  would kindly complete the survey in order for [facility] to know if services fully met patient needs.

## 2017-08-31 ENCOUNTER — OFFICE VISIT (OUTPATIENT)
Dept: OBGYN | Facility: OTHER | Age: 52
End: 2017-08-31
Attending: SPECIALIST
Payer: COMMERCIAL

## 2017-08-31 VITALS
BODY MASS INDEX: 28.95 KG/M2 | TEMPERATURE: 98.8 F | OXYGEN SATURATION: 98 % | DIASTOLIC BLOOD PRESSURE: 70 MMHG | WEIGHT: 191 LBS | HEART RATE: 88 BPM | SYSTOLIC BLOOD PRESSURE: 110 MMHG | HEIGHT: 68 IN

## 2017-08-31 DIAGNOSIS — Z48.89 POSTOPERATIVE VISIT: Primary | ICD-10-CM

## 2017-08-31 PROCEDURE — 99024 POSTOP FOLLOW-UP VISIT: CPT | Performed by: SPECIALIST

## 2017-08-31 RX ORDER — LORATADINE 10 MG/1
10 TABLET ORAL PRN
COMMUNITY
End: 2018-07-12

## 2017-08-31 ASSESSMENT — PAIN SCALES - GENERAL: PAINLEVEL: MILD PAIN (3)

## 2017-08-31 NOTE — MR AVS SNAPSHOT
After Visit Summary   8/31/2017    Shweta Harris    MRN: [id number]           Patient Information     Date Of Birth          1965        Visit Information        Provider Department      8/31/2017 3:30 PM Polo Marmolejo MD [facility] [address]        Today's Diagnoses     Postoperative visit    -  1       Follow-ups after your visit        Your next 10 appointments already scheduled     Oct 06, 2017  9:00 AM CDT   (Arrive by 8:45 AM)   Post Op with Santhosh May MD   [facility] ([facility] )    [address] [place] [address]   [phone]              Who to contact     If you have questions or need follow up information about today's clinic visit or your schedule please contact [facility] directly at [phone].  Normal or non-critical lab and imaging results will be communicated to you by MyChart, letter or phone within 4 business days after the clinic has received the results. If you do not hear from us within 7 days, please contact the clinic through MyChart or phone. If you have a critical or abnormal lab result, we will notify you by phone as soon as possible.  Submit refill requests through [employer] or call your pharmacy and they will forward the refill request to us. Please allow 3 business days for your refill to be completed.          Additional Information About Your Visit        MyChart Information     [employer] gives you secure access to your electronic health record. If you see a primary care provider, you can also send messages to your care team and make appointments. If you have questions, please call your primary care clinic.  If you do not have a primary care provider, please call [phone] and they will assist you.        Care EveryWhere ID     This is your Care EveryWhere ID. This could be used by other organizations to access your [facility] medical records  [id number]        Your Vitals Were     Pulse  "Temperature Height Last Period Pulse Oximetry BMI (Body Mass Index)    88 98.8  F (37.1  C) 5' 8\" (1.727 m) 07/12/2017 98% 29.04 kg/m2       Blood Pressure from Last 3 Encounters:   08/31/17 110/70   08/27/17 131/83   08/26/17 131/74    Weight from Last 3 Encounters:   08/31/17 191 lb (86.6 kg)   08/26/17 201 lb 11.5 oz (91.5 kg)   08/10/17 195 lb (88.5 kg)              Today, you had the following     No orders found for display       Primary Care Provider Office Phone # Fax #    DESTIN Zuleta [phone]       [facility] [address]  [facility] [address]        Equal Access to Services     JEANNE SOLIZ : Hadii aad ku hadasho Sostephen, waaxda luqadaha, qaybta kaalmada adeegyada, ángela stephens . So [facility] [phone].    ATENCIÓN: Si habla español, tiene a boone disposición servicios gratuitos de asistencia lingüística. Olaf al [phone].    We comply with applicable federal civil rights laws and [place] laws. We do not discriminate on the basis of race, color, national origin, age, disability sex, sexual orientation or gender identity.            Thank you!     Thank you for choosing [facility]  for your care. Our goal is always to provide you with excellent care. Hearing back from our patients is one way we can continue to improve our services. Please take a few minutes to complete the written survey that you may receive in the mail after your visit with us. Thank you!             Your Updated Medication List - Protect others around you: Learn how to safely use, store and throw away your medicines at www.disposemymeds.org.          This list is accurate as of: 8/31/17 11:59 PM.  Always use your most recent med list.                   Brand Name Dispense Instructions for use Diagnosis    enoxaparin 40 MG/0.4ML injection    LOVENOX    12 mL    Inject 0.4 mLs (40 mg) Subcutaneous every 24 hours    Heterozygous factor V Leiden " mutation (H)       loratadine 10 MG tablet    CLARITIN     Take 10 mg by mouth daily

## 2017-08-31 NOTE — NURSING NOTE
"Chief Complaint   Patient presents with     Surgical Followup       Initial /70  Pulse 88  Temp 98.8  F (37.1  C)  Ht 5' 8\" (1.727 m)  Wt 191 lb (86.6 kg)  LMP 07/12/2017  SpO2 98%  BMI 29.04 kg/m2 Estimated body mass index is 29.04 kg/(m^2) as calculated from the following:    Height as of this encounter: 5' 8\" (1.727 m).    Weight as of this encounter: 191 lb (86.6 kg).  Medication Reconciliation: complete    Charis Berumen      "

## 2017-09-01 ENCOUNTER — TELEPHONE (OUTPATIENT)
Dept: FAMILY MEDICINE | Facility: OTHER | Age: 52
End: 2017-09-01

## 2017-09-01 DIAGNOSIS — L50.9 HIVES: Primary | ICD-10-CM

## 2017-09-01 RX ORDER — METHYLPREDNISOLONE 4 MG
TABLET, DOSE PACK ORAL
Qty: 21 TABLET | Refills: 0 | Status: SHIPPED | OUTPATIENT
Start: 2017-09-01 | End: 2017-10-06

## 2017-09-01 NOTE — PROGRESS NOTES
S:  Shweta is seen today for followup of total abdominal hysterectomy and bilateral salpingo-oophorectomy on 8/23/17 by Dr. May.  She has been doing well, voiding, bowel movements, ambulating.  She does complain of some loss of skin sensation from her right hip and down across the quadriceps to the knee.  She does not experience any weakness to her gait.  She is here for staple removal  today.         Patient Active Problem List   Diagnosis     Heterozygous factor V Leiden mutation (H)     ACP (advance care planning)     Endometriosis of pelvis            Past Medical History:   Diagnosis Date     Dermatitis fungal 6/30/2011     Factor V Leiden 6/28/2012     Family history of other blood disorders 6/30/2011     Heterozygous factor V Leiden mutation (H) 10/9/2014     Heterozygous factor V Leiden mutation (H)      Palpitations 9/22/2006            Past Surgical History:   Procedure Laterality Date     COLONOSCOPY N/A 12/1/2014    Procedure: COLONOSCOPY;  Surgeon: Blanche Trivedi MD;  [facility]     CYSTOSCOPY N/A 8/23/2017    Procedure: CYSTOSCOPY;;  Surgeon: Santhosh May MD;  Location[facility]     GYN SURGERY       HYSTERECTOMY TOTAL ABDOMINAL  8/23/2017    Procedure: HYSTERECTOMY TOTAL ABDOMINAL;;  Surgeon: Santhosh May MD;  [facility]     LAPAROSCOPIC ASSISTED HYSTERECTOMY VAGINAL, BILATERAL SALPINGO-OOPHORECTOMY, COMBINED N/A 8/23/2017    Procedure: COMBINED LAPAROSCOPIC ASSISTED HYSTERECTOMY VAGINAL, SALPINGO-OOPHORECTOMY;  LAPAROSCOPIC ASSISTED VAGINAL HYSTERECTOMY, BILATERAL SALPINGO-OOPHORECTOMY ATTEMPTED, total abdominal hysterctomy with bilateral salpingo-oopherectomy, cystoscopy;  Surgeon: Santhosh aMy MD;  Location: [facility]     tubal ligation  1991            Social History   Substance Use Topics     Smoking status: Never Smoker     Smokeless tobacco: Never Used      Comment: No passive exposure     Alcohol use Yes      Comment: Occasionally            Family History   Problem Relation Age  "of Onset     Asthma Father      Other - See Comments Father      Factor V Leiden (Carrier)     Alzheimer Disease Mother      Lewie Body Dementia     Breast Cancer Mother      DIABETES Paternal Grandmother      DIABETES Maternal Grandmother      Other - See Comments Other      Niece, Factor V Leiden     Other - See Comments Daughter      Factor V Leiden     Other - See Comments Other      Nephew, Factor V Leiden, no family hx     Other - See Comments Sister      Factor V Leiden               Allergies   Allergen Reactions     Seasonal Allergies             Current Outpatient Prescriptions   Medication Sig Dispense Refill     loratadine (CLARITIN) 10 MG tablet Take 10 mg by mouth daily       enoxaparin (LOVENOX) 40 MG/0.4ML injection Inject 0.4 mLs (40 mg) Subcutaneous every 24 hours 12 mL 0          Review Of Systems  Constitutional:  Denies fever  GI/ negative except as noted per hpi    O: abdomen is soft with good bowel sounds.  Incision is intact with no signs of erythema or discharge.  All staples are  Removed.      /70  Pulse 88  Temp 98.8  F (37.1  C)  Ht 5' 8\" (1.727 m)  Wt 191 lb (86.6 kg)  LMP 07/12/2017  SpO2 98%  BMI 29.04 kg/m2           A:  Satisfactory postop course        Numbness across right leg as described above    P:  Continue with appropriate reduction of activity.          Followup with Dr. May at 6 weeks postpartum or when necessary  "

## 2017-09-01 NOTE — TELEPHONE ENCOUNTER
Patient is requesting a steroid due to having hive after surgery, was put on lovenox. Order pending   RAFAELA GIORDANO

## 2017-10-06 ENCOUNTER — OFFICE VISIT (OUTPATIENT)
Dept: OBGYN | Facility: OTHER | Age: 52
End: 2017-10-06
Attending: OBSTETRICS & GYNECOLOGY
Payer: COMMERCIAL

## 2017-10-06 VITALS
SYSTOLIC BLOOD PRESSURE: 120 MMHG | WEIGHT: 196 LBS | HEIGHT: 68 IN | HEART RATE: 66 BPM | DIASTOLIC BLOOD PRESSURE: 62 MMHG | TEMPERATURE: 97.1 F | BODY MASS INDEX: 29.7 KG/M2

## 2017-10-06 DIAGNOSIS — J02.0 STREPTOCOCCAL SORE THROAT: Primary | ICD-10-CM

## 2017-10-06 DIAGNOSIS — Z48.89 POSTOPERATIVE VISIT: ICD-10-CM

## 2017-10-06 PROBLEM — N80.03 ADENOMYOSIS: Status: ACTIVE | Noted: 2017-10-06

## 2017-10-06 LAB
DEPRECATED S PYO AG THROAT QL EIA: NORMAL
SPECIMEN SOURCE: NORMAL

## 2017-10-06 PROCEDURE — 99024 POSTOP FOLLOW-UP VISIT: CPT | Performed by: OBSTETRICS & GYNECOLOGY

## 2017-10-06 PROCEDURE — 87081 CULTURE SCREEN ONLY: CPT | Performed by: OBSTETRICS & GYNECOLOGY

## 2017-10-06 PROCEDURE — 87880 STREP A ASSAY W/OPTIC: CPT | Performed by: OBSTETRICS & GYNECOLOGY

## 2017-10-06 RX ORDER — ZOLPIDEM TARTRATE 5 MG/1
5 TABLET ORAL
COMMUNITY
End: 2018-02-05

## 2017-10-06 ASSESSMENT — PAIN SCALES - GENERAL: PAINLEVEL: MILD PAIN (2)

## 2017-10-06 NOTE — MR AVS SNAPSHOT
"              After Visit Summary   10/6/2017    Shweta Harris    MRN: [id number]           Patient Information     Date Of Birth          1965        Visit Information        Provider Department      10/6/2017 9:00 AM Santhosh May MD [facility]        Today's Diagnoses     Streptococcal sore throat    -  1    Postoperative visit           Follow-ups after your visit        Follow-up notes from your care team     Return in about 6 months (around 4/6/2018).      Who to contact     If you have questions or need follow up information about today's clinic visit or your schedule please contact [facility] directly at [phone].  Normal or non-critical lab and imaging results will be communicated to you by MyChart, letter or phone within 4 business days after the clinic has received the results. If you do not hear from us within 7 days, please contact the clinic through [employer] or phone. If you have a critical or abnormal lab result, we will notify you by phone as soon as possible.  Submit refill requests through [employer] or call your pharmacy and they will forward the refill request to us. Please allow 3 business days for your refill to be completed.          Additional Information About Your Visit        MyChart Information     [employer] gives you secure access to your electronic health record. If you see a primary care provider, you can also send messages to your care team and make appointments. If you have questions, please call your primary care clinic.  If you do not have a primary care provider, please call [phone] and they will assist you.        Care EveryWhere ID     This is your Care EveryWhere ID. This could be used by other organizations to access your [place] medical records  [id number]        Your Vitals Were     Pulse Temperature Height Last Period BMI (Body Mass Index)       66 97.1  F (36.2  C) 5' 8\" (1.727 m) 07/12/2017 29.8 kg/m2        Blood Pressure from " Last 3 Encounters:   10/06/17 120/62   08/31/17 110/70   08/27/17 131/83    Weight from Last 3 Encounters:   10/06/17 196 lb (88.9 kg)   08/31/17 191 lb (86.6 kg)   08/26/17 201 lb 11.5 oz (91.5 kg)              We Performed the Following     Beta strep group A culture     Strep, Rapid Screen        Primary Care Provider Office Phone # Fax #    DESTIN Zuleta [phone]       [facility] [address]  [place] [address]        Equal Access to Services     [facility]: Hadii aad ku hadasho Soomaali, waaxda luqadaha, qaybta kaalmada adeegyada, waxay idiin hayaan adeeg anastacio stephens . So [facility] [phone].    ATENCIÓN: Si habla español, tiene a boone disposición servicios gratuitos de asistencia lingüística. Llame al [phone].    We comply with applicable federal civil rights laws and [place] laws. We do not discriminate on the basis of race, color, national origin, age, disability, sex, sexual orientation, or gender identity.            Thank you!     Thank you for choosing [facility]  for your care. Our goal is always to provide you with excellent care. Hearing back from our patients is one way we can continue to improve our services. Please take a few minutes to complete the written survey that you may receive in the mail after your visit with us. Thank you!             Your Updated Medication List - Protect others around you: Learn how to safely use, store and throw away your medicines at www.disposemymeds.org.          This list is accurate as of: 10/6/17  9:44 AM.  Always use your most recent med list.                   Brand Name Dispense Instructions for use Diagnosis    AMBIEN 5 MG tablet   Generic drug:  zolpidem      Take 5 mg by mouth nightly as needed for sleep        loratadine 10 MG tablet    CLARITIN     Take 10 mg by mouth as needed

## 2017-10-06 NOTE — NURSING NOTE
"Chief Complaint   Patient presents with     Surgical Followup     [facility] 8/23/17       Initial /62  Pulse 66  Temp 97.1  F (36.2  C)  Ht 5' 8\" (1.727 m)  Wt 196 lb (88.9 kg)  LMP 07/12/2017  BMI 29.8 kg/m2 Estimated body mass index is 29.8 kg/(m^2) as calculated from the following:    Height as of this encounter: 5' 8\" (1.727 m).    Weight as of this encounter: 196 lb (88.9 kg).  Medication Reconciliation: complete   FRANCA GRAHAM      "

## 2017-10-06 NOTE — PROGRESS NOTES
S:    52 year old F Had laparoscopy converted to laparotomy and total abdominal hysterectomy and bilateral salpingo-oophorectomy on 8.23.17 by Dr. May for severe dysmenorrhoea. Enlarged uterus with adhesions of the ovaries and fallopian tubes and chocolate material found  Florid adenomyosis found on pathology with uterine fibroid.    Pfannenstiel incision which is still painful sometimes but getting better.    O:  Afebrile  97.1 F   196 lbs  Pulse 66  Bp 120/62    Abdomen soft not distended.   Pfannenstiel incision is  Healing well and not infected or swollen.    Ambulating well    A:  Doing well        Incisional discomfort        Some paraesthesia right lateral top of right leg probably neuropraxia    P:  For review in 6 months or earlier if having a problem           Note she has a Factor V coagulopathy and therefore  Not able to take estrogens as she has a tendency towards thrombosis.

## 2017-10-08 LAB
BACTERIA SPEC CULT: NORMAL
SPECIMEN SOURCE: NORMAL

## 2017-11-01 DIAGNOSIS — F51.01 PRIMARY INSOMNIA: Primary | ICD-10-CM

## 2017-11-01 NOTE — TELEPHONE ENCOUNTER
ambien      Last Written Prescription Date: 6/21/2017- medication dc from medlist   Last Fill Quantity: 30 ,  # refills: 0   Last Office Visit with [facility], [facility] or [facility] prescribing provider: 8/10/2017

## 2017-11-03 RX ORDER — ZOLPIDEM TARTRATE 6.25 MG/1
TABLET, FILM COATED, EXTENDED RELEASE ORAL
Qty: 30 TABLET | Refills: 0 | Status: SHIPPED | OUTPATIENT
Start: 2017-11-03 | End: 2017-12-04

## 2017-11-03 NOTE — TELEPHONE ENCOUNTER
Ambien      Future Office visit:   none    Routing refill request to provider for review/approval because:  Drug not active on patient's medication list

## 2017-12-04 DIAGNOSIS — F51.01 PRIMARY INSOMNIA: ICD-10-CM

## 2017-12-06 NOTE — TELEPHONE ENCOUNTER
ambien       Last Written Prescription Date: 11/03/2017  Last Fill Quantity: 30,  # refills: 0   Last Office Visit with [facility], [facility] or [facility] prescribing provider: 8/10/2017

## 2017-12-07 RX ORDER — ZOLPIDEM TARTRATE 6.25 MG/1
TABLET, FILM COATED, EXTENDED RELEASE ORAL
Qty: 30 TABLET | Refills: 0 | Status: SHIPPED | OUTPATIENT
Start: 2017-12-07 | End: 2018-01-10

## 2018-01-10 DIAGNOSIS — F51.01 PRIMARY INSOMNIA: ICD-10-CM

## 2018-01-12 RX ORDER — ZOLPIDEM TARTRATE 6.25 MG/1
TABLET, FILM COATED, EXTENDED RELEASE ORAL
Qty: 30 TABLET | Refills: 0 | Status: SHIPPED | OUTPATIENT
Start: 2018-01-12 | End: 2018-03-02

## 2018-02-05 ENCOUNTER — RADIANT APPOINTMENT (OUTPATIENT)
Dept: GENERAL RADIOLOGY | Facility: OTHER | Age: 53
End: 2018-02-05
Attending: PHYSICIAN ASSISTANT
Payer: COMMERCIAL

## 2018-02-05 ENCOUNTER — OFFICE VISIT (OUTPATIENT)
Dept: FAMILY MEDICINE | Facility: OTHER | Age: 53
End: 2018-02-05
Attending: PHYSICIAN ASSISTANT
Payer: COMMERCIAL

## 2018-02-05 VITALS
TEMPERATURE: 98.4 F | OXYGEN SATURATION: 99 % | DIASTOLIC BLOOD PRESSURE: 78 MMHG | WEIGHT: 200 LBS | BODY MASS INDEX: 30.41 KG/M2 | SYSTOLIC BLOOD PRESSURE: 110 MMHG | HEART RATE: 73 BPM

## 2018-02-05 DIAGNOSIS — R82.90 ABNORMAL URINE FINDINGS: ICD-10-CM

## 2018-02-05 DIAGNOSIS — R82.90 CLOUDY URINE: ICD-10-CM

## 2018-02-05 DIAGNOSIS — R74.8 ELEVATED LIPASE: ICD-10-CM

## 2018-02-05 DIAGNOSIS — R10.84 ABDOMINAL PAIN, GENERALIZED: Primary | ICD-10-CM

## 2018-02-05 DIAGNOSIS — R10.84 ABDOMINAL PAIN, GENERALIZED: ICD-10-CM

## 2018-02-05 LAB
ALBUMIN SERPL-MCNC: 3.8 G/DL (ref 3.4–5)
ALBUMIN UR-MCNC: NEGATIVE MG/DL
ALP SERPL-CCNC: 107 U/L (ref 40–150)
ALT SERPL W P-5'-P-CCNC: 47 U/L (ref 0–50)
AMYLASE SERPL-CCNC: 70 U/L (ref 30–110)
ANION GAP SERPL CALCULATED.3IONS-SCNC: 6 MMOL/L (ref 3–14)
APPEARANCE UR: CLEAR
AST SERPL W P-5'-P-CCNC: 24 U/L (ref 0–45)
BACTERIA #/AREA URNS HPF: ABNORMAL /HPF
BASOPHILS # BLD AUTO: 0.1 10E9/L (ref 0–0.2)
BASOPHILS NFR BLD AUTO: 1 %
BILIRUB SERPL-MCNC: 0.5 MG/DL (ref 0.2–1.3)
BILIRUB UR QL STRIP: NEGATIVE
BUN SERPL-MCNC: 19 MG/DL (ref 7–30)
CALCIUM SERPL-MCNC: 9.2 MG/DL (ref 8.5–10.1)
CHLORIDE SERPL-SCNC: 106 MMOL/L (ref 94–109)
CO2 SERPL-SCNC: 30 MMOL/L (ref 20–32)
COLOR UR AUTO: ABNORMAL
CREAT SERPL-MCNC: 0.82 MG/DL (ref 0.52–1.04)
CRP SERPL-MCNC: <2.9 MG/L (ref 0–8)
DIFFERENTIAL METHOD BLD: NORMAL
EOSINOPHIL # BLD AUTO: 0.2 10E9/L (ref 0–0.7)
EOSINOPHIL NFR BLD AUTO: 3.9 %
ERYTHROCYTE [DISTWIDTH] IN BLOOD BY AUTOMATED COUNT: 13.2 % (ref 10–15)
ERYTHROCYTE [SEDIMENTATION RATE] IN BLOOD BY WESTERGREN METHOD: 9 MM/H (ref 0–30)
GFR SERPL CREATININE-BSD FRML MDRD: 73 ML/MIN/1.7M2
GLUCOSE SERPL-MCNC: 101 MG/DL (ref 70–99)
GLUCOSE UR STRIP-MCNC: NEGATIVE MG/DL
HCT VFR BLD AUTO: 40.4 % (ref 35–47)
HGB BLD-MCNC: 13.9 G/DL (ref 11.7–15.7)
HGB UR QL STRIP: NEGATIVE
IMM GRANULOCYTES # BLD: 0 10E9/L (ref 0–0.4)
IMM GRANULOCYTES NFR BLD: 0 %
KETONES UR STRIP-MCNC: NEGATIVE MG/DL
LEUKOCYTE ESTERASE UR QL STRIP: ABNORMAL
LIPASE SERPL-CCNC: 409 U/L (ref 73–393)
LYMPHOCYTES # BLD AUTO: 1.8 10E9/L (ref 0.8–5.3)
LYMPHOCYTES NFR BLD AUTO: 30.2 %
MCH RBC QN AUTO: 29.1 PG (ref 26.5–33)
MCHC RBC AUTO-ENTMCNC: 34.4 G/DL (ref 31.5–36.5)
MCV RBC AUTO: 85 FL (ref 78–100)
MONOCYTES # BLD AUTO: 0.4 10E9/L (ref 0–1.3)
MONOCYTES NFR BLD AUTO: 6.7 %
NEUTROPHILS # BLD AUTO: 3.5 10E9/L (ref 1.6–8.3)
NEUTROPHILS NFR BLD AUTO: 58.2 %
NITRATE UR QL: NEGATIVE
NRBC # BLD AUTO: 0 10*3/UL
NRBC BLD AUTO-RTO: 0 /100
PH UR STRIP: 6.5 PH (ref 4.7–8)
PLATELET # BLD AUTO: 239 10E9/L (ref 150–450)
POTASSIUM SERPL-SCNC: 4.3 MMOL/L (ref 3.4–5.3)
PROT SERPL-MCNC: 7.4 G/DL (ref 6.8–8.8)
RBC # BLD AUTO: 4.78 10E12/L (ref 3.8–5.2)
RBC #/AREA URNS AUTO: 1 /HPF (ref 0–2)
SODIUM SERPL-SCNC: 142 MMOL/L (ref 133–144)
SOURCE: ABNORMAL
SP GR UR STRIP: 1.01 (ref 1–1.03)
SQUAMOUS #/AREA URNS AUTO: 1 /HPF (ref 0–1)
UROBILINOGEN UR STRIP-MCNC: NORMAL MG/DL (ref 0–2)
WBC # BLD AUTO: 6.1 10E9/L (ref 4–11)
WBC #/AREA URNS AUTO: 2 /HPF (ref 0–2)

## 2018-02-05 PROCEDURE — 83690 ASSAY OF LIPASE: CPT | Performed by: PHYSICIAN ASSISTANT

## 2018-02-05 PROCEDURE — 74019 RADEX ABDOMEN 2 VIEWS: CPT | Mod: TC

## 2018-02-05 PROCEDURE — 85652 RBC SED RATE AUTOMATED: CPT | Performed by: PHYSICIAN ASSISTANT

## 2018-02-05 PROCEDURE — 81001 URINALYSIS AUTO W/SCOPE: CPT | Performed by: PHYSICIAN ASSISTANT

## 2018-02-05 PROCEDURE — 85025 COMPLETE CBC W/AUTO DIFF WBC: CPT | Performed by: PHYSICIAN ASSISTANT

## 2018-02-05 PROCEDURE — 86140 C-REACTIVE PROTEIN: CPT | Performed by: PHYSICIAN ASSISTANT

## 2018-02-05 PROCEDURE — 82150 ASSAY OF AMYLASE: CPT | Performed by: PHYSICIAN ASSISTANT

## 2018-02-05 PROCEDURE — 80053 COMPREHEN METABOLIC PANEL: CPT | Performed by: PHYSICIAN ASSISTANT

## 2018-02-05 PROCEDURE — 99214 OFFICE O/P EST MOD 30 MIN: CPT | Performed by: PHYSICIAN ASSISTANT

## 2018-02-05 PROCEDURE — 36415 COLL VENOUS BLD VENIPUNCTURE: CPT | Performed by: PHYSICIAN ASSISTANT

## 2018-02-05 PROCEDURE — 87086 URINE CULTURE/COLONY COUNT: CPT | Performed by: PHYSICIAN ASSISTANT

## 2018-02-05 ASSESSMENT — ANXIETY QUESTIONNAIRES
GAD7 TOTAL SCORE: 0
1. FEELING NERVOUS, ANXIOUS, OR ON EDGE: NOT AT ALL
2. NOT BEING ABLE TO STOP OR CONTROL WORRYING: NOT AT ALL
6. BECOMING EASILY ANNOYED OR IRRITABLE: NOT AT ALL
3. WORRYING TOO MUCH ABOUT DIFFERENT THINGS: NOT AT ALL
5. BEING SO RESTLESS THAT IT IS HARD TO SIT STILL: NOT AT ALL
7. FEELING AFRAID AS IF SOMETHING AWFUL MIGHT HAPPEN: NOT AT ALL

## 2018-02-05 ASSESSMENT — PATIENT HEALTH QUESTIONNAIRE - PHQ9: 5. POOR APPETITE OR OVEREATING: NOT AT ALL

## 2018-02-05 ASSESSMENT — PAIN SCALES - GENERAL: PAINLEVEL: NO PAIN (0)

## 2018-02-05 NOTE — PROGRESS NOTES
SUBJECTIVE:   Shweta Harris is a 52 year old female who presents to clinic today for the following health issues:      Concern - 6 month Follow up after Surgery in August  Onset: 6 months ongoing    Description:   Having stomach pains and fevers every now and then.     Intensity: mild    Progression of Symptoms:  worsening    Accompanying Signs & Symptoms:  Abdominal pain, numbness in legs, and fevers.    Previous history of similar problem:   NONE    Precipitating factors:   Worsened by: Jeans with pressure    Alleviating factors:  Improved by: Time    Therapies Tried and outcome: none        Problem list and histories reviewed & adjusted, as indicated.  Additional history: as documented    Patient Active Problem List   Diagnosis     Heterozygous factor V Leiden mutation (H)     ACP (advance care planning)     Endometriosis of pelvis     Adenomyosis     Past Surgical History:   Procedure Laterality Date     COLONOSCOPY N/A 12/1/2014    Procedure: COLONOSCOPY;  Surgeon: Blanche Trivedi MD;  [facility]     CYSTOSCOPY N/A 8/23/2017    Procedure: CYSTOSCOPY;;  Surgeon: Santhosh May MD;  [facility]     GYN SURGERY       HYSTERECTOMY TOTAL ABDOMINAL  8/23/2017    Procedure: HYSTERECTOMY TOTAL ABDOMINAL;;  Surgeon: Santhosh May MD;  [facility]     LAPAROSCOPIC ASSISTED HYSTERECTOMY VAGINAL, BILATERAL SALPINGO-OOPHORECTOMY, COMBINED N/A 8/23/2017    Procedure: COMBINED LAPAROSCOPIC ASSISTED HYSTERECTOMY VAGINAL, SALPINGO-OOPHORECTOMY;  LAPAROSCOPIC ASSISTED VAGINAL HYSTERECTOMY, BILATERAL SALPINGO-OOPHORECTOMY ATTEMPTED, total abdominal hysterctomy with bilateral salpingo-oopherectomy, cystoscopy;  Surgeon: Santhosh May MD;  [facility]: [facility]     tubal ligation  1991       Social History   Substance Use Topics     Smoking status: Never Smoker     Smokeless tobacco: Never Used      Comment: No passive exposure     Alcohol use Yes      Comment: Occasionally     Family History   Problem Relation Age  of Onset     Asthma Father      Other - See Comments Father      Factor V Leiden (Carrier)     Heart Surgery Father 79     2 stents put in his heart.     Alzheimer Disease Mother      Lewie Body Dementia     Breast Cancer Mother      DIABETES Paternal Grandmother      DIABETES Maternal Grandmother      Other - See Comments Other      Niece, Factor V Leiden     Other - See Comments Daughter      Factor V Leiden     Other - See Comments Other      Nephew, Factor V Leiden, no family hx     Other - See Comments Sister      Factor V Leiden         Current Outpatient Prescriptions   Medication Sig Dispense Refill     zolpidem (AMBIEN CR) 6.25 MG CR tablet TAKE 1 TABLET BY MOUTH NIGHTLY AS NEEDED FOR SLEEP 30 tablet 0     loratadine (CLARITIN) 10 MG tablet Take 10 mg by mouth as needed        Allergies   Allergen Reactions     Seasonal Allergies      Recent Labs   Lab Test  08/23/17   1956  08/10/17   1038  06/21/17   1020  05/07/14   1115   LDL   --    --   94   --    HDL   --    --   67   --    TRIG   --    --   45   --    ALT   --    --   33  37   CR  0.84  0.84  0.78  0.87   GFRESTIMATED  70  71  77  69   GFRESTBLACK  85  86  >90   GFR Calc    83   POTASSIUM   --   4.5  3.9  4.4   TSH   --    --   2.31   --       BP Readings from Last 3 Encounters:   02/05/18 110/78   10/06/17 120/62   08/31/17 110/70    Wt Readings from Last 3 Encounters:   02/05/18 200 lb (90.7 kg)   10/06/17 196 lb (88.9 kg)   08/31/17 191 lb (86.6 kg)                    Reviewed and updated as needed this visit by clinical staff  Tobacco  Allergies  Meds  Med Hx  Surg Hx  Fam Hx  Soc Hx      Reviewed and updated as needed this visit by Provider         ROS:  Constitutional, neuro, ENT, endocrine, pulmonary, cardiac, gastrointestinal, genitourinary, musculoskeletal, integument and psychiatric systems are negative, except as otherwise noted.    OBJECTIVE:                                                    /78 (BP Location:  Left arm, Patient Position: Chair, Cuff Size: Adult Regular)  Pulse 73  Temp 98.4  F (36.9  C) (Tympanic)  Wt 200 lb (90.7 kg)  LMP 07/12/2017  SpO2 99%  BMI 30.41 kg/m2  Body mass index is 30.41 kg/(m^2).  GENERAL APPEARANCE: healthy, alert and no distress  EYES: Eyes grossly normal to inspection, PERRL and conjunctivae and sclerae normal  HENT: ear canals and TM's normal and nose and mouth without ulcers or lesions  NECK: no adenopathy, no asymmetry, masses, or scars and thyroid normal to palpation  RESP: lungs clear to auscultation - no rales, rhonchi or wheezes  CV: regular rates and rhythm, normal S1 S2, no S3 or S4 and no murmur, click or rub  LYMPHATICS: normal ant/post cervical and supraclavicular nodes  ABDOMEN: soft, nontender, without hepatosplenomegaly or masses and bowel sounds normal  MS: extremities normal- no gross deformities noted  SKIN: no suspicious lesions or rashes  NEURO: Normal strength and tone, mentation intact and speech normal  PSYCH: mentation appears normal and affect normal/bright    Diagnostic test results:  Diagnostic Test Results:  Results for orders placed or performed in visit on 02/05/18 (from the past 24 hour(s))   Comprehensive metabolic panel   Result Value Ref Range    Sodium 142 133 - 144 mmol/L    Potassium 4.3 3.4 - 5.3 mmol/L    Chloride 106 94 - 109 mmol/L    Carbon Dioxide 30 20 - 32 mmol/L    Anion Gap 6 3 - 14 mmol/L    Glucose 101 (H) 70 - 99 mg/dL    Urea Nitrogen 19 7 - 30 mg/dL    Creatinine 0.82 0.52 - 1.04 mg/dL    GFR Estimate 73 >60 mL/min/1.7m2    GFR Estimate If Black 88 >60 mL/min/1.7m2    Calcium 9.2 8.5 - 10.1 mg/dL    Bilirubin Total 0.5 0.2 - 1.3 mg/dL    Albumin 3.8 3.4 - 5.0 g/dL    Protein Total 7.4 6.8 - 8.8 g/dL    Alkaline Phosphatase 107 40 - 150 U/L    ALT 47 0 - 50 U/L    AST 24 0 - 45 U/L   CBC with platelets differential   Result Value Ref Range    WBC 6.1 4.0 - 11.0 10e9/L    RBC Count 4.78 3.8 - 5.2 10e12/L    Hemoglobin 13.9 11.7 -  15.7 g/dL    Hematocrit 40.4 35.0 - 47.0 %    MCV 85 78 - 100 fl    MCH 29.1 26.5 - 33.0 pg    MCHC 34.4 31.5 - 36.5 g/dL    RDW 13.2 10.0 - 15.0 %    Platelet Count 239 150 - 450 10e9/L    Diff Method Automated Method     % Neutrophils 58.2 %    % Lymphocytes 30.2 %    % Monocytes 6.7 %    % Eosinophils 3.9 %    % Basophils 1.0 %    % Immature Granulocytes 0.0 %    Nucleated RBCs 0 0 /100    Absolute Neutrophil 3.5 1.6 - 8.3 10e9/L    Absolute Lymphocytes 1.8 0.8 - 5.3 10e9/L    Absolute Monocytes 0.4 0.0 - 1.3 10e9/L    Absolute Eosinophils 0.2 0.0 - 0.7 10e9/L    Absolute Basophils 0.1 0.0 - 0.2 10e9/L    Abs Immature Granulocytes 0.0 0 - 0.4 10e9/L    Absolute Nucleated RBC 0.0    CRP, inflammation   Result Value Ref Range    CRP Inflammation <2.9 0.0 - 8.0 mg/L   Amylase   Result Value Ref Range    Amylase 70 30 - 110 U/L   Lipase   Result Value Ref Range    Lipase 409 (H) 73 - 393 U/L   ESR: Erythrocyte sedimentation rate   Result Value Ref Range    Sed Rate 9 0 - 30 mm/h   UA with Microscopic reflex to Culture   Result Value Ref Range    Color Urine Light Yellow     Appearance Urine Clear     Glucose Urine Negative NEG^Negative mg/dL    Bilirubin Urine Negative NEG^Negative    Ketones Urine Negative NEG^Negative mg/dL    Specific Gravity Urine 1.012 1.003 - 1.035    Blood Urine Negative NEG^Negative    pH Urine 6.5 4.7 - 8.0 pH    Protein Albumin Urine Negative NEG^Negative mg/dL    Urobilinogen mg/dL Normal 0.0 - 2.0 mg/dL    Nitrite Urine Negative NEG^Negative    Leukocyte Esterase Urine Moderate (A) NEG^Negative    Source Midstream Urine     WBC Urine 2 0 - 2 /HPF    RBC Urine 1 0 - 2 /HPF    Bacteria Urine None (A) NEG^Negative /HPF    Squamous Epithelial /HPF Urine 1 0 - 1 /HPF        ASSESSMENT/PLAN:                                                    1. Abdominal pain, generalized  Has been on and off for about 2 months. Getting chills and very fatigue.  She is feeling weak.  We will check ultrasound.   All other las look ok.  Will avoid all toxic things to liver and gallbladder.   - Comprehensive metabolic panel  - UA with Microscopic reflex to Culture  - CBC with platelets differential  - CRP, inflammation  - Amylase  - Lipase  - US Abdomen Limited; Future  - XR ABDOMEN 2 VW (Clinic Performed); Future  - ESR: Erythrocyte sedimentation rate    2. Cloudy urine  Culture is pending.     3. Abnormal urine findings  As above.   - Urine Culture Aerobic Bacterial    4. Elevated lipase  Checking ultrasound.  Make plans once we have this back.   - Comprehensive metabolic panel  - UA with Microscopic reflex to Culture  - CRP, inflammation  - US Abdomen Limited; Future  - XR ABDOMEN 2 VW (Clinic Performed); Future        See Patient Instructions    DESTIN [facility]  [facility]

## 2018-02-05 NOTE — NURSING NOTE
"Chief Complaint   Patient presents with     Surgical Followup     Fever and stomach pain following surgery.       Initial /78 (BP Location: Left arm, Patient Position: Chair, Cuff Size: Adult Regular)  Pulse 73  Temp 98.4  F (36.9  C) (Tympanic)  Wt 200 lb (90.7 kg)  LMP 07/12/2017  SpO2 99%  BMI 30.41 kg/m2 Estimated body mass index is 30.41 kg/(m^2) as calculated from the following:    Height as of 10/6/17: 5' 8\" (1.727 m).    Weight as of this encounter: 200 lb (90.7 kg).  Medication Reconciliation: complete   Gaye James LPN    "

## 2018-02-05 NOTE — MR AVS SNAPSHOT
After Visit Summary   2/5/2018    Shweta Harris    MRN: [id number]           Patient Information     Date Of Birth          1965        Visit Information        Provider Department      2/5/2018 8:00 AM Antionette Edward PA [facility] [place]        Today's Diagnoses     Abdominal pain, generalized    -  1    Cloudy urine        Abnormal urine findings        Elevated lipase          Care Instructions    Results for orders placed or performed in visit on 02/05/18 (from the past 24 hour(s))   Comprehensive metabolic panel   Result Value Ref Range    Sodium 142 133 - 144 mmol/L    Potassium 4.3 3.4 - 5.3 mmol/L    Chloride 106 94 - 109 mmol/L    Carbon Dioxide 30 20 - 32 mmol/L    Anion Gap 6 3 - 14 mmol/L    Glucose 101 (H) 70 - 99 mg/dL    Urea Nitrogen 19 7 - 30 mg/dL    Creatinine 0.82 0.52 - 1.04 mg/dL    GFR Estimate 73 >60 mL/min/1.7m2    GFR Estimate If Black 88 >60 mL/min/1.7m2    Calcium 9.2 8.5 - 10.1 mg/dL    Bilirubin Total 0.5 0.2 - 1.3 mg/dL    Albumin 3.8 3.4 - 5.0 g/dL    Protein Total 7.4 6.8 - 8.8 g/dL    Alkaline Phosphatase 107 40 - 150 U/L    ALT 47 0 - 50 U/L    AST 24 0 - 45 U/L   CBC with platelets differential   Result Value Ref Range    WBC 6.1 4.0 - 11.0 10e9/L    RBC Count 4.78 3.8 - 5.2 10e12/L    Hemoglobin 13.9 11.7 - 15.7 g/dL    Hematocrit 40.4 35.0 - 47.0 %    MCV 85 78 - 100 fl    MCH 29.1 26.5 - 33.0 pg    MCHC 34.4 31.5 - 36.5 g/dL    RDW 13.2 10.0 - 15.0 %    Platelet Count 239 150 - 450 10e9/L    Diff Method Automated Method     % Neutrophils 58.2 %    % Lymphocytes 30.2 %    % Monocytes 6.7 %    % Eosinophils 3.9 %    % Basophils 1.0 %    % Immature Granulocytes 0.0 %    Nucleated RBCs 0 0 /100    Absolute Neutrophil 3.5 1.6 - 8.3 10e9/L    Absolute Lymphocytes 1.8 0.8 - 5.3 10e9/L    Absolute Monocytes 0.4 0.0 - 1.3 10e9/L    Absolute Eosinophils 0.2 0.0 - 0.7 10e9/L    Absolute Basophils 0.1 0.0 - 0.2 10e9/L    Abs Immature Granulocytes 0.0 0 -  0.4 10e9/L    Absolute Nucleated RBC 0.0    CRP, inflammation   Result Value Ref Range    CRP Inflammation <2.9 0.0 - 8.0 mg/L   Amylase   Result Value Ref Range    Amylase 70 30 - 110 U/L   Lipase   Result Value Ref Range    Lipase 409 (H) 73 - 393 U/L   ESR: Erythrocyte sedimentation rate   Result Value Ref Range    Sed Rate 9 0 - 30 mm/h   UA with Microscopic reflex to Culture   Result Value Ref Range    Color Urine Light Yellow     Appearance Urine Clear     Glucose Urine Negative NEG^Negative mg/dL    Bilirubin Urine Negative NEG^Negative    Ketones Urine Negative NEG^Negative mg/dL    Specific Gravity Urine 1.012 1.003 - 1.035    Blood Urine Negative NEG^Negative    pH Urine 6.5 4.7 - 8.0 pH    Protein Albumin Urine Negative NEG^Negative mg/dL    Urobilinogen mg/dL Normal 0.0 - 2.0 mg/dL    Nitrite Urine Negative NEG^Negative    Leukocyte Esterase Urine Moderate (A) NEG^Negative    Source Midstream Urine     WBC Urine 2 0 - 2 /HPF    RBC Urine 1 0 - 2 /HPF    Bacteria Urine None (A) NEG^Negative /HPF    Squamous Epithelial /HPF Urine 1 0 - 1 /HPF               Follow-ups after your visit        Future tests that were ordered for you today     Open Future Orders        Priority Expected Expires Ordered    US Abdomen Limited Routine  2/5/2019 2/5/2018            Who to contact     If you have questions or need follow up information about today's clinic visit or your schedule please contact [facility] directly at [phone].  Normal or non-critical lab and imaging results will be communicated to you by [employer], letter or phone within 4 business days after the clinic has received the results. If you do not hear from us within 7 days, please contact the clinic through Pre[employer] or phone. If you have a critical or abnormal lab result, we will notify you by phone as soon as possible.  Submit refill requests through [email or web address] or call your pharmacy and they will forward the refill request to us. Please  allow 3 business days for your refill to be completed.          Additional Information About Your Visit        MyChart Information     [employer] gives you secure access to your electronic health record. If you see a primary care provider, you can also send messages to your care team and make appointments. If you have questions, please call your primary care clinic.  If you do not have a primary care provider, please call [phone] and they will assist you.        Care EveryWhere ID     This is your Care EveryWhere ID. This could be used by other organizations to access your [place] medical records  [id number]        Your Vitals Were     Pulse Temperature Last Period Pulse Oximetry BMI (Body Mass Index)       73 98.4  F (36.9  C) (Tympanic) 07/12/2017 99% 30.41 kg/m2        Blood Pressure from Last 3 Encounters:   02/05/18 110/78   10/06/17 120/62   08/31/17 110/70    Weight from Last 3 Encounters:   02/05/18 200 lb (90.7 kg)   10/06/17 196 lb (88.9 kg)   08/31/17 191 lb (86.6 kg)              We Performed the Following     Amylase     CBC with platelets differential     Comprehensive metabolic panel     CRP, inflammation     ESR: Erythrocyte sedimentation rate     Lipase     UA with Microscopic reflex to Culture     Urine Culture Aerobic Bacterial        Primary Care Provider Office Phone # Fax #    DESTIN Zuleta [phone]       [facility] [address] [place] [id number]        Equal Access to Services     JEANNE SOLIZ : Hadii aad ku hadasho Soomaali, waaxda luqadaha, qaybta kaalmada adeegyada, waxay perry stephens . So [facility] [phone].    ATENCIÓN: Si habla español, tiene a boone disposición servicios gratuitos de asistencia lingüística. Llame al [phone].    We comply with applicable federal civil rights laws and [place] laws. We do not discriminate on the basis of race, color, national origin, age, disability, sex, sexual orientation, or  gender identity.            Thank you!     Thank you for choosing [facility]  for your care. Our goal is always to provide you with excellent care. Hearing back from our patients is one way we can continue to improve our services. Please take a few minutes to complete the written survey that you may receive in the mail after your visit with us. Thank you!             Your Updated Medication List - Protect others around you: Learn how to safely use, store and throw away your medicines at www.disposemymeds.org.          This list is accurate as of 2/5/18 10:11 AM.  Always use your most recent med list.                   Brand Name Dispense Instructions for use Diagnosis    loratadine 10 MG tablet    CLARITIN     Take 10 mg by mouth as needed        zolpidem 6.25 MG CR tablet    AMBIEN CR    30 tablet    TAKE 1 TABLET BY MOUTH NIGHTLY AS NEEDED FOR SLEEP    Primary insomnia

## 2018-02-05 NOTE — PATIENT INSTRUCTIONS
Results for orders placed or performed in visit on 02/05/18 (from the past 24 hour(s))   Comprehensive metabolic panel   Result Value Ref Range    Sodium 142 133 - 144 mmol/L    Potassium 4.3 3.4 - 5.3 mmol/L    Chloride 106 94 - 109 mmol/L    Carbon Dioxide 30 20 - 32 mmol/L    Anion Gap 6 3 - 14 mmol/L    Glucose 101 (H) 70 - 99 mg/dL    Urea Nitrogen 19 7 - 30 mg/dL    Creatinine 0.82 0.52 - 1.04 mg/dL    GFR Estimate 73 >60 mL/min/1.7m2    GFR Estimate If Black 88 >60 mL/min/1.7m2    Calcium 9.2 8.5 - 10.1 mg/dL    Bilirubin Total 0.5 0.2 - 1.3 mg/dL    Albumin 3.8 3.4 - 5.0 g/dL    Protein Total 7.4 6.8 - 8.8 g/dL    Alkaline Phosphatase 107 40 - 150 U/L    ALT 47 0 - 50 U/L    AST 24 0 - 45 U/L   CBC with platelets differential   Result Value Ref Range    WBC 6.1 4.0 - 11.0 10e9/L    RBC Count 4.78 3.8 - 5.2 10e12/L    Hemoglobin 13.9 11.7 - 15.7 g/dL    Hematocrit 40.4 35.0 - 47.0 %    MCV 85 78 - 100 fl    MCH 29.1 26.5 - 33.0 pg    MCHC 34.4 31.5 - 36.5 g/dL    RDW 13.2 10.0 - 15.0 %    Platelet Count 239 150 - 450 10e9/L    Diff Method Automated Method     % Neutrophils 58.2 %    % Lymphocytes 30.2 %    % Monocytes 6.7 %    % Eosinophils 3.9 %    % Basophils 1.0 %    % Immature Granulocytes 0.0 %    Nucleated RBCs 0 0 /100    Absolute Neutrophil 3.5 1.6 - 8.3 10e9/L    Absolute Lymphocytes 1.8 0.8 - 5.3 10e9/L    Absolute Monocytes 0.4 0.0 - 1.3 10e9/L    Absolute Eosinophils 0.2 0.0 - 0.7 10e9/L    Absolute Basophils 0.1 0.0 - 0.2 10e9/L    Abs Immature Granulocytes 0.0 0 - 0.4 10e9/L    Absolute Nucleated RBC 0.0    CRP, inflammation   Result Value Ref Range    CRP Inflammation <2.9 0.0 - 8.0 mg/L   Amylase   Result Value Ref Range    Amylase 70 30 - 110 U/L   Lipase   Result Value Ref Range    Lipase 409 (H) 73 - 393 U/L   ESR: Erythrocyte sedimentation rate   Result Value Ref Range    Sed Rate 9 0 - 30 mm/h   UA with Microscopic reflex to Culture   Result Value Ref Range    Color Urine Light Yellow      Appearance Urine Clear     Glucose Urine Negative NEG^Negative mg/dL    Bilirubin Urine Negative NEG^Negative    Ketones Urine Negative NEG^Negative mg/dL    Specific Gravity Urine 1.012 1.003 - 1.035    Blood Urine Negative NEG^Negative    pH Urine 6.5 4.7 - 8.0 pH    Protein Albumin Urine Negative NEG^Negative mg/dL    Urobilinogen mg/dL Normal 0.0 - 2.0 mg/dL    Nitrite Urine Negative NEG^Negative    Leukocyte Esterase Urine Moderate (A) NEG^Negative    Source Midstream Urine     WBC Urine 2 0 - 2 /HPF    RBC Urine 1 0 - 2 /HPF    Bacteria Urine None (A) NEG^Negative /HPF    Squamous Epithelial /HPF Urine 1 0 - 1 /HPF

## 2018-02-06 ENCOUNTER — HOSPITAL ENCOUNTER (OUTPATIENT)
Dept: ULTRASOUND IMAGING | Facility: HOSPITAL | Age: 53
Discharge: HOME | End: 2018-02-06
Attending: PHYSICIAN ASSISTANT | Admitting: PHYSICIAN ASSISTANT
Payer: COMMERCIAL

## 2018-02-06 DIAGNOSIS — R74.8 ELEVATED LIPASE: ICD-10-CM

## 2018-02-06 DIAGNOSIS — R10.84 ABDOMINAL PAIN, GENERALIZED: ICD-10-CM

## 2018-02-06 PROCEDURE — 76705 ECHO EXAM OF ABDOMEN: CPT | Mod: TC

## 2018-02-06 ASSESSMENT — PATIENT HEALTH QUESTIONNAIRE - PHQ9: SUM OF ALL RESPONSES TO PHQ QUESTIONS 1-9: 1

## 2018-02-06 ASSESSMENT — ANXIETY QUESTIONNAIRES: GAD7 TOTAL SCORE: 0

## 2018-02-07 ENCOUNTER — TELEPHONE (OUTPATIENT)
Dept: FAMILY MEDICINE | Facility: OTHER | Age: 53
End: 2018-02-07

## 2018-02-07 DIAGNOSIS — R74.8 ELEVATED LIPASE: ICD-10-CM

## 2018-02-07 DIAGNOSIS — K82.4 GALLBLADDER POLYP: ICD-10-CM

## 2018-02-07 DIAGNOSIS — R10.11 ABDOMINAL PAIN, RIGHT UPPER QUADRANT: Primary | ICD-10-CM

## 2018-02-07 LAB
BACTERIA SPEC CULT: ABNORMAL
SPECIMEN SOURCE: ABNORMAL

## 2018-02-07 NOTE — TELEPHONE ENCOUNTER
Pt returned phone call.  Went over labs and referral to surgery/consult gallbladder.  Pt would like to discuss this with Antionette when she comes in on Monday for appt.  Luisa Sotomayor

## 2018-02-07 NOTE — TELEPHONE ENCOUNTER
Called pt with result and referral from [facility].  Referral to surgery for gallbladder.  Luisa Sotomayor

## 2018-02-11 ENCOUNTER — HOSPITAL ENCOUNTER (EMERGENCY)
Facility: HOSPITAL | Age: 53
Discharge: HOME | End: 2018-02-11
Attending: FAMILY MEDICINE | Admitting: FAMILY MEDICINE
Payer: COMMERCIAL

## 2018-02-11 ENCOUNTER — APPOINTMENT (OUTPATIENT)
Dept: CT IMAGING | Facility: HOSPITAL | Age: 53
End: 2018-02-11
Attending: FAMILY MEDICINE
Payer: COMMERCIAL

## 2018-02-11 VITALS
HEART RATE: 66 BPM | RESPIRATION RATE: 16 BRPM | OXYGEN SATURATION: 98 % | SYSTOLIC BLOOD PRESSURE: 119 MMHG | DIASTOLIC BLOOD PRESSURE: 79 MMHG | TEMPERATURE: 97.2 F

## 2018-02-11 DIAGNOSIS — G43.909 MIGRAINE WITHOUT STATUS MIGRAINOSUS, NOT INTRACTABLE, UNSPECIFIED MIGRAINE TYPE: ICD-10-CM

## 2018-02-11 LAB
ALBUMIN SERPL-MCNC: 3.9 G/DL (ref 3.4–5)
ALP SERPL-CCNC: 105 U/L (ref 40–150)
ALT SERPL W P-5'-P-CCNC: 46 U/L (ref 0–50)
ANION GAP SERPL CALCULATED.3IONS-SCNC: 7 MMOL/L (ref 3–14)
APTT PPP: 25 SEC (ref 24–37)
AST SERPL W P-5'-P-CCNC: 21 U/L (ref 0–45)
BASOPHILS # BLD AUTO: 0 10E9/L (ref 0–0.2)
BASOPHILS NFR BLD AUTO: 0.7 %
BILIRUB SERPL-MCNC: 0.4 MG/DL (ref 0.2–1.3)
BUN SERPL-MCNC: 14 MG/DL (ref 7–30)
CALCIUM SERPL-MCNC: 8.9 MG/DL (ref 8.5–10.1)
CHLORIDE SERPL-SCNC: 105 MMOL/L (ref 94–109)
CO2 SERPL-SCNC: 28 MMOL/L (ref 20–32)
CREAT SERPL-MCNC: 0.77 MG/DL (ref 0.52–1.04)
DIFFERENTIAL METHOD BLD: NORMAL
EOSINOPHIL # BLD AUTO: 0.1 10E9/L (ref 0–0.7)
EOSINOPHIL NFR BLD AUTO: 1.5 %
ERYTHROCYTE [DISTWIDTH] IN BLOOD BY AUTOMATED COUNT: 13 % (ref 10–15)
GFR SERPL CREATININE-BSD FRML MDRD: 78 ML/MIN/1.7M2
GLUCOSE BLDC GLUCOMTR-MCNC: 89 MG/DL (ref 70–99)
GLUCOSE SERPL-MCNC: 104 MG/DL (ref 70–99)
HCT VFR BLD AUTO: 41 % (ref 35–47)
HGB BLD-MCNC: 14.1 G/DL (ref 11.7–15.7)
IMM GRANULOCYTES # BLD: 0 10E9/L (ref 0–0.4)
IMM GRANULOCYTES NFR BLD: 0.3 %
INR PPP: 0.93 (ref 0.8–1.2)
LYMPHOCYTES # BLD AUTO: 1.2 10E9/L (ref 0.8–5.3)
LYMPHOCYTES NFR BLD AUTO: 19.8 %
MCH RBC QN AUTO: 29.1 PG (ref 26.5–33)
MCHC RBC AUTO-ENTMCNC: 34.4 G/DL (ref 31.5–36.5)
MCV RBC AUTO: 85 FL (ref 78–100)
MONOCYTES # BLD AUTO: 0.4 10E9/L (ref 0–1.3)
MONOCYTES NFR BLD AUTO: 6.2 %
NEUTROPHILS # BLD AUTO: 4.3 10E9/L (ref 1.6–8.3)
NEUTROPHILS NFR BLD AUTO: 71.5 %
NRBC # BLD AUTO: 0 10*3/UL
NRBC BLD AUTO-RTO: 0 /100
PLATELET # BLD AUTO: 232 10E9/L (ref 150–450)
POTASSIUM SERPL-SCNC: 4.1 MMOL/L (ref 3.4–5.3)
PROT SERPL-MCNC: 7.4 G/DL (ref 6.8–8.8)
RBC # BLD AUTO: 4.85 10E12/L (ref 3.8–5.2)
SODIUM SERPL-SCNC: 140 MMOL/L (ref 133–144)
TROPONIN I SERPL-MCNC: <0.015 UG/L (ref 0–0.04)
WBC # BLD AUTO: 6 10E9/L (ref 4–11)

## 2018-02-11 PROCEDURE — 00000146 ZZHCL STATISTIC GLUCOSE BY METER IP

## 2018-02-11 PROCEDURE — 93005 ELECTROCARDIOGRAM TRACING: CPT

## 2018-02-11 PROCEDURE — 70450 CT HEAD/BRAIN W/O DYE: CPT | Mod: TC

## 2018-02-11 PROCEDURE — 80053 COMPREHEN METABOLIC PANEL: CPT | Performed by: FAMILY MEDICINE

## 2018-02-11 PROCEDURE — 25000128 H RX IP 250 OP 636: Performed by: FAMILY MEDICINE

## 2018-02-11 PROCEDURE — 85025 COMPLETE CBC W/AUTO DIFF WBC: CPT | Performed by: FAMILY MEDICINE

## 2018-02-11 PROCEDURE — 99284 EMERGENCY DEPT VISIT MOD MDM: CPT | Performed by: FAMILY MEDICINE

## 2018-02-11 PROCEDURE — 96374 THER/PROPH/DIAG INJ IV PUSH: CPT

## 2018-02-11 PROCEDURE — 85730 THROMBOPLASTIN TIME PARTIAL: CPT | Performed by: FAMILY MEDICINE

## 2018-02-11 PROCEDURE — 36415 COLL VENOUS BLD VENIPUNCTURE: CPT | Performed by: FAMILY MEDICINE

## 2018-02-11 PROCEDURE — 85610 PROTHROMBIN TIME: CPT | Performed by: FAMILY MEDICINE

## 2018-02-11 PROCEDURE — 84484 ASSAY OF TROPONIN QUANT: CPT | Performed by: FAMILY MEDICINE

## 2018-02-11 PROCEDURE — 93010 ELECTROCARDIOGRAM REPORT: CPT | Performed by: INTERNAL MEDICINE

## 2018-02-11 PROCEDURE — 96361 HYDRATE IV INFUSION ADD-ON: CPT

## 2018-02-11 PROCEDURE — 99285 EMERGENCY DEPT VISIT HI MDM: CPT | Mod: 25

## 2018-02-11 RX ORDER — KETOROLAC TROMETHAMINE 30 MG/ML
30 INJECTION, SOLUTION INTRAMUSCULAR; INTRAVENOUS ONCE
Status: COMPLETED | OUTPATIENT
Start: 2018-02-11 | End: 2018-02-11

## 2018-02-11 RX ADMIN — SODIUM CHLORIDE 1000 ML: 9 INJECTION, SOLUTION INTRAVENOUS at 11:28

## 2018-02-11 RX ADMIN — KETOROLAC TROMETHAMINE 30 MG: 30 INJECTION, SOLUTION INTRAMUSCULAR at 13:14

## 2018-02-11 ASSESSMENT — ENCOUNTER SYMPTOMS
ACTIVITY CHANGE: 1
SPEECH DIFFICULTY: 0
DIAPHORESIS: 0
FATIGUE: 0
NUMBNESS: 0
SHORTNESS OF BREATH: 0
WEAKNESS: 0
DYSURIA: 0
MUSCULOSKELETAL NEGATIVE: 1
DIARRHEA: 0
FACIAL ASYMMETRY: 0
DIZZINESS: 0
NAUSEA: 0
VOMITING: 0
NERVOUS/ANXIOUS: 1
ABDOMINAL PAIN: 1
CONSTIPATION: 0

## 2018-02-11 NOTE — ED NOTES
Face to face report given with opportunity to observe patient.    Report given to BETTY Fitzgerald   2/11/2018  11:09 AM

## 2018-02-11 NOTE — ED AVS SNAPSHOT
[facility]    [address] [address]    Phone:  [phone]                                       Shweta Harris   MRN: [id number]    Department:  [facility]   Date of Visit:  2/11/2018           After Visit Summary Signature Page     I have received my discharge instructions, and my questions have been answered. I have discussed any challenges I see with this plan with the nurse or doctor.    ..........................................................................................................................................  Patient/Patient Representative Signature      ..........................................................................................................................................  Patient Representative Print Name and Relationship to Patient    ..................................................               ................................................  Date                                            Time    ..........................................................................................................................................  Reviewed by Signature/Title    ...................................................              ..............................................  Date                                                            Time

## 2018-02-11 NOTE — ED NOTES
"Boyfriend in room with pt. Pt in CT at this time. Boyfriend stated pt was \"normal\" around 0830 ths am and at 0940 is when he noticed her memory/confusion started. Boyfriend stated pt was upset with him yesterday \"and she was up all night crying\".   "

## 2018-02-11 NOTE — ED PROVIDER NOTES
History     Chief Complaint   Patient presents with     Altered Mental Status     HPI  Shweta Harris is a 52 year old female who arrived in the ER with increased confusion.  She apparently had a headache that was severe yesterday, it continued through the night and this morning when she woke she was having both headache and confusion.  It was enough that her significant other felt that she needed to be seen, she has never reacted this way to a headache before.  She has no lateralizing symptoms, has no signs of stroke, however the confusion was clearing at the time of arrival.  Cranial nerves are likewise intact.  Patient is having issues with her gallbladder, needs to have it removed, she and her significant other were concerned that the gallbladder may be part of the issue with this confusion.  Patient states she did not sleep last night because she was angry with her significant other for having gone on a fishing trip with alcohol involved.    Problem List:    Patient Active Problem List    Diagnosis Date Noted     Adenomyosis 10/06/2017     Priority: Medium     Endometriosis of pelvis 08/23/2017     Priority: Medium     ACP (advance care planning) 06/21/2017     Priority: Medium     Advance Care Planning 6/21/2017: ACP Review of Chart / Resources Provided:  Reviewed chart for advance care plan.  Shweta Harris has been provided information and resources to begin or update their advance care plan.  Added by Abril Peterson             Heterozygous factor V Leiden mutation (H) 10/09/2014     Priority: Medium        Past Medical History:    Past Medical History:   Diagnosis Date     Dermatitis fungal 6/30/2011     Factor V Leiden 6/28/2012     Family history of other blood disorders 6/30/2011     Heterozygous factor V Leiden mutation (H) 10/9/2014     Heterozygous factor V Leiden mutation (H)      Palpitations 9/22/2006       Past Surgical History:    Past Surgical History:   Procedure Laterality Date      COLONOSCOPY N/A 12/1/2014    Procedure: COLONOSCOPY;  Surgeon: Blanche Trivedi MD;  Location: [facility]     CYSTOSCOPY N/A 8/23/2017    Procedure: CYSTOSCOPY;;  Surgeon: Santhosh May MD;  Location: [facility]     GYN SURGERY       HYSTERECTOMY TOTAL ABDOMINAL  8/23/2017    Procedure: HYSTERECTOMY TOTAL ABDOMINAL;;  Surgeon: Santhosh May MD;  Location: [facility]     LAPAROSCOPIC ASSISTED HYSTERECTOMY VAGINAL, BILATERAL SALPINGO-OOPHORECTOMY, COMBINED N/A 8/23/2017    Procedure: COMBINED LAPAROSCOPIC ASSISTED HYSTERECTOMY VAGINAL, SALPINGO-OOPHORECTOMY;  LAPAROSCOPIC ASSISTED VAGINAL HYSTERECTOMY, BILATERAL SALPINGO-OOPHORECTOMY ATTEMPTED, total abdominal hysterctomy with bilateral salpingo-oopherectomy, cystoscopy;  Surgeon: Santhosh May MD;  Location: [facility]     tubal ligation  1991       Family History:    Family History   Problem Relation Age of Onset     Asthma Father      Other - See Comments Father      Factor V Leiden ([place])     Heart Surgery Father 79     2 stents put in his heart.     Alzheimer Disease Mother      Lewie Body Dementia     Breast Cancer Mother      DIABETES Paternal Grandmother      DIABETES Maternal Grandmother      Other - See Comments Other      Niece, Factor V Leiden     Other - See Comments Daughter      Factor V Leiden     Other - See Comments Other      Nephew, Factor V Leiden, no family hx     Other - See Comments Sister      Factor V Leiden       Social History:  Marital Status:   [4]  Social History   Substance Use Topics     Smoking status: Never Smoker     Smokeless tobacco: Never Used      Comment: No passive exposure     Alcohol use Yes      Comment: Occasionally        Medications:      zolpidem (AMBIEN CR) 6.25 MG CR tablet   loratadine (CLARITIN) 10 MG tablet         Review of Systems   Constitutional: Positive for activity change. Negative for diaphoresis and fatigue.   HENT: Negative.    Respiratory: Negative for shortness of breath.    Cardiovascular: Negative for  chest pain.   Gastrointestinal: Positive for abdominal pain. Negative for constipation, diarrhea, nausea and vomiting.        Minimal, has not eaten yet today.   Genitourinary: Negative for dysuria.   Musculoskeletal: Negative.    Skin: Negative.    Neurological: Negative for dizziness, facial asymmetry, speech difficulty, weakness and numbness.   Psychiatric/Behavioral: The patient is nervous/anxious.        Physical Exam   BP: 151/81  Pulse: 66  Heart Rate: 67  Temp: 97.9  F (36.6  C)  Resp: 18  SpO2: 99 %      Physical Exam   Constitutional: She is oriented to person, place, and time. She appears well-developed and well-nourished. No distress.   HENT:   Head: Normocephalic and atraumatic.   Neck: Normal range of motion. Neck supple.   Cardiovascular: Normal rate, regular rhythm and normal heart sounds.    No murmur heard.  Pulmonary/Chest: Effort normal and breath sounds normal. No respiratory distress.   Abdominal: Soft. Bowel sounds are normal. She exhibits no distension. There is no tenderness.   Musculoskeletal: Normal range of motion.   Neurological: She is alert and oriented to person, place, and time.   Confusion clearing.   Skin: Skin is warm and dry.   Psychiatric: Her mood appears anxious. Cognition and memory are impaired.   Nursing note and vitals reviewed.      ED Course     ED Course     Procedures               EKG Interpretation:      Interpreted by Anay Gee  Time reviewed: 1140  Symptoms at time of EKG: confusion   Rhythm: normal sinus with sinus arrhythmia  Rate: normal  Axis: normal  Ectopy: none  Conduction: normal  ST Segments/ T Waves: No ST-T wave changes  Q Waves: none  Comparison to prior: Unchanged from 5/2014    Clinical Impression: Mildly abnormal EKG    Labs Ordered and Resulted from Time of ED Arrival Up to the Time of Departure from the ED   COMPREHENSIVE METABOLIC PANEL - Abnormal; Notable for the following:        Result Value    Glucose 104 (*)     All other  components within normal limits   CBC WITH PLATELETS DIFFERENTIAL   INR   PARTIAL THROMBOPLASTIN TIME   TROPONIN I   GLUCOSE BY METER   VITAL SIGNS AND NEURO CHECKS   ACTIVITY   PULSE OXIMETRY NURSING   GLUCOSE MONITOR NURSING POCT       Assessments & Plan (with Medical Decision Making)   Patient's mental confusion clearing over time in the ER.  Labs are completely normal, CT is likewise completely normal.  Spoke with patient and her significant other and daughter, this has no relationship to her gallbladder disease at this time she has a normal white count and normal liver function tests.  There is no sign of stroke.  Troponin is negative as well.  This is most likely complex migraine with confusional state caused by prolonged pain.  Patient was given Toradol in the emergency room and sent home to rest.  If further symptoms develop she can return to the emergency room or see her primary care tomorrow.  Mild     I have reviewed the nursing notes.    I have reviewed the findings, diagnosis, plan and need for follow up with the patient.    New Prescriptions    No medications on file       Final diagnoses:   Migraine without status migrainosus, not intractable, unspecified migraine type - complex migraine with confusion       2/11/2018   [facility]     Anay Cox MD  02/11/18 [id number]

## 2018-02-11 NOTE — DISCHARGE INSTRUCTIONS
* Migraine Headache  Migraine headaches are related to changes in blood flow to the brain. This causes throbbing or constant pain on one or both sides of the head. The pain may last from a few hours to several days. There is usually nausea, vomiting, sensitivity to light and sound, and blurred vision. A migraine attack may be triggered by emotional stress, hormone changes during the menstrual cycle, oral contraceptives, alcohol use, certain foods containing tyramine, eye strain, weather changes, missing meals, or too little or too much sleep.  Home Care For This Headache:  1) If you were given pain medicine for this headache, do not drive yourself home . Arrange for a ride, instead. When you get home, try to sleep. You should feel much better when you wake up.  2) Migraine headaches may improve with an ice pack on the forehead or at the base of the skull. Heat to the back of your neck may relieve any neck spasm.  3) Drink only clear liquids or eat a very light diet to avoid nausea/vomiting until symptoms improve.  Preventing Future Headaches:  1) Pay attention to those factors that seem to trigger your headache. Try to avoid them when you can. If you have frequent headaches, it is useful to keep a diary of what you were doing, feeling or eating in the hours before each attack. Show this to your doctor to help find the cause of your headaches.  a) If you feel that stress is a factor in your headaches, look at the sources of stress in your life. Find ways to release the build-up of those stresses by using regular exercise, relaxation methods (yoga, meditation), bio-feedback or simply taking time-out for yourself. For more information about this, consult your doctor or go to a local bookstore and review books and tapes on this subject.  b) Tyramine is a substance present in the following foods : chocolate, yogurt, all cheeses except cottage cheese and cream cheese. smoked or pickled fish and meat (including herring,  caviar, bologna, pepperoni, salami), liver, avocados, bananas, figs, raisins, and red wine. Be aware that these foods may trigger a migraine in some persons. Try taking these foods out of your diet for 1-2 months to see if this reduces headache frequency.  Treating Future Attacks:  1) At the first sign of a migraine headache, take a medicine to stop it if one has been prescribed for you. If not, take acetaminophen (Tylenol) or ibuprofen (Motrin, Advil) if you are able to take these. The sooner you take medicine, the better it will work.  2) You may also want to find a quiet, dark, comfortable place to sit or lie down. Let yourself relax or sleep.  3) An ice pack on the forehead or area of greatest pain may also help.   Follow Up  with your doctor if the headache is not better within the next 24 hours. If you have frequent headaches you should discuss a treatment plan with your primary care doctor. Ask if you can have medicine to take at home the next time you get a bad headache. Poorly controlled chronic headaches may require a referral to a neurologist (headache specialist).  Get Prompt Medical Attention  if any of the following occur:    Your head pain gets worse, or does not improve within 24 hours    Repeated vomiting (can t keep liquids down)    Sinus or ear or throat pain (not already reported)    Fever of 101  F (38.3  C) or higher, or as directed by your healthcare provider    Stiff neck    Extreme drowsiness, confusion or fainting    Weakness of an arm or leg or one side of the face    Difficulty with speech or vision    5833-8681 [employer]. [address] [place] [address], [place] [address]. All rights reserved. This information is not intended as a substitute for professional medical care. Always follow your healthcare professional's instructions.  This information has been modified by your health care provider with permission from the publisher.

## 2018-02-11 NOTE — ED AVS SNAPSHOT
[facility]    [address] [place] [address]    [place] [address]    Phone:  [phone]                                       Shweta Harris   MRN: [id number]    Department:  [facility]   Date of Visit:  2/11/2018           Patient Information     Date Of Birth          1965        Your diagnoses for this visit were:     Migraine without status migrainosus, not intractable, unspecified migraine type complex migraine with confusion       You were seen by Anay Cox MD.      Follow-up Information     Follow up with Antionette Edward PA In 3 days.    Specialty:  Family Practice    Why:  As needed, If symptoms worsen, or fail to improve    Contact information:    [facility]  [address]  [facility] [address]  [phone]          Discharge Instructions         * Migraine Headache  Migraine headaches are related to changes in blood flow to the brain. This causes throbbing or constant pain on one or both sides of the head. The pain may last from a few hours to several days. There is usually nausea, vomiting, sensitivity to light and sound, and blurred vision. A migraine attack may be triggered by emotional stress, hormone changes during the menstrual cycle, oral contraceptives, alcohol use, certain foods containing tyramine, eye strain, weather changes, missing meals, or too little or too much sleep.  Home Care For This Headache:  1) If you were given pain medicine for this headache, do not drive yourself home . Arrange for a ride, instead. When you get home, try to sleep. You should feel much better when you wake up.  2) Migraine headaches may improve with an ice pack on the forehead or at the base of the skull. Heat to the back of your neck may relieve any neck spasm.  3) Drink only clear liquids or eat a very light diet to avoid nausea/vomiting until symptoms improve.  Preventing Future Headaches:  1) Pay attention to those factors that seem to  trigger your headache. Try to avoid them when you can. If you have frequent headaches, it is useful to keep a diary of what you were doing, feeling or eating in the hours before each attack. Show this to your doctor to help find the cause of your headaches.  a) If you feel that stress is a factor in your headaches, look at the sources of stress in your life. Find ways to release the build-up of those stresses by using regular exercise, relaxation methods (yoga, meditation), bio-feedback or simply taking time-out for yourself. For more information about this, consult your doctor or go to a local bookstore and review books and tapes on this subject.  b) Tyramine is a substance present in the following foods : chocolate, yogurt, all cheeses except cottage cheese and cream cheese. smoked or pickled fish and meat (including herring, caviar, bologna, pepperoni, salami), liver, avocados, bananas, figs, raisins, and red wine. Be aware that these foods may trigger a migraine in some persons. Try taking these foods out of your diet for 1-2 months to see if this reduces headache frequency.  Treating Future Attacks:  1) At the first sign of a migraine headache, take a medicine to stop it if one has been prescribed for you. If not, take acetaminophen (Tylenol) or ibuprofen (Motrin, Advil) if you are able to take these. The sooner you take medicine, the better it will work.  2) You may also want to find a quiet, dark, comfortable place to sit or lie down. Let yourself relax or sleep.  3) An ice pack on the forehead or area of greatest pain may also help.   Follow Up  with your doctor if the headache is not better within the next 24 hours. If you have frequent headaches you should discuss a treatment plan with your primary care doctor. Ask if you can have medicine to take at home the next time you get a bad headache. Poorly controlled chronic headaches may require a referral to a neurologist (headache specialist).  Get Prompt  Medical Attention  if any of the following occur:    Your head pain gets worse, or does not improve within 24 hours    Repeated vomiting (can t keep liquids down)    Sinus or ear or throat pain (not already reported)    Fever of 101  F (38.3  C) or higher, or as directed by your healthcare provider    Stiff neck    Extreme drowsiness, confusion or fainting    Weakness of an arm or leg or one side of the face    Difficulty with speech or vision    0735-0651 [employer]. [address] [place] [address]. All rights reserved. This information is not intended as a substitute for professional medical care. Always follow your healthcare professional's instructions.  This information has been modified by your health care provider with permission from the publisher.          Future Appointments        Provider Department Dept Phone Center    2/12/2018 8:30 AM DESTIN [facility] [place] [phone] [place]    2/21/2018 3:00 PM Lorenzo Berry MD [facility] [phone] [facility]         Review of your medicines      Our records show that you are taking the medicines listed below. If these are incorrect, please call your family doctor or clinic.        Dose / Directions Last dose taken    loratadine 10 MG tablet   Commonly known as:  CLARITIN   Dose:  10 mg   Indication:  Hayfever        Take 10 mg by mouth as needed   Refills:  0        zolpidem 6.25 MG CR tablet   Commonly known as:  AMBIEN CR   Quantity:  30 tablet        TAKE 1 TABLET BY MOUTH NIGHTLY AS NEEDED FOR SLEEP   Refills:  0                Procedures and tests performed during your visit     Activity: Bedrest    CBC with platelets differential    CT Head w/o Contrast    Comprehensive metabolic panel    EKG 12-lead, tracing only    Glucose by meter    Glucose monitor nursing POCT    INR    Partial thromboplastin time    Pulse oximetry nursing    Troponin I    Vital signs and neuro checks       Orders Needing Specimen Collection     None      Pending Results     No orders found from 2/9/2018 to 2/12/2018.            Pending Culture Results     No orders found from 2/9/2018 to 2/12/2018.            Thank you for choosing [facility]       Thank you for choosing [facility] for your care. Our goal is always to provide you with excellent care. Hearing back from our patients is one way we can continue to improve our services. Please take a few minutes to complete the written survey that you may receive in the mail after you visit with us. Thank you!        [email or web address] Information     [employer] gives you secure access to your electronic health record. If you see a primary care provider, you can also send messages to your care team and make appointments. If you have questions, please call your primary care clinic.  If you do not have a primary care provider, please call [phone] and they will assist you.        Care EveryWhere ID     This is your Care EveryWhere ID. This could be used by other organizations to access your [facility] medical records  [id number]        Equal Access to Services     JEANNE SOLIZ : Hadii merritt Gee, waaxda sangeeta, qaybta kaalmavidya caldera, ángela stephens . So [facility] [phone].    ATENCIÓN: Si habla español, tiene a boone disposición servicios gratuitos de asistencia lingüística. Llame al [phone].    We comply with applicable federal civil rights laws and [place] laws. We do not discriminate on the basis of race, color, national origin, age, disability, sex, sexual orientation, or gender identity.            After Visit Summary       This is your record. Keep this with you and show to your community pharmacist(s) and doctor(s) at your next visit.

## 2018-02-12 ENCOUNTER — OFFICE VISIT (OUTPATIENT)
Dept: FAMILY MEDICINE | Facility: OTHER | Age: 53
End: 2018-02-12
Attending: PHYSICIAN ASSISTANT
Payer: COMMERCIAL

## 2018-02-12 VITALS
HEART RATE: 77 BPM | TEMPERATURE: 98.5 F | SYSTOLIC BLOOD PRESSURE: 112 MMHG | OXYGEN SATURATION: 99 % | BODY MASS INDEX: 31.52 KG/M2 | DIASTOLIC BLOOD PRESSURE: 78 MMHG | HEIGHT: 68 IN | WEIGHT: 208 LBS

## 2018-02-12 DIAGNOSIS — F43.0 STRESS REACTION: ICD-10-CM

## 2018-02-12 DIAGNOSIS — M62.838 CERVICAL PARASPINAL MUSCLE SPASM: Primary | ICD-10-CM

## 2018-02-12 PROCEDURE — 99214 OFFICE O/P EST MOD 30 MIN: CPT | Performed by: PHYSICIAN ASSISTANT

## 2018-02-12 RX ORDER — CYCLOBENZAPRINE HCL 5 MG
5 TABLET ORAL 3 TIMES DAILY PRN
Qty: 42 TABLET | Refills: 1 | Status: SHIPPED | OUTPATIENT
Start: 2018-02-12 | End: 2018-07-12

## 2018-02-12 RX ORDER — ESCITALOPRAM OXALATE 10 MG/1
10 TABLET ORAL DAILY
Qty: 30 TABLET | Refills: 1 | Status: SHIPPED | OUTPATIENT
Start: 2018-02-12 | End: 2018-07-12

## 2018-02-12 ASSESSMENT — ANXIETY QUESTIONNAIRES
7. FEELING AFRAID AS IF SOMETHING AWFUL MIGHT HAPPEN: NOT AT ALL
6. BECOMING EASILY ANNOYED OR IRRITABLE: NOT AT ALL
2. NOT BEING ABLE TO STOP OR CONTROL WORRYING: NOT AT ALL
1. FEELING NERVOUS, ANXIOUS, OR ON EDGE: NOT AT ALL
5. BEING SO RESTLESS THAT IT IS HARD TO SIT STILL: NOT AT ALL
IF YOU CHECKED OFF ANY PROBLEMS ON THIS QUESTIONNAIRE, HOW DIFFICULT HAVE THESE PROBLEMS MADE IT FOR YOU TO DO YOUR WORK, TAKE CARE OF THINGS AT HOME, OR GET ALONG WITH OTHER PEOPLE: NOT DIFFICULT AT ALL
3. WORRYING TOO MUCH ABOUT DIFFERENT THINGS: NOT AT ALL
GAD7 TOTAL SCORE: 1
4. TROUBLE RELAXING: SEVERAL DAYS

## 2018-02-12 ASSESSMENT — PAIN SCALES - GENERAL: PAINLEVEL: MILD PAIN (3)

## 2018-02-12 NOTE — MR AVS SNAPSHOT
After Visit Summary   2/12/2018    Shweta Harris    MRN: [id number]           Patient Information     Date Of Birth          1965        Visit Information        Provider Department      2/12/2018 8:30 AM Antionette Edward PA [facility]        Today's Diagnoses     Cervical paraspinal muscle spasm    -  1    Stress reaction          Care Instructions      Thank you for choosing [facility].   I have office hours 8:00 am to 4:30 pm on Monday's, Wednesday's, Thursday's and Friday's. My nurse and I are out of the office every Tuesday.    Following your visit, when your labs and diagnostic testing have returned, I will review then and you will be contacted by my nurse.  If you are on My Chart, you can also view results there.    For refills, notify your pharmacy regarding what you need and the pharmacy will generate a refill request. Do not call my nurse as she is unable to process refill request. Please plan ahead and allow 3-5 days for refill requests.    You will generally receive a reminder call the day prior to your appointment.  If you cannot attend your appointment, please cancel your appointment with as much notice as possible.  If there is a pattern of failure to present for your appointments, I cannot provide consistent, meaningful, ongoing care for you. It is very important to me that you come in for your care, so we can best assist you with your health care needs.    IMPORTANT:  Please note that it is my standard of practice to NOT participate in prescribing ongoing requested Narcotic Analgesic therapy, and/or participate in the prescribing of other controlled substances.  My nurse and I am happy to assist you with the process of referral for alternative pain management as needed, and other treatment modalities including but not limited to:  Physical Therapy, Physical Medicine and Rehab, Counseling, Chiropractic Care, Orthopedic Care, and non-narcotic  "medication management.     In the event that you need to be seen for emergent concerns and I am out of office,  please see one of my colleagues for acute concerns.  You may also present to UC or ER.  I appreciate the opportunity to serve you and look forward to supporting your healthcare needs in the future. Please contact me with any questions or concerns that you may have.    Sincerely,      Antionette Edward RN, PA-C               Follow-ups after your visit        Additional Services     PHYSICAL THERAPY REFERRAL       *This therapy referral will be filtered to a centralized scheduling office at [facility] and the patient will receive a call to schedule an appointment at a [facility] location most convenient for them. *     [facility] provides Physical Therapy evaluation and treatment and many specialty services across the [facility] system.  If requesting a specialty program, please choose from the list below.    If you have not heard from the scheduling office within 2 business days, please call [phone] for all locations, with the exception of [place], please call [phone].  Treatment: Evaluation & Treatment  Special Instructions/Modalities: neck pain and transformed migraines from this.   Special Programs:     Please be aware that coverage of these services is subject to the terms and limitations of your health insurance plan.  Call member services at your health plan with any benefit or coverage questions.      **Note to Provider:  If you are referring outside of [facility] for the therapy appointment, please list the name of the location in the \"special instructions\" above, print the referral and give to the patient to schedule the appointment.            PSYCHOLOGY REFERRAL       Your provider has referred you to:  [facility]    Please be aware that coverage of these services is subject to the terms and limitations of your health insurance plan.  Call member " services at your health plan with any benefit or coverage questions.      Please bring the following to your appointment:    >>   Any x-rays, CTs or MRIs which have been performed.  Contact the facility where they were done to arrange for  prior to your scheduled appointment.   >>   List of current medications   >>   This referral request   >>   Any documents/labs given to you for this referral                  Your next 10 appointments already scheduled     Feb 21, 2018  3:00 PM CST   (Arrive by 2:45 PM)   New Visit with Lorenzo Berry MD   [facility] ([facility] )    [address]  [facility] [address]   [phone]              Who to contact     If you have questions or need follow up information about today's clinic visit or your schedule please contact [facility] directly at [phone].  Normal or non-critical lab and imaging results will be communicated to you by MyChart, letter or phone within 4 business days after the clinic has received the results. If you do not hear from us within 7 days, please contact the clinic through MyChart or phone. If you have a critical or abnormal lab result, we will notify you by phone as soon as possible.  Submit refill requests through [employer] or call your pharmacy and they will forward the refill request to us. Please allow 3 business days for your refill to be completed.          Additional Information About Your Visit        MyChart Information     [employer] gives you secure access to your electronic health record. If you see a primary care provider, you can also send messages to your care team and make appointments. If you have questions, please call your primary care clinic.  If you do not have a primary care provider, please call [phone] and they will assist you.        Care EveryWhere ID     This is your Care EveryWhere ID. This could be used by other organizations to access your [facility]  "records  [id number]        Your Vitals Were     Pulse Temperature Height Last Period Pulse Oximetry BMI (Body Mass Index)    77 98.5  F (36.9  C) (Tympanic) 5' 8\" (1.727 m) 07/12/2017 99% 31.63 kg/m2       Blood Pressure from Last 3 Encounters:   02/12/18 112/78   02/11/18 119/79   02/05/18 110/78    Weight from Last 3 Encounters:   02/12/18 208 lb (94.3 kg)   02/05/18 200 lb (90.7 kg)   10/06/17 196 lb (88.9 kg)              We Performed the Following     PHYSICAL THERAPY REFERRAL     PSYCHOLOGY REFERRAL          Today's Medication Changes          These changes are accurate as of 2/12/18  9:09 AM.  If you have any questions, ask your nurse or doctor.               Start taking these medicines.        Dose/Directions    cyclobenzaprine 5 MG tablet   Commonly known as:  FLEXERIL   Used for:  Cervical paraspinal muscle spasm   Started by:  Antionette Edward PA        Dose:  5 mg   Take 1 tablet (5 mg) by mouth 3 times daily as needed for muscle spasms   Quantity:  42 tablet   Refills:  1       escitalopram 10 MG tablet   Commonly known as:  LEXAPRO   Used for:  Cervical paraspinal muscle spasm   Started by:  Antionette Edward PA        Dose:  10 mg   Take 1 tablet (10 mg) by mouth daily   Quantity:  30 tablet   Refills:  1            Where to get your medicines      These medications were sent to [facility] - [address]  [address] [place] [address]     Phone:  [phone]     cyclobenzaprine 5 MG tablet    escitalopram 10 MG tablet                Primary Care Provider Office Phone # Fax #    DESTIN Zuleta [phone]       [facility] [address]  [place] [address]        Equal Access to Services     [facility] MARTÍNEZ AH: Basil Gee, waaxda luqadaha, qaybta kaalmada adeegyada, ángela gill. So [facility] [phone].    ATENCIÓN: Si monsela español, tiene a boone disposición servicios gratuitos de " asistencia lingüística. Olaf al [phone].    We comply with applicable federal civil rights laws and [place] laws. We do not discriminate on the basis of race, color, national origin, age, disability, sex, sexual orientation, or gender identity.            Thank you!     Thank you for choosing [facility]  for your care. Our goal is always to provide you with excellent care. Hearing back from our patients is one way we can continue to improve our services. Please take a few minutes to complete the written survey that you may receive in the mail after your visit with us. Thank you!             Your Updated Medication List - Protect others around you: Learn how to safely use, store and throw away your medicines at www.disposemymeds.org.          This list is accurate as of 2/12/18  9:09 AM.  Always use your most recent med list.                   Brand Name Dispense Instructions for use Diagnosis    cyclobenzaprine 5 MG tablet    FLEXERIL    42 tablet    Take 1 tablet (5 mg) by mouth 3 times daily as needed for muscle spasms    Cervical paraspinal muscle spasm       escitalopram 10 MG tablet    LEXAPRO    30 tablet    Take 1 tablet (10 mg) by mouth daily    Cervical paraspinal muscle spasm       loratadine 10 MG tablet    CLARITIN     Take 10 mg by mouth as needed        zolpidem 6.25 MG CR tablet    AMBIEN CR    30 tablet    TAKE 1 TABLET BY MOUTH NIGHTLY AS NEEDED FOR SLEEP    Primary insomnia

## 2018-02-12 NOTE — PROGRESS NOTES
SUBJECTIVE:   Shweta Harris is a 52 year old female who presents to clinic today for the following health issues:      ED/UC Followup:    Facility:  [facility]  Date of visit: 2/11  Reason for visit: episode of confusion  Current Status: normal     Yesterday out of the blue, pt had an episode of confusion-doesn't recall/remember entire period of time.  Stress- but doesn't feel it.  Headache, migraine.  Had labs and head CT.          Problem list and histories reviewed & adjusted, as indicated.  Additional history: as documented    Patient Active Problem List   Diagnosis     Heterozygous factor V Leiden mutation (H)     ACP (advance care planning)     Endometriosis of pelvis     Adenomyosis     Past Surgical History:   Procedure Laterality Date     COLONOSCOPY N/A 12/1/2014    Procedure: COLONOSCOPY;  Surgeon: Blanche Trivedi MD;  Location: [facility]     CYSTOSCOPY N/A 8/23/2017    Procedure: CYSTOSCOPY;;  Surgeon: Santhosh May MD;  Location: [facility]     GYN SURGERY       HYSTERECTOMY TOTAL ABDOMINAL  8/23/2017    Procedure: HYSTERECTOMY TOTAL ABDOMINAL;;  Surgeon: Santhosh May MD;  Location: [facility]     LAPAROSCOPIC ASSISTED HYSTERECTOMY VAGINAL, BILATERAL SALPINGO-OOPHORECTOMY, COMBINED N/A 8/23/2017    Procedure: COMBINED LAPAROSCOPIC ASSISTED HYSTERECTOMY VAGINAL, SALPINGO-OOPHORECTOMY;  LAPAROSCOPIC ASSISTED VAGINAL HYSTERECTOMY, BILATERAL SALPINGO-OOPHORECTOMY ATTEMPTED, total abdominal hysterctomy with bilateral salpingo-oopherectomy, cystoscopy;  Surgeon: Santhosh May MD;  Location: [facility]     tubal ligation  1991       Social History   Substance Use Topics     Smoking status: Never Smoker     Smokeless tobacco: Never Used      Comment: No passive exposure     Alcohol use Yes      Comment: Occasionally     Family History   Problem Relation Age of Onset     Asthma Father      Other - See Comments Father      Factor V Leiden ([place])     Heart Surgery Father 79     2 stents put in his heart.     Alzheimer  "Disease Mother      Lewjesus Body Dementia     Breast Cancer Mother      DIABETES Paternal Grandmother      DIABETES Maternal Grandmother      Other - See Comments Other      Niece, Factor V Leiden     Other - See Comments Daughter      Factor V Leiden     Other - See Comments Other      Nephew, Factor V Leiden, no family hx     Other - See Comments Sister      Factor V Leiden         Current Outpatient Prescriptions   Medication Sig Dispense Refill     cyclobenzaprine (FLEXERIL) 5 MG tablet Take 1 tablet (5 mg) by mouth 3 times daily as needed for muscle spasms 42 tablet 1     escitalopram (LEXAPRO) 10 MG tablet Take 1 tablet (10 mg) by mouth daily 30 tablet 1     zolpidem (AMBIEN CR) 6.25 MG CR tablet TAKE 1 TABLET BY MOUTH NIGHTLY AS NEEDED FOR SLEEP 30 tablet 0     loratadine (CLARITIN) 10 MG tablet Take 10 mg by mouth as needed        Allergies   Allergen Reactions     Seasonal Allergies      BP Readings from Last 3 Encounters:   02/12/18 112/78   02/11/18 119/79   02/05/18 110/78    Wt Readings from Last 3 Encounters:   02/12/18 208 lb (94.3 kg)   02/05/18 200 lb (90.7 kg)   10/06/17 196 lb (88.9 kg)                    Reviewed and updated as needed this visit by clinical staff  Tobacco  Allergies  Meds       Reviewed and updated as needed this visit by Provider         ROS:  Constitutional, neuro, ENT, endocrine, pulmonary, cardiac, gastrointestinal, genitourinary, musculoskeletal, integument and psychiatric systems are negative, except as otherwise noted.    OBJECTIVE:                                                    /78 (BP Location: Left arm, Patient Position: Sitting, Cuff Size: Adult Large)  Pulse 77  Temp 98.5  F (36.9  C) (Tympanic)  Ht 5' 8\" (1.727 m)  Wt 208 lb (94.3 kg)  LMP 07/12/2017  SpO2 99%  BMI 31.63 kg/m2  Body mass index is 31.63 kg/(m^2).  GENERAL APPEARANCE: healthy, alert and no distress  EYES: Eyes grossly normal to inspection, PERRL and conjunctivae and sclerae " normal  HENT: ear canals and TM's normal and nose and mouth without ulcers or lesions  NECK: no adenopathy, no asymmetry, masses, or scars and thyroid normal to palpation  RESP: lungs clear to auscultation - no rales, rhonchi or wheezes  CV: regular rates and rhythm, normal S1 S2, no S3 or S4 and no murmur, click or rub  LYMPHATICS: normal ant/post cervical and supraclavicular nodes  ABDOMEN: soft, nontender, without hepatosplenomegaly or masses and bowel sounds normal  MS: extremities normal- no gross deformities noted  SKIN: no suspicious lesions or rashes  NEURO: Normal strength and tone, mentation intact and speech normal  PSYCH: emotionally fragile.  Discussion held on many stressors.  She is trying to resolve.  Has never seen a therapist.      Diagnostic test results:  Diagnostic Test Results:  Results for orders placed or performed during the hospital encounter of 02/11/18 (from the past 24 hour(s))   CBC with platelets differential   Result Value Ref Range    WBC 6.0 4.0 - 11.0 10e9/L    RBC Count 4.85 3.8 - 5.2 10e12/L    Hemoglobin 14.1 11.7 - 15.7 g/dL    Hematocrit 41.0 35.0 - 47.0 %    MCV 85 78 - 100 fl    MCH 29.1 26.5 - 33.0 pg    MCHC 34.4 31.5 - 36.5 g/dL    RDW 13.0 10.0 - 15.0 %    Platelet Count 232 150 - 450 10e9/L    Diff Method Automated Method     % Neutrophils 71.5 %    % Lymphocytes 19.8 %    % Monocytes 6.2 %    % Eosinophils 1.5 %    % Basophils 0.7 %    % Immature Granulocytes 0.3 %    Nucleated RBCs 0 0 /100    Absolute Neutrophil 4.3 1.6 - 8.3 10e9/L    Absolute Lymphocytes 1.2 0.8 - 5.3 10e9/L    Absolute Monocytes 0.4 0.0 - 1.3 10e9/L    Absolute Eosinophils 0.1 0.0 - 0.7 10e9/L    Absolute Basophils 0.0 0.0 - 0.2 10e9/L    Abs Immature Granulocytes 0.0 0 - 0.4 10e9/L    Absolute Nucleated RBC 0.0    INR   Result Value Ref Range    INR 0.93 0.80 - 1.20   Partial thromboplastin time   Result Value Ref Range    PTT 25 24.00 - 37.00 sec   Troponin I   Result Value Ref Range    Troponin  I ES <0.015 0.000 - 0.045 ug/L   Comprehensive metabolic panel   Result Value Ref Range    Sodium 140 133 - 144 mmol/L    Potassium 4.1 3.4 - 5.3 mmol/L    Chloride 105 94 - 109 mmol/L    Carbon Dioxide 28 20 - 32 mmol/L    Anion Gap 7 3 - 14 mmol/L    Glucose 104 (H) 70 - 99 mg/dL    Urea Nitrogen 14 7 - 30 mg/dL    Creatinine 0.77 0.52 - 1.04 mg/dL    GFR Estimate 78 >60 mL/min/1.7m2    GFR Estimate If Black >90 >60 mL/min/1.7m2    Calcium 8.9 8.5 - 10.1 mg/dL    Bilirubin Total 0.4 0.2 - 1.3 mg/dL    Albumin 3.9 3.4 - 5.0 g/dL    Protein Total 7.4 6.8 - 8.8 g/dL    Alkaline Phosphatase 105 40 - 150 U/L    ALT 46 0 - 50 U/L    AST 21 0 - 45 U/L   Glucose by meter   Result Value Ref Range    Glucose 89 70 - 99 mg/dL   CT Head w/o Contrast    Narrative    PROCEDURE: CT HEAD W/O CONTRAST     HISTORY: confusion; .    COMPARISON: None.    TECHNIQUE:  Helical images of the head from the foramen magnum to the  vertex were obtained without contrast.    FINDINGS: The ventricles and sulci are normal in volume. No acute  intracranial hemorrhage, mass effect, midline shift, hydrocephalus or  basilar cystern effacement are present.    The grey-white matter interface is preserved.    The calvarium is intact. The mastoid air cells are clear.  The  visualized paranasal sinuses are clear.      Impression    IMPRESSION: Normal brain      TITUS KRUGER MD     Results for orders placed or performed during the hospital encounter of 02/11/18   CT Head w/o Contrast    Narrative    PROCEDURE: CT HEAD W/O CONTRAST     HISTORY: confusion; .    COMPARISON: None.    TECHNIQUE:  Helical images of the head from the foramen magnum to the  vertex were obtained without contrast.    FINDINGS: The ventricles and sulci are normal in volume. No acute  intracranial hemorrhage, mass effect, midline shift, hydrocephalus or  basilar cystern effacement are present.    The grey-white matter interface is preserved.    The calvarium is intact. The mastoid  air cells are clear.  The  visualized paranasal sinuses are clear.      Impression    IMPRESSION: Normal brain      TITUS KRUGER MD   CBC with platelets differential   Result Value Ref Range    WBC 6.0 4.0 - 11.0 10e9/L    RBC Count 4.85 3.8 - 5.2 10e12/L    Hemoglobin 14.1 11.7 - 15.7 g/dL    Hematocrit 41.0 35.0 - 47.0 %    MCV 85 78 - 100 fl    MCH 29.1 26.5 - 33.0 pg    MCHC 34.4 31.5 - 36.5 g/dL    RDW 13.0 10.0 - 15.0 %    Platelet Count 232 150 - 450 10e9/L    Diff Method Automated Method     % Neutrophils 71.5 %    % Lymphocytes 19.8 %    % Monocytes 6.2 %    % Eosinophils 1.5 %    % Basophils 0.7 %    % Immature Granulocytes 0.3 %    Nucleated RBCs 0 0 /100    Absolute Neutrophil 4.3 1.6 - 8.3 10e9/L    Absolute Lymphocytes 1.2 0.8 - 5.3 10e9/L    Absolute Monocytes 0.4 0.0 - 1.3 10e9/L    Absolute Eosinophils 0.1 0.0 - 0.7 10e9/L    Absolute Basophils 0.0 0.0 - 0.2 10e9/L    Abs Immature Granulocytes 0.0 0 - 0.4 10e9/L    Absolute Nucleated RBC 0.0    INR   Result Value Ref Range    INR 0.93 0.80 - 1.20   Partial thromboplastin time   Result Value Ref Range    PTT 25 24.00 - 37.00 sec   Troponin I   Result Value Ref Range    Troponin I ES <0.015 0.000 - 0.045 ug/L   Comprehensive metabolic panel   Result Value Ref Range    Sodium 140 133 - 144 mmol/L    Potassium 4.1 3.4 - 5.3 mmol/L    Chloride 105 94 - 109 mmol/L    Carbon Dioxide 28 20 - 32 mmol/L    Anion Gap 7 3 - 14 mmol/L    Glucose 104 (H) 70 - 99 mg/dL    Urea Nitrogen 14 7 - 30 mg/dL    Creatinine 0.77 0.52 - 1.04 mg/dL    GFR Estimate 78 >60 mL/min/1.7m2    GFR Estimate If Black >90 >60 mL/min/1.7m2    Calcium 8.9 8.5 - 10.1 mg/dL    Bilirubin Total 0.4 0.2 - 1.3 mg/dL    Albumin 3.9 3.4 - 5.0 g/dL    Protein Total 7.4 6.8 - 8.8 g/dL    Alkaline Phosphatase 105 40 - 150 U/L    ALT 46 0 - 50 U/L    AST 21 0 - 45 U/L   Glucose by meter   Result Value Ref Range    Glucose 89 70 - 99 mg/dL        ASSESSMENT/PLAN:                                                     1. Cervical paraspinal muscle spasm  She is going to be given both.  She is very stressed emotional.  Has pain into the shoulder. Neck movements are limited and tight.  Some posturing.   - cyclobenzaprine (FLEXERIL) 5 MG tablet; Take 1 tablet (5 mg) by mouth 3 times daily as needed for muscle spasms  Dispense: 42 tablet; Refill: 1  - escitalopram (LEXAPRO) 10 MG tablet; Take 1 tablet (10 mg) by mouth daily  Dispense: 30 tablet; Refill: 1  - PHYSICAL THERAPY REFERRAL    2. Stress reaction  She is going to be given referral.  She can't recall the events of Sunday at all, she had not been sleeping and is very stressed with family issues.  We will have her see psyche for recommendations and help with coping.    - PSYCHOLOGY REFERRAL  - PHYSICAL THERAPY REFERRAL        See Patient Instructions    DESTIN Elizabeth  [facility]

## 2018-02-12 NOTE — PATIENT INSTRUCTIONS
Thank you for choosing [facility].   I have office hours 8:00 am to 4:30 pm on Monday's, Wednesday's, Thursday's and Friday's. My nurse and I are out of the office every Tuesday.    Following your visit, when your labs and diagnostic testing have returned, I will review then and you will be contacted by my nurse.  If you are on My Chart, you can also view results there.    For refills, notify your pharmacy regarding what you need and the pharmacy will generate a refill request. Do not call my nurse as she is unable to process refill request. Please plan ahead and allow 3-5 days for refill requests.    You will generally receive a reminder call the day prior to your appointment.  If you cannot attend your appointment, please cancel your appointment with as much notice as possible.  If there is a pattern of failure to present for your appointments, I cannot provide consistent, meaningful, ongoing care for you. It is very important to me that you come in for your care, so we can best assist you with your health care needs.    IMPORTANT:  Please note that it is my standard of practice to NOT participate in prescribing ongoing requested Narcotic Analgesic therapy, and/or participate in the prescribing of other controlled substances.  My nurse and I am happy to assist you with the process of referral for alternative pain management as needed, and other treatment modalities including but not limited to:  Physical Therapy, Physical Medicine and Rehab, Counseling, Chiropractic Care, Orthopedic Care, and non-narcotic medication management.     In the event that you need to be seen for emergent concerns and I am out of office,  please see one of my colleagues for acute concerns.  You may also present to  or ER.  I appreciate the opportunity to serve you and look forward to supporting your healthcare needs in the future. Please contact me with any questions or concerns that you may  have.    Sincerely,      Antionette Edward RN, PA-C

## 2018-02-12 NOTE — NURSING NOTE
"Chief Complaint   Patient presents with     Migraine Mgmt     ED yesterday       Initial /78 (BP Location: Left arm, Patient Position: Sitting, Cuff Size: Adult Large)  Pulse 77  Temp 98.5  F (36.9  C) (Tympanic)  Ht 5' 8\" (1.727 m)  Wt 208 lb (94.3 kg)  LMP 07/12/2017  SpO2 99%  BMI 31.63 kg/m2 Estimated body mass index is 31.63 kg/(m^2) as calculated from the following:    Height as of this encounter: 5' 8\" (1.727 m).    Weight as of this encounter: 208 lb (94.3 kg).  Medication Reconciliation: complete   Luisa Sotomayor  "

## 2018-02-13 ASSESSMENT — PATIENT HEALTH QUESTIONNAIRE - PHQ9: SUM OF ALL RESPONSES TO PHQ QUESTIONS 1-9: 3

## 2018-02-13 ASSESSMENT — ANXIETY QUESTIONNAIRES: GAD7 TOTAL SCORE: 1

## 2018-02-19 ENCOUNTER — HOSPITAL ENCOUNTER (OUTPATIENT)
Dept: PHYSICAL THERAPY | Facility: HOSPITAL | Age: 53
Setting detail: THERAPIES SERIES
End: 2018-02-19
Attending: PHYSICIAN ASSISTANT
Payer: COMMERCIAL

## 2018-02-19 PROCEDURE — 97161 PT EVAL LOW COMPLEX 20 MIN: CPT | Mod: GP | Performed by: PHYSICAL THERAPIST

## 2018-02-19 PROCEDURE — G8982 BODY POS GOAL STATUS: HCPCS | Mod: GP,CH | Performed by: PHYSICAL THERAPIST

## 2018-02-19 PROCEDURE — G8981 BODY POS CURRENT STATUS: HCPCS | Mod: GP,CJ | Performed by: PHYSICAL THERAPIST

## 2018-02-19 PROCEDURE — 97140 MANUAL THERAPY 1/> REGIONS: CPT | Mod: GP | Performed by: PHYSICAL THERAPIST

## 2018-02-19 NOTE — PROGRESS NOTES
02/19/18 0900   General Information   Type of Visit Initial OP Ortho PT Evaluation   Start of Care Date 02/19/18   Referring Physician Antionette Edward   Patient/Family Goals Statement less pain    Orders Evaluate and Treat   Date of Order 02/12/18   Insurance Type [employer]   Insurance Comments/Visits Authorized auth after eval   Medical Diagnosis L neck/upper back pain   Surgical/Medical history reviewed Yes   Precautions/Limitations no known precautions/limitations   General Information Comments PMH: Hysterectomy 8/2017   Body Part(s)   Body Part(s) Cervical Spine   Presentation and Etiology   Pertinent history of current problem (include personal factors and/or comorbidities that impact the POC) Patient reports insidious onset of L neck and upper back pain that started about 1.5 weeks ago. States she has been dealing with a lot of stress and that mary have contributed to it. She has not had any particular injuries. She notes occasional tingling into her L arm/hand. She has not had surgery on her neck or upper back. She gets headaches as well and states that her frequency of headaches has not changed over the past 2 weeks. She notes her pain is located along the top of her L shoulder blade and up into the back of her neck   Impairments A. Pain;L. Tingling;N. Headaches   Functional Limitations perform activities of daily living;perform required work activities   Symptom Location L neck/upper back   How/Where did it occur From insidious onset   Onset date of current episode/exacerbation 02/12/18  (physician order date)   Chronicity New   Pain rating (0-10 point scale) Best (/10);Worst (/10)   Best (/10) 2   Worst (/10) 7   Pain quality C. Aching   Frequency of pain/symptoms A. Constant   Pain/symptoms are: Worse during the day   Pain/symptoms exacerbated by C. Lifting;D. Carrying   Pain/symptoms eased by C. Rest   Progression of symptoms since onset: Unchanged   Current / Previous Interventions   Diagnostic Tests:  (Unremarkable)   Current Level of Function   Current Community Support Family/friend caregiver   Patient role/employment history A. Employed   Employment Comments    Fall Risk Screen   Fall screen completed by PT   Have you fallen 2 or more times in the past year? No   Have you fallen and had an injury in the past year? No   Is patient a fall risk? No   System Outcome Measures   Outcome Measures (NDI)   Cervical Spine   Observation no acute distress   Integumentary  WNL   Posture Rounded shoulders, forward head   Cervical Flexion ROM WNL + pull   Cervical Extension ROM WNL   Cervical Right Side Bending ROM mod sanchez + pull   Cervical Left Side Bending ROM min sanchez + pinch   Cervical Right Rotation ROM min sanchez   Cervical Left Rotation ROM WNL   Thoracic Flexion ROM WNL + pull   Thoracic Extension ROM min sanchez + pull   Thoracic Right Side Bending ROM min sanchez + pull   Thoracic Left Sidebending ROM  WNL   Thoracic Right Rotation min sanchez + pull   Thoracic Left Rotation WNL   Shoulder AROM Screen WNL   Cervical/Thoracic/Shoulder ROM Comments + lavell with thoracic ROT R   Shoulder Shrug (C2-C4) Strength 5/5   Shoulder Abd (C5) Strength 5/5   Shoulder Add (C7) Strength 5/5   Shoulder ER (C5, C6) Strength 5/5   Shoulder IR (C5, C6) Strength 5/5   Elbow Flexion (C5, C6) Strength 5/5   Elbow Extension (C7) Strength 5/5   Wrist Extension (C6) Strength 5/5   Wrist Flexion (C7) Strength 5/5   Thumb Abd (C8) Strength 5/5   Upper Trapezius Flexibility hypo+   Levator Scapula Flexibility hypo    Pectoralis Minor Flexibility hypo+ L>R   Vertebral Artery Test neg   Alar Ligament Test neg   Transverse Ligament Test neg   Spurling Test neg   Neck Flexor Endurance Test (normal 39 sec) poor 7/39 sec   Segmental Mobility-Cervical hypo at CTJ and C4-C6   Segmental Mobility-Thoracic Hypo T1-T6   Palpation TTP and increased tissue tightness appreciated in L UT, rhomboids, levator, pec minor, suboccipitals and cervical paraspinals   Biceps  Reflexes symm   Brachioradialis Reflexes symm   Triceps Reflexes symm   UE Neural Tension UE Neural Tension Tests   ULTT I (Median and Anterior Interosseous) Negative   ULTT II (Median and Musculocutaneous and Axillary) Negative   ULTT III (Radial) Negative   ULTT IV (Ulnar) Negative   Planned Therapy Interventions   Planned Therapy Interventions joint mobilization;manual therapy;neuromuscular re-education;ROM;strengthening;stretching   Planned Modality Interventions   Planned Modality Interventions Comments as needed   Clinical Impression   Criteria for Skilled Therapeutic Interventions Met yes, treatment indicated   PT Diagnosis L neck and upper back pain   Influenced by the following impairments pain and impaired tissue mobility   Functional limitations due to impairments difficulty performing home and work tasks   Clinical Presentation Stable/Uncomplicated   Clinical Presentation Rationale due to lack of additional comorbidities that may influence anticipated PT progress   Clinical Decision Making (Complexity) Low complexity   Therapy Frequency 1 time/week   Predicted Duration of Therapy Intervention (days/wks) up to 6 weeks   Risk & Benefits of therapy have been explained Yes   Patient, Family & other staff in agreement with plan of care Yes   Clinical Impression Comments Presentation is consistent with L neck and upper back pain that is expected to improve with skilled PT intervention   Education Assessment   Preferred Learning Style Demonstration   Barriers to Learning No barriers   ORTHO GOALS   PT Ortho Eval Goals 1;2;3   Ortho Goal 1   Goal Identifier STG 1   Goal Description Patient will report decreased worst PL in L neck and upper back to 4/10 or less in order to perform all home and work tasks   Target Date 03/12/18   Ortho Goal 2   Goal Identifier LTG 1   Goal Description Patient will report decreased worst PL in L neck and upper back to 2/10 or less in order to perform all home and work tasks    Target Date 04/02/18   Ortho Goal 3   Goal Identifier LTG 2   Goal Description Patient will report overall 70% or better improvement in her L neck and upper back pain in order to perform home and work tasks   Target Date 04/02/18   Total Evaluation Time   Total Evaluation Time 23

## 2018-02-21 ENCOUNTER — OFFICE VISIT (OUTPATIENT)
Dept: SURGERY | Facility: OTHER | Age: 53
End: 2018-02-21
Attending: PHYSICIAN ASSISTANT
Payer: COMMERCIAL

## 2018-02-21 VITALS
BODY MASS INDEX: 31.52 KG/M2 | TEMPERATURE: 98 F | WEIGHT: 208 LBS | DIASTOLIC BLOOD PRESSURE: 68 MMHG | HEIGHT: 68 IN | OXYGEN SATURATION: 98 % | HEART RATE: 86 BPM | SYSTOLIC BLOOD PRESSURE: 108 MMHG

## 2018-02-21 DIAGNOSIS — R10.13 EPIGASTRIC PAIN: Primary | ICD-10-CM

## 2018-02-21 PROCEDURE — 99244 OFF/OP CNSLTJ NEW/EST MOD 40: CPT | Performed by: SURGERY

## 2018-02-21 ASSESSMENT — PAIN SCALES - GENERAL: PAINLEVEL: NO PAIN (0)

## 2018-02-21 NOTE — MR AVS SNAPSHOT
After Visit Summary   2/21/2018    Shweta Harris    MRN: [id number]           Patient Information     Date Of Birth          1965        Visit Information        Provider Department      2/21/2018 3:00 PM Lorenzo Berry MD [facility]        Care Instructions    Thank you for allowing Dr. Berry and our surgical team to participate in your care.  If you have a scheduling or an appointment question please contact [facility] Health Unit Coordinator at her direct line [phone].   ALL nursing questions or concerns can be directed to Yasmine [phone]       You are scheduled for a: EGD  Your procedure date is: 3/5/18      You need a friend or family member available to drive you home AND stay with you for 24 hours after you leave the hospital. You will not be allowed to drive yourself. IF you need to take a taxi or the bus you MUST have a responsible person to ride with you. YOUR PROCEDURE WILL BE CANCELLED IF YOU DO NOT HAVE A RESPONSIBLE ADULT TO DRIVE YOU HOME.       You CANNOT have anything to eat or drink after midnight the night before your surgery, ncluding water and coffee. Your stomach needs to be completely empty. Do NOT chew gum, suck on hard candy, or smoke. You can brush your teeth the morning of surgery.       You need to call our Surgery Education Nurses 1-2 weeks prior to your surgery date at  [phone] or toll free [phone]. Please have you medication and allergy lists ready.      Stop your aspirin or other NSAIDs(Ibuprofen, Motrin, Aleve, Celebrex, Naproxen, etc...) 7 days before your surgery.      Hospital admitting will call you the day before your surgery with your arrival time. If you are scheduled on a Monday admitting will call you the Friday before.      Please call your primary care physician if you should become ill within 24 hours of scheduled surgery. (ex.vomiting, diarrhea, fever)                Follow-ups after your visit         Your next 10 appointments already scheduled     Feb 26, 2018  8:30 AM CST   Treatment with Delia Vasquez, PT   [facility] )    [address] [address]   [phone]            Mar 06, 2018  8:30 AM CST   Treatment with Delia Vasquez, PT   [facility] ([facility] )    [address] [address]   [phone]            Mar 12, 2018  9:00 AM CDT   Treatment with Delia Vasquez, PT   [facility] ([facility] )    [address] [address]   [phone]            Mar 12, 2018  9:30 AM CDT   (Arrive by 9:15 AM)   Office Visit with DESTIN Zuleta   [facility] )    [address] [address]   [phone]            Mar 19, 2018  8:30 AM CDT   Treatment with Delia Vasquez, PT   [facility] ([facility] )    [address] [place] [address]   [phone]              Who to contact     If you have questions or need follow up information about today's clinic visit or your schedule please contact [facility] directly at [phone].  Normal or non-critical lab and imaging results will be communicated to you by MyChart, letter or phone within 4 business days after the clinic has received the results. If you do not hear from us within 7 days, please contact the clinic through MyChart or phone. If you have a critical or abnormal lab result, we will notify you by phone as soon as possible.  Submit refill requests through [employer] or call your pharmacy and they will forward the refill request to us. Please allow 3 business days for your refill to be completed.          Additional Information About Your Visit        [id number] Information     [employer] gives you secure access to your electronic health record. If you see a primary care provider, you can also send messages to your care team and  "make appointments. If you have questions, please call your primary care clinic.  If you do not have a primary care provider, please call [phone] and they will assist you.        Care EveryWhere ID     This is your Care EveryWhere ID. This could be used by other organizations to access your [facility] medical records  [id number]        Your Vitals Were     Pulse Temperature Height Last Period Pulse Oximetry BMI (Body Mass Index)    86 98  F (36.7  C) (Tympanic) 5' 8\" (1.727 m) 07/12/2017 98% 31.63 kg/m2       Blood Pressure from Last 3 Encounters:   02/21/18 108/68   02/12/18 112/78   02/11/18 119/79    Weight from Last 3 Encounters:   02/21/18 208 lb (94.3 kg)   02/12/18 208 lb (94.3 kg)   02/05/18 200 lb (90.7 kg)              Today, you had the following     No orders found for display       Primary Care Provider Office Phone # Fax #    DESTIN Zuleta [phone]       [facility] [address] [facility] [address]  [facility] [address]        Equal Access to Services     [facility] Hospital[facility]ALBERTO AH: Hadii aad ku hadasho Soomaali, waaxda luqadaha, qaybta kaalmada adeegyada, waxay idiin hayaan adeeg kharash la'aan ah. So [facility] [phone].    ATENCIÓN: Si habla español, tiene a boone disposición servicios gratuitos de asistencia lingüística. Olaf al [phone].    We comply with applicable federal civil rights laws and [place] laws. We do not discriminate on the basis of race, color, national origin, age, disability, sex, sexual orientation, or gender identity.            Thank you!     Thank you for choosing [facility]  for your care. Our goal is always to provide you with excellent care. Hearing back from our patients is one way we can continue to improve our services. Please take a few minutes to complete the written survey that you may receive in the mail after your visit with us. Thank you!             Your Updated Medication List - Protect others around you: Learn how to safely use, " store and throw away your medicines at www.disposemymeds.org.          This list is accurate as of 2/21/18  3:25 PM.  Always use your most recent med list.                   Brand Name Dispense Instructions for use Diagnosis    cyclobenzaprine 5 MG tablet    FLEXERIL    42 tablet    Take 1 tablet (5 mg) by mouth 3 times daily as needed for muscle spasms    Cervical paraspinal muscle spasm       escitalopram 10 MG tablet    LEXAPRO    30 tablet    Take 1 tablet (10 mg) by mouth daily    Cervical paraspinal muscle spasm       loratadine 10 MG tablet    CLARITIN     Take 10 mg by mouth as needed        zolpidem 6.25 MG CR tablet    AMBIEN CR    30 tablet    TAKE 1 TABLET BY MOUTH NIGHTLY AS NEEDED FOR SLEEP    Primary insomnia

## 2018-02-21 NOTE — NURSING NOTE
"Chief Complaint   Patient presents with     Consult     Gallbladder consult.  HERB Edward is referring.  u/s done 2/6/18.  Has abdominal pain when she eats.         Initial /68 (BP Location: Left arm, Patient Position: Chair, Cuff Size: Adult Large)  Pulse 86  Temp 98  F (36.7  C) (Tympanic)  Ht 5' 8\" (1.727 m)  Wt 208 lb (94.3 kg)  LMP 07/12/2017  SpO2 98%  BMI 31.63 kg/m2 Estimated body mass index is 31.63 kg/(m^2) as calculated from the following:    Height as of this encounter: 5' 8\" (1.727 m).    Weight as of this encounter: 208 lb (94.3 kg).  Medication Reconciliation: gem MCLEAN      "

## 2018-02-21 NOTE — PROGRESS NOTES
[facility] Surgery Consultation    CC:  Right upper quadrant pain    HPI:  This 52 year old year old female is seen at the request of Antionette Edward for evaluation of right upper quadrant pain.  The history is obtained from the patient, and reviewing the medical record.  She is good medical historian.   states that over the past few months she has developed epigastric and right upper quadrant discomfort postprandial in nature.  She states that the pain will typically come on 10-15 minutes after eating last approximately hour in duration.  At times the pain will radiate to her back and around the right side to her flank.  She has had no associated nausea or emesis associated with these events.  She also states that with her epigastric discomfort and right upper quadrant discomfort she will select some bloating.  She then went on to say that she has sporadic fevers or temperature the the 100.0.  When she developed these fevers she has no known associated pain.  She is unsure of any temporal relation with the fevers that she has at home.  She has no associated chills.  She was seen and evaluated by her primary care physician and she underwent labs which were normal and the abdominal ultrasound.  On the abdominal ultrasound there is evidence of gallbladder polyps with no gallstones.  She was then referred for further evaluation care.    Past Medical History:   Diagnosis Date     Dermatitis fungal 6/30/2011     Factor V Leiden 6/28/2012     Family history of other blood disorders 6/30/2011     Heterozygous factor V Leiden mutation (H) 10/9/2014     Heterozygous factor V Leiden mutation (H)      Palpitations 9/22/2006       Past Surgical History:   Procedure Laterality Date     COLONOSCOPY N/A 12/1/2014    Procedure: COLONOSCOPY;  Surgeon: Blanche Trivedi MD;  [facility]     CYSTOSCOPY N/A 8/23/2017    Procedure: CYSTOSCOPY;;  Surgeon: Santhosh May MD;  [facility]     GYN SURGERY        HYSTERECTOMY TOTAL ABDOMINAL  8/23/2017    Procedure: HYSTERECTOMY TOTAL ABDOMINAL;;  Surgeon: Santhosh May MD;  [facility]     LAPAROSCOPIC ASSISTED HYSTERECTOMY VAGINAL, BILATERAL SALPINGO-OOPHORECTOMY, COMBINED N/A 8/23/2017    Procedure: COMBINED LAPAROSCOPIC ASSISTED HYSTERECTOMY VAGINAL, SALPINGO-OOPHORECTOMY;  LAPAROSCOPIC ASSISTED VAGINAL HYSTERECTOMY, BILATERAL SALPINGO-OOPHORECTOMY ATTEMPTED, total abdominal hysterctomy with bilateral salpingo-oopherectomy, cystoscopy;  Surgeon: Santhosh May MD;  [facility]     tubal ligation  1991       Pt denied problems with bleeding or anesthesia    Current Outpatient Prescriptions   Medication Sig Dispense Refill     zolpidem (AMBIEN CR) 6.25 MG CR tablet TAKE 1 TABLET BY MOUTH NIGHTLY AS NEEDED FOR SLEEP 30 tablet 0     cyclobenzaprine (FLEXERIL) 5 MG tablet Take 1 tablet (5 mg) by mouth 3 times daily as needed for muscle spasms (Patient not taking: Reported on 2/21/2018) 42 tablet 1     escitalopram (LEXAPRO) 10 MG tablet Take 1 tablet (10 mg) by mouth daily (Patient not taking: Reported on 2/21/2018) 30 tablet 1     loratadine (CLARITIN) 10 MG tablet Take 10 mg by mouth as needed             Allergies   Allergen Reactions     Seasonal Allergies          HABITS:    Social History   Substance Use Topics     Smoking status: Never Smoker     Smokeless tobacco: Never Used      Comment: No passive exposure     Alcohol use Yes      Comment: Occasionally     No mood altering drug use.    Family History   Problem Relation Age of Onset     Asthma Father      Other - See Comments Father      Factor V Leiden (Carrier)     Heart Surgery Father 79     2 stents put in his heart.     Alzheimer Disease Mother      Lewie Body Dementia     Breast Cancer Mother      DIABETES Paternal Grandmother      DIABETES Maternal Grandmother      Other - See Comments Other      Niece, Factor V Leiden     Other - See Comments Daughter      Factor V Leiden     Other - See Comments  "Other      Nephew, Factor V Leiden, no family hx     Other - See Comments Sister      Factor V Leiden       REVIEW OF SYSTEMS:  Ten point review of systems negative except those mentioned in the HPI.     The patient denies sleep apnea, latex allergies or MRSA    OBJECTIVE:    /68 (BP Location: Left arm, Patient Position: Chair, Cuff Size: Adult Large)  Pulse 86  Temp 98  F (36.7  C) (Tympanic)  Ht 5' 8\" (1.727 m)  Wt 208 lb (94.3 kg)  LMP 07/12/2017  SpO2 98%  BMI 31.63 kg/m2    GENERAL: Generally appears well, in no distress with appropriate affect.  HEENT:   Sclerae anicteric - No cervical, supra/infraclavicular lymphadenopathy, no thyroid masses  Respiratory:  Lungs clear to ausculation bilaterally with good air excursion  Cardiovascular:  Regular Rate and Rhythm with no murmurs gallops or rubs, normal   Abdomen: soft, non-tender, non-distended   :  deferred  Extremities:  Extremities normal. No deformities, edema, or skin discoloration.  Skin:  no suspicious lesions or rashes  Neurological: grossly intact    Psych:  Alert, oriented, affect appropriate with normal decision making ability.      IMPRESSION:  52 year old female with epigastric and right upper quadrant pain    PLAN:  I discussed with the patient due to her epigastric and right upper quadrant tenderness he could be related to a wide variety of etiologies.  I recommend that she undergo endoscopic evaluation of her esophagus stomach and duodenum for any potential gastritis, duodenitis, ulcerations.  If there is no evidence of any pathology within her esophagus stomach or duodenum we can then have further discussion of possible cholecystectomy.  Patient understands and wishes to proceed forward with endoscopic evaluation.  An informed consent was obtained documenting the risks including but not limited to bleeding, infection, perforation.  We will plan on performing this procedure at mutually convenient time.    Thank you for allowing me " to participate in the care of your patient.           Lorenzo Berry MD    2/21/2018  2:59 PM    cc:  Antionette Edward

## 2018-02-21 NOTE — PATIENT INSTRUCTIONS
Thank you for allowing Dr. Berry and our surgical team to participate in your care.  If you have a scheduling or an appointment question please contact Krysta our Health Unit Coordinator at her direct line [phone].   ALL nursing questions or concerns can be directed to Yasmine [phone]       You are scheduled for a: EGD  Your procedure date is: 3/5/18      You need a friend or family member available to drive you home AND stay with you for 24 hours after you leave the hospital. You will not be allowed to drive yourself. IF you need to take a taxi or the bus you MUST have a responsible person to ride with you. YOUR PROCEDURE WILL BE CANCELLED IF YOU DO NOT HAVE A RESPONSIBLE ADULT TO DRIVE YOU HOME.       You CANNOT have anything to eat or drink after midnight the night before your surgery, ncluding water and coffee. Your stomach needs to be completely empty. Do NOT chew gum, suck on hard candy, or smoke. You can brush your teeth the morning of surgery.       You need to call our Surgery Education Nurses 1-2 weeks prior to your surgery date at  [phone] or toll free [phone]. Please have you medication and allergy lists ready.      Stop your aspirin or other NSAIDs(Ibuprofen, Motrin, Aleve, Celebrex, Naproxen, etc...) 7 days before your surgery.      Hospital admitting will call you the day before your surgery with your arrival time. If you are scheduled on a Monday admitting will call you the Friday before.      Please call your primary care physician if you should become ill within 24 hours of scheduled surgery. (ex.vomiting, diarrhea, fever)

## 2018-02-22 NOTE — H&P (VIEW-ONLY)
[facility] Surgery Consultation    CC:  Right upper quadrant pain    HPI:  This 52 year old year old female is seen at the request of Antionette Edward for evaluation of right upper quadrant pain.  The history is obtained from the patient, and reviewing the medical record.  She is good medical historian.   states that over the past few months she has developed epigastric and right upper quadrant discomfort postprandial in nature.  She states that the pain will typically come on 10-15 minutes after eating last approximately hour in duration.  At times the pain will radiate to her back and around the right side to her flank.  She has had no associated nausea or emesis associated with these events.  She also states that with her epigastric discomfort and right upper quadrant discomfort she will select some bloating.  She then went on to say that she has sporadic fevers or temperature the the 100.0.  When she developed these fevers she has no known associated pain.  She is unsure of any temporal relation with the fevers that she has at home.  She has no associated chills.  She was seen and evaluated by her primary care physician and she underwent labs which were normal and the abdominal ultrasound.  On the abdominal ultrasound there is evidence of gallbladder polyps with no gallstones.  She was then referred for further evaluation care.    Past Medical History:   Diagnosis Date     Dermatitis fungal 6/30/2011     Factor V Leiden 6/28/2012     Family history of other blood disorders 6/30/2011     Heterozygous factor V Leiden mutation (H) 10/9/2014     Heterozygous factor V Leiden mutation (H)      Palpitations 9/22/2006       Past Surgical History:   Procedure Laterality Date     COLONOSCOPY N/A 12/1/2014    Procedure: COLONOSCOPY;  Surgeon: Blanche Trivedi MD;  [facility]     CYSTOSCOPY N/A 8/23/2017    Procedure: CYSTOSCOPY;;  Surgeon: Santhosh May MD;  [facility]     GYN SURGERY        HYSTERECTOMY TOTAL ABDOMINAL  8/23/2017    Procedure: HYSTERECTOMY TOTAL ABDOMINAL;;  Surgeon: Santhosh May MD;  [facility]     LAPAROSCOPIC ASSISTED HYSTERECTOMY VAGINAL, BILATERAL SALPINGO-OOPHORECTOMY, COMBINED N/A 8/23/2017    Procedure: COMBINED LAPAROSCOPIC ASSISTED HYSTERECTOMY VAGINAL, SALPINGO-OOPHORECTOMY;  LAPAROSCOPIC ASSISTED VAGINAL HYSTERECTOMY, BILATERAL SALPINGO-OOPHORECTOMY ATTEMPTED, total abdominal hysterctomy with bilateral salpingo-oopherectomy, cystoscopy;  Surgeon: Santhosh May MD;  [facility]     tubal ligation  1991       Pt denied problems with bleeding or anesthesia    Current Outpatient Prescriptions   Medication Sig Dispense Refill     zolpidem (AMBIEN CR) 6.25 MG CR tablet TAKE 1 TABLET BY MOUTH NIGHTLY AS NEEDED FOR SLEEP 30 tablet 0     cyclobenzaprine (FLEXERIL) 5 MG tablet Take 1 tablet (5 mg) by mouth 3 times daily as needed for muscle spasms (Patient not taking: Reported on 2/21/2018) 42 tablet 1     escitalopram (LEXAPRO) 10 MG tablet Take 1 tablet (10 mg) by mouth daily (Patient not taking: Reported on 2/21/2018) 30 tablet 1     loratadine (CLARITIN) 10 MG tablet Take 10 mg by mouth as needed             Allergies   Allergen Reactions     Seasonal Allergies          HABITS:    Social History   Substance Use Topics     Smoking status: Never Smoker     Smokeless tobacco: Never Used      Comment: No passive exposure     Alcohol use Yes      Comment: Occasionally     No mood altering drug use.    Family History   Problem Relation Age of Onset     Asthma Father      Other - See Comments Father      Factor V Leiden (Carrier)     Heart Surgery Father 79     2 stents put in his heart.     Alzheimer Disease Mother      Lewie Body Dementia     Breast Cancer Mother      DIABETES Paternal Grandmother      DIABETES Maternal Grandmother      Other - See Comments Other      Niece, Factor V Leiden     Other - See Comments Daughter      Factor V Leiden     Other - See Comments  "Other      Nephew, Factor V Leiden, no family hx     Other - See Comments Sister      Factor V Leiden       REVIEW OF SYSTEMS:  Ten point review of systems negative except those mentioned in the HPI.     The patient denies sleep apnea, latex allergies or MRSA    OBJECTIVE:    /68 (BP Location: Left arm, Patient Position: Chair, Cuff Size: Adult Large)  Pulse 86  Temp 98  F (36.7  C) (Tympanic)  Ht 5' 8\" (1.727 m)  Wt 208 lb (94.3 kg)  LMP 07/12/2017  SpO2 98%  BMI 31.63 kg/m2    GENERAL: Generally appears well, in no distress with appropriate affect.  HEENT:   Sclerae anicteric - No cervical, supra/infraclavicular lymphadenopathy, no thyroid masses  Respiratory:  Lungs clear to ausculation bilaterally with good air excursion  Cardiovascular:  Regular Rate and Rhythm with no murmurs gallops or rubs, normal   Abdomen: soft, non-tender, non-distended   :  deferred  Extremities:  Extremities normal. No deformities, edema, or skin discoloration.  Skin:  no suspicious lesions or rashes  Neurological: grossly intact    Psych:  Alert, oriented, affect appropriate with normal decision making ability.      IMPRESSION:  52 year old female with epigastric and right upper quadrant pain    PLAN:  I discussed with the patient due to her epigastric and right upper quadrant tenderness he could be related to a wide variety of etiologies.  I recommend that she undergo endoscopic evaluation of her esophagus stomach and duodenum for any potential gastritis, duodenitis, ulcerations.  If there is no evidence of any pathology within her esophagus stomach or duodenum we can then have further discussion of possible cholecystectomy.  Patient understands and wishes to proceed forward with endoscopic evaluation.  An informed consent was obtained documenting the risks including but not limited to bleeding, infection, perforation.  We will plan on performing this procedure at mutually convenient time.    Thank you for allowing me " to participate in the care of your patient.           Lorenzo Berry MD    2/21/2018  2:59 PM    cc:  Antionette Edward

## 2018-02-26 ENCOUNTER — HOSPITAL ENCOUNTER (OUTPATIENT)
Dept: PHYSICAL THERAPY | Facility: HOSPITAL | Age: 53
Setting detail: THERAPIES SERIES
End: 2018-02-26
Attending: PHYSICIAN ASSISTANT
Payer: COMMERCIAL

## 2018-02-26 PROCEDURE — 97140 MANUAL THERAPY 1/> REGIONS: CPT | Mod: GP | Performed by: PHYSICAL THERAPIST

## 2018-03-02 DIAGNOSIS — F51.01 PRIMARY INSOMNIA: ICD-10-CM

## 2018-03-02 RX ORDER — ZOLPIDEM TARTRATE 6.25 MG/1
TABLET, FILM COATED, EXTENDED RELEASE ORAL
Qty: 30 TABLET | Refills: 0 | Status: SHIPPED | OUTPATIENT
Start: 2018-03-02 | End: 2018-03-12 | Stop reason: DRUGHIGH

## 2018-03-02 NOTE — TELEPHONE ENCOUNTER
Ambien      Last Written Prescription Date:  1/12/18  Last Fill Quantity: 30,   # refills: 0  Last Office Visit: 2/12/18  Future Office visit:    Next 5 appointments (look out 90 days)     Mar 12, 2018  9:30 AM CDT   (Arrive by 9:15 AM)   Office Visit with DESTIN Zuleta   [facility] ([facility] )    [address] [place] [address] [place] [address]   [phone]                   Routing refill request to provider for review/approval because:  Drug not on the [facility], [facility] or  Health refill protocol or controlled substance

## 2018-03-04 ENCOUNTER — ANESTHESIA EVENT (OUTPATIENT)
Dept: SURGERY | Facility: HOSPITAL | Age: 53
End: 2018-03-04
Payer: COMMERCIAL

## 2018-03-04 ASSESSMENT — ENCOUNTER SYMPTOMS: DYSRHYTHMIAS: 1

## 2018-03-04 NOTE — ANESTHESIA PREPROCEDURE EVALUATION
Anesthesia Evaluation     . Pt has had prior anesthetic.     No history of anesthetic complications          ROS/MED HX    ENT/Pulmonary:  - neg pulmonary ROS     Neurologic:  - neg neurologic ROS     Cardiovascular:     (+) ----. : . . . :. dysrhythmias Irregular Heartbeat/Palpitations, .       METS/Exercise Tolerance:     Hematologic:     (+) Other Hematologic Disorder-Heterozygous factor V Leiden mutation       Musculoskeletal:  - neg musculoskeletal ROS       GI/Hepatic:     (+) GERD       Renal/Genitourinary:  - ROS Renal section negative       Endo:     (+) Obesity, .      Psychiatric:  - neg psychiatric ROS       Infectious Disease:  - neg infectious disease ROS       Malignancy:      - no malignancy   Other:    - neg other ROS                 Physical Exam  Normal systems: dental    Airway   Mallampati: II  TM distance: >3 FB  Neck ROM: full    Dental     Cardiovascular   Rhythm and rate: regular and normal      Pulmonary    breath sounds clear to auscultation                    Anesthesia Plan      History & Physical Review  History and physical reviewed and following examination; no interval change.    ASA Status:  2 .    NPO Status:  > 8 hours    Plan for MAC with Intravenous and Propofol induction. Maintenance will be TIVA.    PONV prophylaxis:  Ondansetron (or other 5HT-3)       Postoperative Care  Postoperative pain management:  IV analgesics.      Consents  Anesthetic plan, risks, benefits and alternatives discussed with:  Patient..                          .

## 2018-03-05 ENCOUNTER — ANESTHESIA (OUTPATIENT)
Dept: SURGERY | Facility: HOSPITAL | Age: 53
End: 2018-03-05
Payer: COMMERCIAL

## 2018-03-05 ENCOUNTER — HOSPITAL ENCOUNTER (OUTPATIENT)
Facility: HOSPITAL | Age: 53
Discharge: HOME | End: 2018-03-05
Attending: SURGERY | Admitting: SURGERY
Payer: COMMERCIAL

## 2018-03-05 ENCOUNTER — SURGERY (OUTPATIENT)
Age: 53
End: 2018-03-05

## 2018-03-05 VITALS
WEIGHT: 204.8 LBS | OXYGEN SATURATION: 98 % | SYSTOLIC BLOOD PRESSURE: 116 MMHG | HEIGHT: 68 IN | TEMPERATURE: 96.9 F | DIASTOLIC BLOOD PRESSURE: 84 MMHG | BODY MASS INDEX: 31.04 KG/M2

## 2018-03-05 PROCEDURE — 25000128 H RX IP 250 OP 636: Performed by: NURSE ANESTHETIST, CERTIFIED REGISTERED

## 2018-03-05 PROCEDURE — 25000125 ZZHC RX 250: Performed by: NURSE ANESTHETIST, CERTIFIED REGISTERED

## 2018-03-05 PROCEDURE — 43239 EGD BIOPSY SINGLE/MULTIPLE: CPT | Performed by: ANESTHESIOLOGY

## 2018-03-05 PROCEDURE — 25000128 H RX IP 250 OP 636: Performed by: ANESTHESIOLOGY

## 2018-03-05 PROCEDURE — 25000125 ZZHC RX 250: Performed by: SURGERY

## 2018-03-05 PROCEDURE — 71000027 ZZH RECOVERY PHASE 2 EACH 15 MINS: Performed by: SURGERY

## 2018-03-05 PROCEDURE — 36000050 ZZH SURGERY LEVEL 2 1ST 30 MIN: Performed by: SURGERY

## 2018-03-05 PROCEDURE — 40000306 ZZH STATISTIC PRE PROC ASSESS II: Performed by: SURGERY

## 2018-03-05 PROCEDURE — 37000008 ZZH ANESTHESIA TECHNICAL FEE, 1ST 30 MIN: Performed by: SURGERY

## 2018-03-05 PROCEDURE — 43239 EGD BIOPSY SINGLE/MULTIPLE: CPT | Performed by: SURGERY

## 2018-03-05 PROCEDURE — 01999 UNLISTED ANES PROCEDURE: CPT | Performed by: NURSE ANESTHETIST, CERTIFIED REGISTERED

## 2018-03-05 PROCEDURE — 88305 TISSUE EXAM BY PATHOLOGIST: CPT | Mod: TC | Performed by: SURGERY

## 2018-03-05 RX ORDER — ONDANSETRON 2 MG/ML
4 INJECTION INTRAMUSCULAR; INTRAVENOUS EVERY 30 MIN PRN
Status: DISCONTINUED | OUTPATIENT
Start: 2018-03-05 | End: 2018-03-05 | Stop reason: HOSPADM

## 2018-03-05 RX ORDER — KETOROLAC TROMETHAMINE 30 MG/ML
30 INJECTION, SOLUTION INTRAMUSCULAR; INTRAVENOUS EVERY 6 HOURS PRN
Status: DISCONTINUED | OUTPATIENT
Start: 2018-03-05 | End: 2018-03-05 | Stop reason: HOSPADM

## 2018-03-05 RX ORDER — FENTANYL CITRATE 50 UG/ML
25-50 INJECTION, SOLUTION INTRAMUSCULAR; INTRAVENOUS
Status: DISCONTINUED | OUTPATIENT
Start: 2018-03-05 | End: 2018-03-05 | Stop reason: HOSPADM

## 2018-03-05 RX ORDER — NALOXONE HYDROCHLORIDE 0.4 MG/ML
.1-.4 INJECTION, SOLUTION INTRAMUSCULAR; INTRAVENOUS; SUBCUTANEOUS
Status: DISCONTINUED | OUTPATIENT
Start: 2018-03-05 | End: 2018-03-05 | Stop reason: HOSPADM

## 2018-03-05 RX ORDER — LABETALOL HYDROCHLORIDE 5 MG/ML
10 INJECTION, SOLUTION INTRAVENOUS
Status: DISCONTINUED | OUTPATIENT
Start: 2018-03-05 | End: 2018-03-05 | Stop reason: HOSPADM

## 2018-03-05 RX ORDER — SODIUM CHLORIDE, SODIUM LACTATE, POTASSIUM CHLORIDE, CALCIUM CHLORIDE 600; 310; 30; 20 MG/100ML; MG/100ML; MG/100ML; MG/100ML
INJECTION, SOLUTION INTRAVENOUS CONTINUOUS
Status: DISCONTINUED | OUTPATIENT
Start: 2018-03-05 | End: 2018-03-05 | Stop reason: HOSPADM

## 2018-03-05 RX ORDER — PROMETHAZINE HYDROCHLORIDE 25 MG/ML
12.5 INJECTION, SOLUTION INTRAMUSCULAR; INTRAVENOUS
Status: DISCONTINUED | OUTPATIENT
Start: 2018-03-05 | End: 2018-03-05 | Stop reason: HOSPADM

## 2018-03-05 RX ORDER — GLYCOPYRROLATE 0.2 MG/ML
INJECTION, SOLUTION INTRAMUSCULAR; INTRAVENOUS PRN
Status: DISCONTINUED | OUTPATIENT
Start: 2018-03-05 | End: 2018-03-05

## 2018-03-05 RX ORDER — PROPOFOL 10 MG/ML
INJECTION, EMULSION INTRAVENOUS PRN
Status: DISCONTINUED | OUTPATIENT
Start: 2018-03-05 | End: 2018-03-05

## 2018-03-05 RX ORDER — MEPERIDINE HYDROCHLORIDE 25 MG/ML
12.5 INJECTION INTRAMUSCULAR; INTRAVENOUS; SUBCUTANEOUS
Status: DISCONTINUED | OUTPATIENT
Start: 2018-03-05 | End: 2018-03-05 | Stop reason: HOSPADM

## 2018-03-05 RX ORDER — LIDOCAINE HYDROCHLORIDE 20 MG/ML
INJECTION, SOLUTION INFILTRATION; PERINEURAL PRN
Status: DISCONTINUED | OUTPATIENT
Start: 2018-03-05 | End: 2018-03-05

## 2018-03-05 RX ORDER — DEXAMETHASONE SODIUM PHOSPHATE 4 MG/ML
4 INJECTION, SOLUTION INTRA-ARTICULAR; INTRALESIONAL; INTRAMUSCULAR; INTRAVENOUS; SOFT TISSUE EVERY 10 MIN PRN
Status: DISCONTINUED | OUTPATIENT
Start: 2018-03-05 | End: 2018-03-05 | Stop reason: HOSPADM

## 2018-03-05 RX ORDER — FLUMAZENIL 0.1 MG/ML
0.2 INJECTION, SOLUTION INTRAVENOUS
Status: DISCONTINUED | OUTPATIENT
Start: 2018-03-05 | End: 2018-03-05 | Stop reason: HOSPADM

## 2018-03-05 RX ORDER — HYDRALAZINE HYDROCHLORIDE 20 MG/ML
2.5-5 INJECTION INTRAMUSCULAR; INTRAVENOUS EVERY 10 MIN PRN
Status: DISCONTINUED | OUTPATIENT
Start: 2018-03-05 | End: 2018-03-05 | Stop reason: HOSPADM

## 2018-03-05 RX ORDER — LIDOCAINE 40 MG/G
CREAM TOPICAL
Status: DISCONTINUED | OUTPATIENT
Start: 2018-03-05 | End: 2018-03-05 | Stop reason: HOSPADM

## 2018-03-05 RX ORDER — ONDANSETRON 4 MG/1
4 TABLET, ORALLY DISINTEGRATING ORAL EVERY 30 MIN PRN
Status: DISCONTINUED | OUTPATIENT
Start: 2018-03-05 | End: 2018-03-05 | Stop reason: HOSPADM

## 2018-03-05 RX ORDER — FENTANYL CITRATE 50 UG/ML
INJECTION, SOLUTION INTRAMUSCULAR; INTRAVENOUS PRN
Status: DISCONTINUED | OUTPATIENT
Start: 2018-03-05 | End: 2018-03-05

## 2018-03-05 RX ORDER — OXYCODONE HYDROCHLORIDE 5 MG/1
5 TABLET ORAL EVERY 4 HOURS PRN
Status: DISCONTINUED | OUTPATIENT
Start: 2018-03-05 | End: 2018-03-05 | Stop reason: HOSPADM

## 2018-03-05 RX ORDER — ALBUTEROL SULFATE 0.83 MG/ML
2.5 SOLUTION RESPIRATORY (INHALATION) EVERY 4 HOURS PRN
Status: DISCONTINUED | OUTPATIENT
Start: 2018-03-05 | End: 2018-03-05 | Stop reason: HOSPADM

## 2018-03-05 RX ADMIN — GLYCOPYRROLATE 0.2 MG: 0.2 INJECTION, SOLUTION INTRAMUSCULAR; INTRAVENOUS at 08:53

## 2018-03-05 RX ADMIN — BENZOCAINE 2 SPRAY: 200 SPRAY DENTAL; ORAL; PERIODONTAL at 08:52

## 2018-03-05 RX ADMIN — MIDAZOLAM 2 MG: 1 INJECTION INTRAMUSCULAR; INTRAVENOUS at 08:48

## 2018-03-05 RX ADMIN — PROPOFOL 20 MG: 10 INJECTION, EMULSION INTRAVENOUS at 08:54

## 2018-03-05 RX ADMIN — PROPOFOL 20 MG: 10 INJECTION, EMULSION INTRAVENOUS at 08:56

## 2018-03-05 RX ADMIN — PROPOFOL 10 MG: 10 INJECTION, EMULSION INTRAVENOUS at 09:02

## 2018-03-05 RX ADMIN — SODIUM CHLORIDE, POTASSIUM CHLORIDE, SODIUM LACTATE AND CALCIUM CHLORIDE: 600; 310; 30; 20 INJECTION, SOLUTION INTRAVENOUS at 08:38

## 2018-03-05 RX ADMIN — PROPOFOL 10 MG: 10 INJECTION, EMULSION INTRAVENOUS at 08:58

## 2018-03-05 RX ADMIN — FENTANYL CITRATE 25 MCG: 50 INJECTION, SOLUTION INTRAMUSCULAR; INTRAVENOUS at 08:48

## 2018-03-05 RX ADMIN — LIDOCAINE HYDROCHLORIDE 40 MG: 20 INJECTION, SOLUTION INFILTRATION; PERINEURAL at 08:54

## 2018-03-05 RX ADMIN — PROPOFOL 20 MG: 10 INJECTION, EMULSION INTRAVENOUS at 09:00

## 2018-03-05 RX ADMIN — PROPOFOL 20 MG: 10 INJECTION, EMULSION INTRAVENOUS at 08:52

## 2018-03-05 NOTE — DISCHARGE INSTRUCTIONS
UPPER ENDOSCOPY    PURPOSE:  An Upper Endoscopy is a procedure in which the doctor passes a flexible, lighted tube called a gastroscope through your mouth into your stomach in order to:    See the lining of the esophagus, stomach, and duodenum (first part of the small intestine).    Look for bleeding, inflammation (swelling), abnormal tumors or tissue, or ulcers.    Take biopsy specimens.  (Biopsies do not hurt.)    TELL YOUR DOCTOR IF:     You have a heart condition, heart murmur, or have had heart valve surgery.    You have had angioplasty with stents put in within the last year.    You are taking blood thinners - Aspirin, Anti-inflammatory pain pills (like Motrin, ibuprofen, Naproxen, Feldene, Advil, etc.), Coumadin, Plavix.    You have diabetes - contact your regular doctor for management of your insulin.    YOU NEED TO:    Have someone drive you home.  You are not allowed to drive until the next day.  (If you take a taxi, the person staying with you after surgery must ride with you in the taxi.  The  is not responsible for you).    Have someone stay with you for four (4) hours after you leave the hospital.    Know the time to be at admitting:  A nurse will call you with the time the afternoon before your procedure.  If your procedure is on Monday, you will be called on Friday.  If you have not been contacted with the time, call the Admitting Department at [phone] or [phone], ext. [phone] after 5 pm (Admitting is open 24 hours daily).    Talk with the Surgery Patient Education Nurses.  Please call them @ [phone] or toll free [phone] after 8:00 a.m. Monday through Friday.    TO PREPARE FOR THE PROCEDURE:      ONE WEEK BEFORE:    Stop Aspirin, anti-inflammatory pain pills (Motrin, ibuprofen, Naproxen, Feldene, Advil, etc.).  It is OK to take Tylenol (acetaminophen).    Your doctor will tell you what to do if you are on Coumadin or Plavix.     NIGHT BEFORE:    Do not eat or drink  anything after midnight.  Your stomach needs to be empty.     DAY OF YOUR PROCEDURE:    Take your pills you were told to take.  Do not take diabetic pills.  If you are on Insulin, take the dose your doctor told you to take.    Wear loose, comfortable clothing and shoes.    Remove ALL jewelry including wedding rings.  Leave valuables at home.    Come to the hospital Admitting Department located at the [facility] on the lower level.    In Same Day surgery, you will be asked to change into an exam gown.    You will be asked your name, birth date, and what you are having done by every person who is involved with your care.    Your health history, medications, and allergies will be reviewed and verified.    An IV (intravenous line) will be started in your hand.  DURING THE UPPER ENDOSCOPY:    Your doctor and a registered nurse will be with you throughout the procedure which takes about 30 minutes.    You will either lie on your left side or on your back.    Medications will be given to you to help you relax and reduce the gag reflex when swallowing the scope.    Your doctor may need to insert air into your stomach to see better.  This may cause fullness or a cramping sensation.  The air will be removed at the end of the exam.    AFTER THE PROCEDURE    You will return to Same Day Surgery to rest for about an hour before you go home.    The doctor will talk with you and your family.    A family member/friend may visit with you.    You may burp up any air remaining in your stomach.    You may feel dizzy or light-headed from the medicine.    Your nurse will go over the discharge instructions with you and your caregiver and answer any of your questions.      You will be contacted the next day to check on how you are doing.    If biopsies were taken, you will be contacted with the results usually within 3 days.  BACK AT HOME    Rest for an hour or two after you get home.    When your throat is no longer numb and you have a  gag reflex, take a few sips of cool water.  If you can swallow comfortably, you may start eating again.    You may have a mild sore throat for the rest of the day.  WHAT TO WATCH FOR:  Problems rarely occur after the exam, but it is important to be aware of the early signs of a complication.  Call your doctor immediately if you have:    Difficulty swallowing or breathing    Unusual pain in your stomach or chest    Vomiting blood or dark material that looks like coffee grounds    Black or tarry stools    Temperature over 100.6 degrees    MORE QUESTIONS?  Please ask your doctor or nurse before the exam begins  or call your doctor at the clinic.    IF YOU MUST CANCEL YOUR PROCEDURE THE EVENING/NIGHT BEFORE, PLEASE CALL HOSPITAL ADMITTING -[phone] OR TOLL FREE [phone], EXT. [phone].    Phone Numbers:  Hospital - [phone] [phone]  [facility] - [phone]  Surgery Patient Education - [phone] or toll free [phone]      Post-Anesthesia Patient Instructions    IMMEDIATELY FOLLOWING SURGERY:  Do not drive or operate machinery for the first twenty four hours after surgery.  Do not make any important decisions for twenty four hours after surgery or while taking narcotic pain medications or sedatives.  If you develop intractable nausea and vomiting or a severe headache please notify your doctor immediately.    FOLLOW-UP:  Please make an appointment with your surgeon as instructed. You do not need to follow up with anesthesia unless specifically instructed to do so.    WOUND CARE INSTRUCTIONS (if applicable):  Keep a dry clean dressing on the anesthesia/puncture wound site if there is drainage.  Once the wound has quit draining you may leave it open to air.  Generally you should leave the bandage intact for twenty four hours unless there is drainage.  If the epidural site drains for more than 36-48 hours please call the anesthesia department.    QUESTIONS?:  Please feel free to call your  physician or the hospital  if you have any questions, and they will be happy to assist you.

## 2018-03-05 NOTE — ANESTHESIA CARE TRANSFER NOTE
Patient: Shweta Harris    Procedure(s):  UPPER ENDOSCOPY WITH BIOPSY - Wound Class: II-Clean Contaminated    Diagnosis: EPIGASTRIC PAIN  Diagnosis Additional Information: No value filed.    Anesthesia Type:   MAC     Note:  Airway :Nasal Cannula  Patient transferred to:Phase II  Handoff Report: Identifed the Patient, Identified the Reponsible Provider, Reviewed the pertinent medical history, Discussed the surgical course, Reviewed Intra-OP anesthesia mangement and issues during anesthesia, Set expectations for post-procedure period and Allowed opportunity for questions and acknowledgement of understanding      Vitals: (Last set prior to Anesthesia Care Transfer)    CRNA VITALS  3/5/2018 0838 - 3/5/2018 0912      3/5/2018             Resp Rate (set): 8                Electronically Signed By: BETO Sawant CRNA  March 5, 2018  9:12 AM

## 2018-03-05 NOTE — OR NURSING
Pateint discharged to home .  Justina score 20. Pain level 0/10.  Discharged from unit via walking .

## 2018-03-05 NOTE — OP NOTE
Shweta Harris MRN# [id number]   YOB: 1965 Age: 52 year old      Date of Admission:  3/5/2018    Primary care provider: Antionette Edward    PREOPERATIVE DIAGNOSIS:  Right upper quadrant pain      POSTOPERATIVE DIAGNOSES:    Normal EGD      PROCEDURE:  Esophagogastroduodenoscopy with cold forceps biopsies.       HISTORY OF PRESENT ILLNESS:  This 52 year old female developed post prandial RUQ pain with normal US evaluation of her gallbladder. She was recommended to undergo endoscopic evaluation.     OPERATIVE FINDINGS:  The gastroesophageal junction was noted 37cm from the incisors.  The Z line was regular with changes suggesting reflux.  There was no evidence of ulceration or stricture.      Specimen(s) submitted to pathology:  Antral biopsies, duodenal biopsies, duodenal bulb biopsies, GE junction biopsies.    PROCEDURE:  With the patient in the supine position on the transport cart, IV sedation was administered by the nurse anesthetist.  Her correct identity and planned procedure were confirmed during a requisite timeout pause.  A bite block was placed and the fiberoptic endoscope was introduced and negotiated through the cricopharyngeus without difficulty.  The length of the esophagus was examined without findings.  The gastroesophageal junction was noted 37 cm from the incisors; the Z line was regular without ulceration, bleeding source or stricture.  There was no  evidence of Bonds's change.  The stomach was entered and the pylorus was traversed easily.  The gastric mucosa was normal; there was no evidence of gastric polyp, ulcer or bleeding source.  The duodenum was similarly without finding.  Biopsies of the 2/3 portion of the duodenum was performed and then of the duodenal bulb. The endoscope was returned to the body of the stomach and retroflex confirmed the absence of abnormality in the cardia.      Random cold forceps biopsies were obtained of the antrum for H. pylori.  Hemostasis was  judged adequate on visualization.   The endoscope was returned to the GE junction.  Circumferential biopsies were obtained; hemostasis was satisfactory.  A second circumferential examination of the esophagus was performed on slow withdrawal of the endoscope without additional finding.  The procedure was satisfactorially tolerated; there was no appreciable blood loss and the patient was returned to Day Surgery in good condition.      Lorenzo Berry  3/5/2018  9:06 AM

## 2018-03-05 NOTE — IP AVS SNAPSHOT
[facility] Preop/Phase II    [address] [address]    Phone:  [phone]                                       After Visit Summary   3/5/2018    Shweta Harris    MRN: [id number]           After Visit Summary Signature Page     I have received my discharge instructions, and my questions have been answered. I have discussed any challenges I see with this plan with the nurse or doctor.    ..........................................................................................................................................  Patient/Patient Representative Signature      ..........................................................................................................................................  Patient Representative Print Name and Relationship to Patient    ..................................................               ................................................  Date                                            Time    ..........................................................................................................................................  Reviewed by Signature/Title    ...................................................              ..............................................  Date                                                            Time

## 2018-03-06 LAB — COPATH REPORT: NORMAL

## 2018-03-06 NOTE — ANESTHESIA POSTPROCEDURE EVALUATION
Patient: Shweta Harris    Procedure(s):  UPPER ENDOSCOPY WITH BIOPSY - Wound Class: II-Clean Contaminated    Diagnosis:EPIGASTRIC PAIN  Diagnosis Additional Information: No value filed.    Anesthesia Type:  MAC    Note:  Anesthesia Post Evaluation    Patient location during evaluation: Bedside  Patient participation: Able to fully participate in evaluation  Level of consciousness: awake  Pain management: adequate  Airway patency: patent  Cardiovascular status: acceptable  Respiratory status: acceptable  Hydration status: acceptable  PONV: none     Anesthetic complications: None          Last vitals:  Vitals:    03/05/18 0930 03/05/18 0935 03/05/18 0940   BP: 116/85 116/84    Temp:      SpO2: 97% 99% 98%         Electronically Signed By: BETO Sawant CRNA  March 6, 2018  9:09 AM

## 2018-03-08 ENCOUNTER — HOSPITAL ENCOUNTER (OUTPATIENT)
Dept: PHYSICAL THERAPY | Facility: HOSPITAL | Age: 53
Setting detail: THERAPIES SERIES
End: 2018-03-08
Attending: PHYSICIAN ASSISTANT
Payer: COMMERCIAL

## 2018-03-08 PROCEDURE — 40000718 ZZHC STATISTIC PT DEPARTMENT ORTHO VISIT

## 2018-03-08 PROCEDURE — 97110 THERAPEUTIC EXERCISES: CPT | Mod: GP

## 2018-03-08 PROCEDURE — 97140 MANUAL THERAPY 1/> REGIONS: CPT | Mod: GP

## 2018-03-12 ENCOUNTER — HOSPITAL ENCOUNTER (OUTPATIENT)
Dept: NUCLEAR MEDICINE | Facility: HOSPITAL | Age: 53
Discharge: HOME | End: 2018-03-12
Attending: SURGERY | Admitting: SURGERY
Payer: COMMERCIAL

## 2018-03-12 ENCOUNTER — HOSPITAL ENCOUNTER (OUTPATIENT)
Dept: NUCLEAR MEDICINE | Facility: HOSPITAL | Age: 53
End: 2018-03-12
Attending: SURGERY
Payer: COMMERCIAL

## 2018-03-12 ENCOUNTER — HOSPITAL ENCOUNTER (OUTPATIENT)
Dept: PHYSICAL THERAPY | Facility: HOSPITAL | Age: 53
Setting detail: THERAPIES SERIES
End: 2018-03-12
Attending: PHYSICIAN ASSISTANT
Payer: COMMERCIAL

## 2018-03-12 ENCOUNTER — OFFICE VISIT (OUTPATIENT)
Dept: FAMILY MEDICINE | Facility: OTHER | Age: 53
End: 2018-03-12
Attending: PHYSICIAN ASSISTANT
Payer: COMMERCIAL

## 2018-03-12 VITALS
HEART RATE: 67 BPM | BODY MASS INDEX: 30.49 KG/M2 | OXYGEN SATURATION: 98 % | HEIGHT: 68 IN | TEMPERATURE: 98 F | WEIGHT: 201.2 LBS | DIASTOLIC BLOOD PRESSURE: 70 MMHG | SYSTOLIC BLOOD PRESSURE: 90 MMHG

## 2018-03-12 DIAGNOSIS — G47.00 PERSISTENT DISORDER OF INITIATING OR MAINTAINING SLEEP: Primary | ICD-10-CM

## 2018-03-12 DIAGNOSIS — Z12.31 ENCOUNTER FOR SCREENING MAMMOGRAM FOR BREAST CANCER: ICD-10-CM

## 2018-03-12 DIAGNOSIS — R07.89 OTHER CHEST PAIN: ICD-10-CM

## 2018-03-12 DIAGNOSIS — R10.13 EPIGASTRIC PAIN: ICD-10-CM

## 2018-03-12 PROCEDURE — A9537 TC99M MEBROFENIN: HCPCS | Performed by: RADIOLOGY

## 2018-03-12 PROCEDURE — 78227 HEPATOBIL SYST IMAGE W/DRUG: CPT | Mod: TC

## 2018-03-12 PROCEDURE — 99214 OFFICE O/P EST MOD 30 MIN: CPT | Performed by: PHYSICIAN ASSISTANT

## 2018-03-12 PROCEDURE — 34300033 ZZH RX 343: Performed by: RADIOLOGY

## 2018-03-12 PROCEDURE — 97140 MANUAL THERAPY 1/> REGIONS: CPT | Mod: GP | Performed by: PHYSICAL THERAPIST

## 2018-03-12 PROCEDURE — 97110 THERAPEUTIC EXERCISES: CPT | Mod: GP | Performed by: PHYSICAL THERAPIST

## 2018-03-12 RX ORDER — PANTOPRAZOLE SODIUM 20 MG/1
TABLET, DELAYED RELEASE ORAL
Qty: 30 TABLET | Refills: 0 | Status: SHIPPED | OUTPATIENT
Start: 2018-03-12 | End: 2018-04-04

## 2018-03-12 RX ORDER — ZOLPIDEM TARTRATE 10 MG/1
10 TABLET ORAL
Qty: 30 TABLET | Refills: 5 | Status: SHIPPED | OUTPATIENT
Start: 2018-03-12 | End: 2018-07-12

## 2018-03-12 RX ORDER — KIT FOR THE PREPARATION OF TECHNETIUM TC 99M MEBROFENIN 45 MG/10ML
5 INJECTION, POWDER, LYOPHILIZED, FOR SOLUTION INTRAVENOUS ONCE
Status: COMPLETED | OUTPATIENT
Start: 2018-03-12 | End: 2018-03-12

## 2018-03-12 RX ADMIN — MEBROFENIN 5 MILLICURIE: 45 INJECTION, POWDER, LYOPHILIZED, FOR SOLUTION INTRAVENOUS at 12:00

## 2018-03-12 ASSESSMENT — ANXIETY QUESTIONNAIRES
5. BEING SO RESTLESS THAT IT IS HARD TO SIT STILL: NOT AT ALL
1. FEELING NERVOUS, ANXIOUS, OR ON EDGE: NOT AT ALL
IF YOU CHECKED OFF ANY PROBLEMS ON THIS QUESTIONNAIRE, HOW DIFFICULT HAVE THESE PROBLEMS MADE IT FOR YOU TO DO YOUR WORK, TAKE CARE OF THINGS AT HOME, OR GET ALONG WITH OTHER PEOPLE: NOT DIFFICULT AT ALL
3. WORRYING TOO MUCH ABOUT DIFFERENT THINGS: NOT AT ALL
6. BECOMING EASILY ANNOYED OR IRRITABLE: NOT AT ALL
2. NOT BEING ABLE TO STOP OR CONTROL WORRYING: NOT AT ALL
4. TROUBLE RELAXING: SEVERAL DAYS
GAD7 TOTAL SCORE: 1
7. FEELING AFRAID AS IF SOMETHING AWFUL MIGHT HAPPEN: NOT AT ALL

## 2018-03-12 ASSESSMENT — PAIN SCALES - GENERAL: PAINLEVEL: NO PAIN (0)

## 2018-03-12 NOTE — PROGRESS NOTES
Outpatient Physical Therapy Progress Note     Patient: Shweta Harris  : 1965    Beginning/End Dates of Reporting Period:  2018 to 3/12/2018    Referring Provider: Antionette Pedraza Diagnosis: Neck pain, stress reaction     Client Self Report:  Patient has been seen for 4 PT sessions. She states that the tingling in her hands and arms has improved, her neck is still somewhat sore, but she has been doing the exercises as prescribed and feels they are going alright. She feels better when she leaves PT. She continues to deal with stressful situations.     Objective Measurements: Cervical ROM is full with only mild pull in neck with B ROTs.  Thoracic hypomobility appreciated today at T1-T7 - improved following manual techniques. Less tightness in B UTs and less tingling appreciated. Patient still needs to work on postural strengthening due to the physical nature of her jobs. She is about 40% improved.         Goals:  Goal Identifier STG 1   Goal Description Patient will report decreased worst PL in L neck and upper back to 4/10 or less in order to perform all home and work tasks   Target Date 18   Date Met      Progress: Improving     Goal Identifier LTG 1   Goal Description Patient will report decreased worst PL in L neck and upper back to 2/10 or less in order to perform all home and work tasks   Target Date 18   Date Met      Progress: Improving     Goal Identifier LTG 2   Goal Description Patient will report overall 70% or better improvement in her L neck and upper back pain in order to perform home and work tasks   Target Date 18   Date Met      Progress:Improving - 40%       Progress Toward Goals:   Progress this reporting period: Patient has made some progress, but her stress has not changed much and this is still a contributing factor      Plan:  Continue therapy per current plan of care. Extend therapy for 1x/ week for up to 4 additional sessions.    Discharge:  No

## 2018-03-12 NOTE — PROCEDURES
Patient was given 16 oz of vanilla boost (28 grams of fat) by mouth for a nuclear medicine HIDA scan with EF. No complications.

## 2018-03-12 NOTE — NURSING NOTE
"Chief Complaint   Patient presents with     Abdominal Pain       Initial BP 90/70  Pulse 67  Temp 98  F (36.7  C)  Ht 5' 8\" (1.727 m)  Wt 201 lb 3.2 oz (91.3 kg)  LMP 07/12/2017  SpO2 98%  BMI 30.59 kg/m2 Estimated body mass index is 30.59 kg/(m^2) as calculated from the following:    Height as of this encounter: 5' 8\" (1.727 m).    Weight as of this encounter: 201 lb 3.2 oz (91.3 kg).  Medication Reconciliation: complete   [facility]   Medical Assistant      "

## 2018-03-12 NOTE — PATIENT INSTRUCTIONS
Thank you for choosing [facility].   I have office hours 8:00 am to 4:30 pm on Monday's, Wednesday's, Thursday's and Friday's. My nurse and I are out of the office every Tuesday.    Following your visit, when your labs and diagnostic testing have returned, I will review then and you will be contacted by my nurse.  If you are on My Chart, you can also view results there.    For refills, notify your pharmacy regarding what you need and the pharmacy will generate a refill request. Do not call my nurse as she is unable to process refill request. Please plan ahead and allow 3-5 days for refill requests.    You will generally receive a reminder call the day prior to your appointment.  If you cannot attend your appointment, please cancel your appointment with as much notice as possible.  If there is a pattern of failure to present for your appointments, I cannot provide consistent, meaningful, ongoing care for you. It is very important to me that you come in for your care, so we can best assist you with your health care needs.    IMPORTANT:  Please note that it is my standard of practice to NOT participate in prescribing ongoing requested Narcotic Analgesic therapy, and/or participate in the prescribing of other controlled substances.  My nurse and I am happy to assist you with the process of referral for alternative pain management as needed, and other treatment modalities including but not limited to:  Physical Therapy, Physical Medicine and Rehab, Counseling, Chiropractic Care, Orthopedic Care, and non-narcotic medication management.     In the event that you need to be seen for emergent concerns and I am out of office,  please see one of my colleagues for acute concerns.  You may also present to  or ER.  I appreciate the opportunity to serve you and look forward to supporting your healthcare needs in the future. Please contact me with any questions or concerns that you may  have.    Sincerely,      Antionette Edward RN, PA-C

## 2018-03-12 NOTE — MR AVS SNAPSHOT
After Visit Summary   3/12/2018    Shweta Harris    MRN: [id number]           Patient Information     Date Of Birth          1965        Visit Information        Provider Department      3/12/2018 9:30 AM Antionette Edward PA [facility]        Today's Diagnoses     Persistent disorder of initiating or maintaining sleep    -  1    Other chest pain        Encounter for screening mammogram for breast cancer          Care Instructions      Thank you for choosing [facility].   I have office hours 8:00 am to 4:30 pm on Monday's, Wednesday's, Thursday's and Friday's. My nurse and I are out of the office every Tuesday.    Following your visit, when your labs and diagnostic testing have returned, I will review then and you will be contacted by my nurse.  If you are on My Chart, you can also view results there.    For refills, notify your pharmacy regarding what you need and the pharmacy will generate a refill request. Do not call my nurse as she is unable to process refill request. Please plan ahead and allow 3-5 days for refill requests.    You will generally receive a reminder call the day prior to your appointment.  If you cannot attend your appointment, please cancel your appointment with as much notice as possible.  If there is a pattern of failure to present for your appointments, I cannot provide consistent, meaningful, ongoing care for you. It is very important to me that you come in for your care, so we can best assist you with your health care needs.    IMPORTANT:  Please note that it is my standard of practice to NOT participate in prescribing ongoing requested Narcotic Analgesic therapy, and/or participate in the prescribing of other controlled substances.  My nurse and I am happy to assist you with the process of referral for alternative pain management as needed, and other treatment modalities including but not limited to:  Physical Therapy, Physical Medicine  and Rehab, Counseling, Chiropractic Care, Orthopedic Care, and non-narcotic medication management.     In the event that you need to be seen for emergent concerns and I am out of office,  please see one of my colleagues for acute concerns.  You may also present to UC or ER.  I appreciate the opportunity to serve you and look forward to supporting your healthcare needs in the future. Please contact me with any questions or concerns that you may have.    Sincerely,      Antionette Edward RN, PA-C               Follow-ups after your visit        Your next 10 appointments already scheduled     Mar 12, 2018 12:00 PM CDT   (Arrive by 11:45 AM)   NM INJECTION with [id number]   [facility] ([facility] )    [address] [id number] [phone]            Mar 12, 2018 12:15 PM CDT   (Arrive by 12:00 PM)   NM HEPATOBILIARY SCAN W GB EF with [id number]   [facility] ([facility] )    [address] [id number] [phone]           Please bring a list of your medicines to the exam. (Include vitamins, minerals and over-the-counter drugs.) You should wear comfortable clothes. Leave your valuables at home. Please bring related prior results and films.  Tell your doctor:   If you are breastfeeding or may be pregnant.   If you have had a test including barium within the past 48 hours. Barium may change the results of certain exams.   If you think you may need sedation (medicine to help you relax).  4 hours before your exam: stop drinking and eating. Stop all morphine or morphine derivatives.  Please call your Imaging Department at your exam site with any questions.            Mar 19, 2018  8:30 AM CDT   Treatment with Delia Vasquez, PT   [facility] ([facility] )    [address] [id number] [phone]              Future tests that were ordered for you today     Open Future Orders        Priority Expected Expires  "Ordered    MA Screening Digital Bilateral Routine  3/12/2019 3/12/2018            Who to contact     If you have questions or need follow up information about today's clinic visit or your schedule please contact [facility] [place] directly at [phone].  Normal or non-critical lab and imaging results will be communicated to you by Nephosityhart, letter or phone within 4 business days after the clinic has received the results. If you do not hear from us within 7 days, please contact the clinic through Nephosityhart or phone. If you have a critical or abnormal lab result, we will notify you by phone as soon as possible.  Submit refill requests through TreSensa or call your pharmacy and they will forward the refill request to us. Please allow 3 business days for your refill to be completed.          Additional Information About Your Visit        TreSensa Information     TreSensa gives you secure access to your electronic health record. If you see a primary care provider, you can also send messages to your care team and make appointments. If you have questions, please call your primary care clinic.  If you do not have a primary care provider, please call [phone] and they will assist you.        Care EveryWhere ID     This is your Care EveryWhere ID. This could be used by other organizations to access your [facility] medical records  [id number]        Your Vitals Were     Pulse Temperature Height Last Period Pulse Oximetry BMI (Body Mass Index)    67 98  F (36.7  C) 5' 8\" (1.727 m) 07/12/2017 98% 30.59 kg/m2       Blood Pressure from Last 3 Encounters:   03/12/18 90/70   03/05/18 116/84   02/21/18 108/68    Weight from Last 3 Encounters:   03/12/18 201 lb 3.2 oz (91.3 kg)   03/05/18 204 lb 12.8 oz (92.9 kg)   02/21/18 208 lb (94.3 kg)                 Today's Medication Changes          These changes are accurate as of 3/12/18 10:28 AM.  If you have any questions, ask your nurse or doctor.               Start taking these " medicines.        Dose/Directions    pantoprazole 20 MG EC tablet   Commonly known as:  PROTONIX   Used for:  Other chest pain   Started by:  Antionette Edward PA        Take by mouth 30-60 minutes before a meal.   Quantity:  30 tablet   Refills:  0       zolpidem 10 MG tablet   Commonly known as:  AMBIEN   Used for:  Persistent disorder of initiating or maintaining sleep   Replaces:  zolpidem 6.25 MG CR tablet   Started by:  Antionette Edward PA        Dose:  10 mg   Take 1 tablet (10 mg) by mouth nightly as needed for sleep   Quantity:  30 tablet   Refills:  5         Stop taking these medicines if you haven't already. Please contact your care team if you have questions.     zolpidem 6.25 MG CR tablet   Commonly known as:  AMBIEN CR   Replaced by:  zolpidem 10 MG tablet   Stopped by:  Antionette Edward PA                Where to get your medicines      These medications were sent to [facility] - [address]  [address] [facility] [place] [address]     Phone:  [phone]     pantoprazole 20 MG EC tablet         Some of these will need a paper prescription and others can be bought over the counter.  Ask your nurse if you have questions.     Bring a paper prescription for each of these medications     zolpidem 10 MG tablet                Primary Care Provider Office Phone # Fax #    DESTIN Zuleta [phone]       [facility] [address]  [place] [address]        Equal Access to Services     [facility] AH: Hadii aad ku hadasho Soomaali, waaxda luqadaha, qaybta kaalmada adeegyada, waxay idiin hayaan stephanie chaves lagian ah. So [facility] [phone].    ATENCIÓN: Si habla español, tiene a boone disposición servicios gratuitos de asistencia lingüística. Llame al [phone].    We comply with applicable federal civil rights laws and [place] laws. We do not discriminate on the basis of race, color, national origin, age, disability, sex, sexual  orientation, or gender identity.            Thank you!     Thank you for choosing [facility]  for your [facility]. Our goal is always to provide you with excellent care. Hearing back from our patients is one way we can continue to improve our services. Please take a few minutes to complete the written survey that you may receive in the mail after your visit with us. Thank you!             Your Updated Medication List - Protect others around you: Learn how to safely use, store and throw away your medicines at www.disposemymeds.org.          This list is accurate as of 3/12/18 10:28 AM.  Always use your most recent med list.                   Brand Name Dispense Instructions for use Diagnosis    cyclobenzaprine 5 MG tablet    FLEXERIL    42 tablet    Take 1 tablet (5 mg) by mouth 3 times daily as needed for muscle spasms    Cervical paraspinal muscle spasm       escitalopram 10 MG tablet    LEXAPRO    30 tablet    Take 1 tablet (10 mg) by mouth daily    Cervical paraspinal muscle spasm       loratadine 10 MG tablet    CLARITIN     Take 10 mg by mouth as needed        pantoprazole 20 MG EC tablet    PROTONIX    30 tablet    Take by mouth 30-60 minutes before a meal.    Other chest pain       zolpidem 10 MG tablet    AMBIEN    30 tablet    Take 1 tablet (10 mg) by mouth nightly as needed for sleep    Persistent disorder of initiating or maintaining sleep

## 2018-03-12 NOTE — PROGRESS NOTES
SUBJECTIVE:   Shweta Harris is a 52 year old female who presents to clinic today for the following health issues:      Abdominal Pain      Duration: Few Months    Description (location/character/radiation): Abd Lower Pelvic Area       Associated flank pain: None    Intensity:  Not pain today.only after eating    Accompanying signs and symptoms:        Fever/Chills: YES       Gas/Bloating: YES- Gas random       Nausea/vomitting: YES- Nausea       Diarrhea: no        Dysuria or Hematuria: no     History (previous similar pain/trauma/previous testing): None    Precipitating or alleviating factors:       Pain worse with eating/BM/urination: Just after Eating       Pain relieved by BM: no     Therapies tried and outcome: None    LMP:  Hysterectomy          Problem list and histories reviewed & adjusted, as indicated.  Additional history: as documented    Patient Active Problem List   Diagnosis     Heterozygous factor V Leiden mutation (H)     ACP (advance care planning)     Endometriosis of pelvis     Adenomyosis     Past Surgical History:   Procedure Laterality Date     COLONOSCOPY N/A 12/1/2014    Procedure: COLONOSCOPY;  Surgeon: Blanche Trivedi MD;  [facility]     CYSTOSCOPY N/A 8/23/2017    Procedure: CYSTOSCOPY;;  Surgeon: Santhosh May MD;  [facility]     ESOPHAGOSCOPY, GASTROSCOPY, DUODENOSCOPY (EGD), COMBINED N/A 3/5/2018    Procedure: COMBINED ESOPHAGOSCOPY, GASTROSCOPY, DUODENOSCOPY (EGD);  UPPER ENDOSCOPY WITH BIOPSY;  Surgeon: Lorenzo Berry MD;  Location: [facility]     GYN SURGERY       HYSTERECTOMY TOTAL ABDOMINAL  8/23/2017    Procedure: HYSTERECTOMY TOTAL ABDOMINAL;;  Surgeon: Santhosh May MD;  Location: [facility]     LAPAROSCOPIC ASSISTED HYSTERECTOMY VAGINAL, BILATERAL SALPINGO-OOPHORECTOMY, COMBINED N/A 8/23/2017    Procedure: COMBINED LAPAROSCOPIC ASSISTED HYSTERECTOMY VAGINAL, SALPINGO-OOPHORECTOMY;  LAPAROSCOPIC ASSISTED VAGINAL HYSTERECTOMY, BILATERAL SALPINGO-OOPHORECTOMY  ATTEMPTED, total abdominal hysterctomy with bilateral salpingo-oopherectomy, cystoscopy;  Surgeon: Santhosh May MD;  Location: [facility]     tubal ligation  1991       Social History   Substance Use Topics     Smoking status: Never Smoker     Smokeless tobacco: Never Used      Comment: No passive exposure     Alcohol use Yes      Comment: Occasionally     Family History   Problem Relation Age of Onset     Asthma Father      Other - See Comments Father      Factor V Leiden ([place])     Heart Surgery Father 79     2 stents put in his heart.     Alzheimer Disease Mother      Lewie Body Dementia     Breast Cancer Mother      DIABETES Paternal Grandmother      DIABETES Maternal Grandmother      Other - See Comments Other      Niece, Factor V Leiden     Other - See Comments Daughter      Factor V Leiden     Other - See Comments Other      Nephew, Factor V Leiden, no family hx     Other - See Comments Sister      Factor V Leiden         Current Outpatient Prescriptions   Medication Sig Dispense Refill     zolpidem (AMBIEN CR) 6.25 MG CR tablet TAKE 1 TABLET BY MOUTH NIGHTLY AS NEEDED FOR SLEEP 30 tablet 0     cyclobenzaprine (FLEXERIL) 5 MG tablet Take 1 tablet (5 mg) by mouth 3 times daily as needed for muscle spasms 42 tablet 1     escitalopram (LEXAPRO) 10 MG tablet Take 1 tablet (10 mg) by mouth daily 30 tablet 1     loratadine (CLARITIN) 10 MG tablet Take 10 mg by mouth as needed        Allergies   Allergen Reactions     Seasonal Allergies      Recent Labs   Lab Test  02/11/18   1120  02/05/18   0842   06/21/17   1020   LDL   --    --    --   94   HDL   --    --    --   67   TRIG   --    --    --   45   ALT  46  47   --   33   CR  0.77  0.82   < >  0.78   GFRESTIMATED  78  73   < >  77   GFRESTBLACK  >90  88   < >  >90   GFR Calc     POTASSIUM  4.1  4.3   < >  3.9   TSH   --    --    --   2.31    < > = values in this interval not displayed.      BP Readings from Last 3 Encounters:   03/12/18 90/70  "  03/05/18 116/84   02/21/18 108/68    Wt Readings from Last 3 Encounters:   03/12/18 201 lb 3.2 oz (91.3 kg)   03/05/18 204 lb 12.8 oz (92.9 kg)   02/21/18 208 lb (94.3 kg)                    Reviewed and updated as needed this visit by clinical staff  Tobacco  Allergies  Meds  Med Hx  Surg Hx  Fam Hx  Soc Hx      Reviewed and updated as needed this visit by Provider         ROS:  Constitutional, neuro, ENT, endocrine, pulmonary, cardiac, gastrointestinal, genitourinary, musculoskeletal, integument and psychiatric systems are negative, except as otherwise noted.    OBJECTIVE:                                                    BP 90/70  Pulse 67  Temp 98  F (36.7  C)  Ht 5' 8\" (1.727 m)  Wt 201 lb 3.2 oz (91.3 kg)  LMP 07/12/2017  SpO2 98%  BMI 30.59 kg/m2  Body mass index is 30.59 kg/(m^2).  GENERAL APPEARANCE: healthy, alert and no distress  EYES: Eyes grossly normal to inspection, PERRL and conjunctivae and sclerae normal  HENT: ear canals and TM's normal and nose and mouth without ulcers or lesions  NECK: no adenopathy, no asymmetry, masses, or scars and thyroid normal to palpation  RESP: lungs clear to auscultation - no rales, rhonchi or wheezes  CV: regular rates and rhythm, normal S1 S2, no S3 or S4 and no murmur, click or rub  LYMPHATICS: no cervical adenopathy  ABDOMEN: soft, nontender, without hepatosplenomegaly or masses and bowel sounds normal  MS: extremities normal- no gross deformities noted  SKIN: no suspicious lesions or rashes  NEURO: Normal strength and tone, mentation intact and speech normal  PSYCH: mentation appears normal and affect normal/bright    Diagnostic test results:  Diagnostic Test Results:  No results found for this or any previous visit (from the past 24 hour(s)).  Results for orders placed or performed during the hospital encounter of 03/05/18   Surgical pathology exam   Result Value Ref Range    Copath Report       Patient Name: STEVE CRUM  MR#: " [id number]  Specimen #:   Collected: 3/5/2018  Received: 3/5/2018  Reported: 3/6/2018 14:22  Ordering Phy(s): GAGE JADE  Additional Phy(s): SURJIT ALY    For improved result formatting, select 'View Enhanced Report Format' under   Linked Documents section.    SPECIMEN(S):  A: Duodenal biopsy  B: Duodenal biopsy, bulb  C: Stomach biopsy, antrum  D: Gastroesophageal junction biopsy    FINAL DIAGNOSIS:  A: Small bowel, duodenum, biopsy  - No pathologic diagnosis    B: Small bowel, duodenal bulb, biopsy  - No pathologic diagnosis    C: Stomach, antrum, biopsy  - No pathologic diagnosis    D: Gastroesophageal junction, biopsy  - No pathologic diagnosis    I have personally reviewed all specimens and/or slides, including the   listed special stains, and used them  with my medical judgement to determine or confirm the final diagnosis.    Electronically signed out by:    Eduardo Phoenix M.D.    CLINICAL HISTORY:  Epigastric pain; uppe r endoscopy with biopsy.    GROSS:  A: There are two pieces of tan soft tissue which are 1 and 3 mm. (2 TE in   1 block).    B: There is an irregular 4 mm piece of tan soft tissue. (1 TE in 1 block).    C: There are three pieces of tan soft tissue which vary from 3-6 mm. (3 TE   in 1 block).    D: There are two pieces of translucent tan soft tissue which are each 3   mm. (2 TE in 1 block). Dictated by:  Eduardo Phoenix MD 3/5/2018 02:52 PM)    MICROSCOPIC:  A: Microscopic sections show unremarkable small bowel.    B: Microscopic sections show unremarkable duodenum.    C: Microscopic sections show unremarkable gastric antrum.    D: Microscopic sections show unremarkable squamous mucosa.    CPT Codes  A: 91620-QZ5  B: 96729-QK7  C: 28508-CG5  D: 42824-YP8    TESTING LAB LOCATION:  [address] [address]  [phone]    COLLECTION SITE:  Client: [facility]  Location: [facility] (B)            ASSESSMENT/PLAN:                                                       1. Persistent disorder of initiating or maintaining sleep  Medication given not helping.  Will now try Ambien.  Can't stay sleeping.  Will  Let us know if not better.  Has addressed stressful situation   - zolpidem (AMBIEN) 10 MG tablet; Take 1 tablet (10 mg) by mouth nightly as needed for sleep  Dispense: 30 tablet; Refill: 5    2. Other chest pain  Given below.  See if helpful. Nothing noted on her bx. Seems to help the heartburn.   - pantoprazole (PROTONIX) 20 MG EC tablet; Take by mouth 30-60 minutes before a meal.  Dispense: 30 tablet; Refill: 0    3. Encounter for screening mammogram for breast cancer  Due for this.  Will get this done.     - MA Screen Bilateral w/Sincere; Future    See Patient Instructions    DESTIN [place]  [facility]

## 2018-03-13 ASSESSMENT — ANXIETY QUESTIONNAIRES: GAD7 TOTAL SCORE: 1

## 2018-03-13 ASSESSMENT — PATIENT HEALTH QUESTIONNAIRE - PHQ9: SUM OF ALL RESPONSES TO PHQ QUESTIONS 1-9: 1

## 2018-03-14 DIAGNOSIS — R10.11 ABDOMINAL PAIN, RIGHT UPPER QUADRANT: Primary | ICD-10-CM

## 2018-03-19 ENCOUNTER — HOSPITAL ENCOUNTER (OUTPATIENT)
Dept: PHYSICAL THERAPY | Facility: HOSPITAL | Age: 53
Setting detail: THERAPIES SERIES
End: 2018-03-19
Attending: PHYSICIAN ASSISTANT
Payer: COMMERCIAL

## 2018-03-19 ENCOUNTER — RADIANT APPOINTMENT (OUTPATIENT)
Dept: MAMMOGRAPHY | Facility: OTHER | Age: 53
End: 2018-03-19
Attending: PHYSICIAN ASSISTANT
Payer: COMMERCIAL

## 2018-03-19 DIAGNOSIS — Z12.31 ENCOUNTER FOR SCREENING MAMMOGRAM FOR BREAST CANCER: ICD-10-CM

## 2018-03-19 PROCEDURE — 97140 MANUAL THERAPY 1/> REGIONS: CPT | Mod: GP

## 2018-03-19 PROCEDURE — 40000718 ZZHC STATISTIC PT DEPARTMENT ORTHO VISIT

## 2018-03-19 PROCEDURE — 77067 SCR MAMMO BI INCL CAD: CPT | Mod: TC

## 2018-03-19 PROCEDURE — 97110 THERAPEUTIC EXERCISES: CPT | Mod: GP

## 2018-03-26 ENCOUNTER — HOSPITAL ENCOUNTER (OUTPATIENT)
Dept: PHYSICAL THERAPY | Facility: HOSPITAL | Age: 53
Setting detail: THERAPIES SERIES
End: 2018-03-26
Attending: PHYSICIAN ASSISTANT
Payer: COMMERCIAL

## 2018-03-26 PROCEDURE — G8982 BODY POS GOAL STATUS: HCPCS | Mod: GP,CH | Performed by: PHYSICAL THERAPIST

## 2018-03-26 PROCEDURE — 97140 MANUAL THERAPY 1/> REGIONS: CPT | Mod: GP | Performed by: PHYSICAL THERAPIST

## 2018-03-26 PROCEDURE — G8983 BODY POS D/C STATUS: HCPCS | Mod: GP,CH | Performed by: PHYSICAL THERAPIST

## 2018-03-26 PROCEDURE — 97110 THERAPEUTIC EXERCISES: CPT | Mod: GP | Performed by: PHYSICAL THERAPIST

## 2018-03-26 NOTE — PROGRESS NOTES
Discharge Summary    Patient has met all PT goals and is ready to DC.  Delia Vasquez DPT, NIKOLAY     03/26/18 0900   Signing Clinician's Name / Credentials   Signing clinician's name / credentials Delia Pedro DPRACHEL, OCS   Session Number   Session Number 6   Progress Note/Recertification   Progress Note Completed Date 03/26/18   Adult Goals   PT Ortho Eval Goals 1;2;3   Ortho Goal 1   Goal Identifier STG 1   Goal Description Patient will report decreased worst PL in L neck and upper back to 4/10 or less in order to perform all home and work tasks   Target Date 03/12/18   Date Met 03/26/18   Ortho Goal 2   Goal Identifier LTG 1   Goal Description Patient will report decreased worst PL in L neck and upper back to 2/10 or less in order to perform all home and work tasks   Target Date 04/02/18   Date Met 03/26/18   Ortho Goal 3   Goal Identifier LTG 2   Goal Description Patient will report overall 70% or better improvement in her L neck and upper back pain in order to perform home and work tasks   Target Date 04/02/18   Date Met 03/26/18   Subjective Report   Subjective Report States that she feels much better - 75-80% improved since starting PT. She feels comfortable continuing her HEP on her own and will return if she has difficulty   Objective Measures   Objective Measures Objective Measure 1   Objective Measure 1   Objective Measure ROM and progression   Details Full cervical ROM all planes. Less muscle guarding appreciated today. Progressed to include additional scap and postural strengthening exercises for her to complete at home. She is ready to DC   Treatment Interventions   Interventions Therapeutic Procedure/Exercise;Manual Therapy   Therapeutic Procedure/exercise   Minutes 8   Skilled Intervention ther ex   Patient Response good   Treatment Detail Chin tucks, scap sets, pec doorway stretch, lat pull downs, rows, chest press with GTB for scap mobility and decr UT usage, thoracic EXT - reviewed HEP and  patient performed with good form   Manual Therapy   Minutes 15   Skilled Intervention STM   Patient Response good   Treatment Detail Seated STM and MFR to L UT - improved overall - progressive stretch held to tolerance   Assessments Completed   Assessments Completed Patient is ready to DC   Plan   Home program Continue HEP   Plan for next session DC   Total Session Time   Timed Code Treatment Minutes 23   Total Treatment Time (sum of timed and untimed services) 23

## 2018-04-04 ENCOUNTER — OFFICE VISIT (OUTPATIENT)
Dept: SURGERY | Facility: OTHER | Age: 53
End: 2018-04-04
Attending: SURGERY
Payer: COMMERCIAL

## 2018-04-04 VITALS
HEIGHT: 68 IN | OXYGEN SATURATION: 100 % | SYSTOLIC BLOOD PRESSURE: 100 MMHG | WEIGHT: 201 LBS | TEMPERATURE: 98.2 F | DIASTOLIC BLOOD PRESSURE: 68 MMHG | HEART RATE: 74 BPM | BODY MASS INDEX: 30.46 KG/M2

## 2018-04-04 DIAGNOSIS — R07.89 OTHER CHEST PAIN: ICD-10-CM

## 2018-04-04 DIAGNOSIS — K21.00 GASTROESOPHAGEAL REFLUX DISEASE WITH ESOPHAGITIS: Primary | ICD-10-CM

## 2018-04-04 PROCEDURE — 99214 OFFICE O/P EST MOD 30 MIN: CPT | Performed by: SURGERY

## 2018-04-04 RX ORDER — SUCRALFATE ORAL 1 G/10ML
1 SUSPENSION ORAL 4 TIMES DAILY
Qty: 420 ML | Refills: 1 | Status: SHIPPED | OUTPATIENT
Start: 2018-04-04 | End: 2018-07-12

## 2018-04-04 RX ORDER — PANTOPRAZOLE SODIUM 20 MG/1
TABLET, DELAYED RELEASE ORAL
Qty: 30 TABLET | Refills: 5 | Status: SHIPPED | OUTPATIENT
Start: 2018-04-04 | End: 2018-04-19

## 2018-04-04 ASSESSMENT — PAIN SCALES - GENERAL: PAINLEVEL: NO PAIN (0)

## 2018-04-04 NOTE — MR AVS SNAPSHOT
After Visit Summary   4/4/2018    Shweta Harris    MRN: [id number]           Patient Information     Date Of Birth          1965        Visit Information        Provider Department      4/4/2018 2:30 PM Lorenzo Berry MD [facility]        Today's Diagnoses     Gastroesophageal reflux disease with esophagitis    -  1      Care Instructions    Thank you for allowing Dr. Berry and our surgical team to participate in your care. Please call with any scheduling questions or concerns to Krysta at [phone] or for nursing questions to  [phone]  You have an order placed with [facility]. They will call you to set up an appointment for your test specified by Dr. Berry.              Follow-ups after your visit        Future tests that were ordered for you today     Open Future Orders        Priority Expected Expires Ordered    XR Esophagram w Upper GI Routine 4/4/2018 4/4/2019 4/4/2018            Who to contact     If you have questions or need follow up information about today's clinic visit or your schedule please contact [facility] directly at [phone].  Normal or non-critical lab and imaging results will be communicated to you by Zo[employer], letter or phone within 4 business days after the clinic has received the results. If you do not hear from us within 7 days, please contact the clinic through Zoovehart or phone. If you have a critical or abnormal lab result, we will notify you by phone as soon as possible.  Submit refill requests through [employer] or call your pharmacy and they will forward the refill request to us. Please allow 3 business days for your refill to be completed.          Additional Information About Your Visit        Zoovehart Information     [employer] gives you secure access to your electronic health record. If you see a primary care provider, you can also send messages to your care team and make appointments. If you have  "questions, please call your primary care clinic.  If you do not have a primary care provider, please call [phone] and they will assist you.        Care EveryWhere ID     This is your Care EveryWhere ID. This could be used by other organizations to access your [place] medical records  [id number]        Your Vitals Were     Pulse Temperature Height Last Period Pulse Oximetry BMI (Body Mass Index)    74 98.2  F (36.8  C) (Tympanic) 5' 8\" (1.727 m) 07/12/2017 100% 30.56 kg/m2       Blood Pressure from Last 3 Encounters:   04/04/18 100/68   03/12/18 90/70   03/05/18 116/84    Weight from Last 3 Encounters:   04/04/18 201 lb (91.2 kg)   03/12/18 201 lb 3.2 oz (91.3 kg)   03/05/18 204 lb 12.8 oz (92.9 kg)                 Today's Medication Changes          These changes are accurate as of 4/4/18  2:57 PM.  If you have any questions, ask your nurse or doctor.               Start taking these medicines.        Dose/Directions    sucralfate 1 GM/10ML suspension   Commonly known as:  CARAFATE   Used for:  Gastroesophageal reflux disease with esophagitis   Started by:  Lorenzo Berry MD        Dose:  1 g   Take 10 mLs (1 g) by mouth 4 times daily   Quantity:  420 mL   Refills:  1            Where to get your medicines      These medications were sent to [facility] - [place] - [address]  [address] [place] [address]     Phone:  [phone]     sucralfate 1 GM/10ML suspension                Primary Care Provider Office Phone # Fax #    DESTIN Zuleta [phone]       [facility] [address]  [place] [address]        Equal Access to Services     DANIEL SOLIZ AH: Basil rando Sostephen, waaxda luqadaha, qaybta kaalmada adeegyada, ángela gill. So [facility] [phone].    ATENCIÓN: Si roland roldan, tiene a boone disposición servicios gratuitos de asistencia lingüística. Llame al [phone].    We comply with " applicable federal civil rights laws and [place] laws. We do not discriminate on the basis of race, color, national origin, age, disability, sex, sexual orientation, or gender identity.            Thank you!     [facility] you [facility]  for your care. Our goal is always to provide you with excellent care. Hearing back from our patients is one way we can continue to improve our services. Please take a few minutes to complete the written survey that you may receive in the mail after your visit with us. Thank you!             Your Updated Medication List - Protect others around you: Learn how to safely use, store and throw away your medicines at www.disposemymeds.org.          This list is accurate as of 4/4/18  2:57 PM.  Always use your most recent med list.                   Brand Name Dispense Instructions for use Diagnosis    cyclobenzaprine 5 MG tablet    FLEXERIL    42 tablet    Take 1 tablet (5 mg) by mouth 3 times daily as needed for muscle spasms    Cervical paraspinal muscle spasm       escitalopram 10 MG tablet    LEXAPRO    30 tablet    Take 1 tablet (10 mg) by mouth daily    Cervical paraspinal muscle spasm       loratadine 10 MG tablet    CLARITIN     Take 10 mg by mouth as needed        pantoprazole 20 MG EC tablet    PROTONIX    30 tablet    Take by mouth 30-60 minutes before a meal.    Other chest pain       sucralfate 1 GM/10ML suspension    CARAFATE    420 mL    Take 10 mLs (1 g) by mouth 4 times daily    Gastroesophageal reflux disease with esophagitis       zolpidem 10 MG tablet    AMBIEN    30 tablet    Take 1 tablet (10 mg) by mouth nightly as needed for sleep    Persistent disorder of initiating or maintaining sleep

## 2018-04-04 NOTE — NURSING NOTE
"  Chief Complaint   Patient presents with     Consult     Gallbladder. Antionette Edward referring. Last EGD 03/05/2018.       Initial /68 (BP Location: Left arm, Patient Position: Sitting, Cuff Size: Adult Large)  Pulse 74  Temp 98.2  F (36.8  C) (Tympanic)  Ht 5' 8\" (1.727 m)  Wt 201 lb (91.2 kg)  LMP 07/12/2017  SpO2 100%  BMI 30.56 kg/m2 Estimated body mass index is 30.56 kg/(m^2) as calculated from the following:    Height as of this encounter: 5' 8\" (1.727 m).    Weight as of this encounter: 201 lb (91.2 kg).  Medication Reconciliation: complete   Idalia Velez LPN      "

## 2018-04-04 NOTE — PATIENT INSTRUCTIONS
Thank you for allowing Dr. Berry and our surgical team to participate in your care. Please call with any scheduling questions or concerns to Krysta at [phone] or for nursing questions to  [phone]  You have an order placed with [facility]. They will call you to set up an appointment for your test specified by Dr. Berry.

## 2018-04-04 NOTE — PROGRESS NOTES
"SUBJECTIVE:  Mrs. Harris returns to discuss her reflux and epigastric discomfort. She has not had any right upper quadrant abdominal discomfort. She continues to have a sensation of reflux in her lower chest where it feels like something is coming up. She has had some regurgitation of food but none recently. She does not wake up with a sour brackish taste in the back of her throat. She can not relate the discomfort occurring with any specific foods. She has not had any nausea or emesis.     OBJECTIVE:  /68 (BP Location: Left arm, Patient Position: Sitting, Cuff Size: Adult Large)  Pulse 74  Temp 98.2  F (36.8  C) (Tympanic)  Ht 5' 8\" (1.727 m)  Wt 201 lb (91.2 kg)  LMP 07/12/2017  SpO2 100%  BMI 30.56 kg/m2    General: AAA, NAD  Cardiac: RRR  Pulm: CTAB    ASSESSMENT/PLAN:  52 year old female with GERD    We will proceed forward with an esophagram to evaluate for any reflux evidence on the esophagram that was not visualized with the EGD. I will start the patient on some Carafate as well. She is to continue her PPI. When she has her esophagram we will notify her of the results. I discussed that I would not perform a cholecystectomy for gallbladder polyps when the symptoms she is having appear to be related to reflux.     I spent 30 minutes with the patient where >50% was spent counseling the patient on treatment, workup and dietary modifications/lifestyle modifications for gastroesophageal reflux.    Lorenzo Berry  4/4/2018      "

## 2018-04-06 ENCOUNTER — HOSPITAL ENCOUNTER (OUTPATIENT)
Dept: GENERAL RADIOLOGY | Facility: HOSPITAL | Age: 53
Discharge: HOME | End: 2018-04-06
Attending: SURGERY | Admitting: SURGERY
Payer: COMMERCIAL

## 2018-04-06 DIAGNOSIS — K21.00 GASTROESOPHAGEAL REFLUX DISEASE WITH ESOPHAGITIS: ICD-10-CM

## 2018-04-06 PROCEDURE — 25500045 ZZH RX 255: Performed by: RADIOLOGY

## 2018-04-06 PROCEDURE — 74240 X-RAY XM UPR GI TRC 1CNTRST: CPT | Mod: TC

## 2018-04-06 RX ADMIN — ANTACID/ANTIFLATULENT 4 G: 380; 550; 10; 10 GRANULE, EFFERVESCENT ORAL at 09:15

## 2018-04-10 ENCOUNTER — TELEPHONE (OUTPATIENT)
Dept: SURGERY | Facility: OTHER | Age: 53
End: 2018-04-10

## 2018-04-10 NOTE — TELEPHONE ENCOUNTER
Patient called and is looking for results from her procedure with Dr Berry on 4-6-18. Patient can be reached at [phone].

## 2018-04-19 ENCOUNTER — OFFICE VISIT (OUTPATIENT)
Dept: FAMILY MEDICINE | Facility: OTHER | Age: 53
End: 2018-04-19
Attending: PHYSICIAN ASSISTANT
Payer: COMMERCIAL

## 2018-04-19 VITALS
DIASTOLIC BLOOD PRESSURE: 64 MMHG | SYSTOLIC BLOOD PRESSURE: 104 MMHG | BODY MASS INDEX: 30.11 KG/M2 | HEART RATE: 72 BPM | TEMPERATURE: 97.6 F | WEIGHT: 198 LBS | OXYGEN SATURATION: 98 %

## 2018-04-19 DIAGNOSIS — F51.04 PSYCHOPHYSIOLOGICAL INSOMNIA: ICD-10-CM

## 2018-04-19 DIAGNOSIS — F41.1 GAD (GENERALIZED ANXIETY DISORDER): Primary | ICD-10-CM

## 2018-04-19 DIAGNOSIS — K21.00 GASTROESOPHAGEAL REFLUX DISEASE WITH ESOPHAGITIS: ICD-10-CM

## 2018-04-19 DIAGNOSIS — R07.89 OTHER CHEST PAIN: ICD-10-CM

## 2018-04-19 PROCEDURE — 99214 OFFICE O/P EST MOD 30 MIN: CPT | Performed by: PHYSICIAN ASSISTANT

## 2018-04-19 RX ORDER — PANTOPRAZOLE SODIUM 20 MG/1
TABLET, DELAYED RELEASE ORAL
Qty: 30 TABLET | Refills: 5 | COMMUNITY
Start: 2018-04-19 | End: 2018-04-19

## 2018-04-19 RX ORDER — PANTOPRAZOLE SODIUM 20 MG/1
TABLET, DELAYED RELEASE ORAL
Qty: 30 TABLET | Refills: 5 | Status: SHIPPED | OUTPATIENT
Start: 2018-04-19 | End: 2018-07-12

## 2018-04-19 ASSESSMENT — ANXIETY QUESTIONNAIRES
7. FEELING AFRAID AS IF SOMETHING AWFUL MIGHT HAPPEN: NOT AT ALL
4. TROUBLE RELAXING: NOT AT ALL
5. BEING SO RESTLESS THAT IT IS HARD TO SIT STILL: NOT AT ALL
GAD7 TOTAL SCORE: 2
3. WORRYING TOO MUCH ABOUT DIFFERENT THINGS: NOT AT ALL
IF YOU CHECKED OFF ANY PROBLEMS ON THIS QUESTIONNAIRE, HOW DIFFICULT HAVE THESE PROBLEMS MADE IT FOR YOU TO DO YOUR WORK, TAKE CARE OF THINGS AT HOME, OR GET ALONG WITH OTHER PEOPLE: NOT DIFFICULT AT ALL
6. BECOMING EASILY ANNOYED OR IRRITABLE: SEVERAL DAYS
1. FEELING NERVOUS, ANXIOUS, OR ON EDGE: SEVERAL DAYS
2. NOT BEING ABLE TO STOP OR CONTROL WORRYING: NOT AT ALL

## 2018-04-19 ASSESSMENT — PAIN SCALES - GENERAL: PAINLEVEL: NO PAIN (0)

## 2018-04-19 NOTE — MR AVS SNAPSHOT
After Visit Summary   4/19/2018    Shweta Harris    MRN: [id number]           Patient Information     Date Of Birth          1965        Visit Information        Provider Department      4/19/2018 9:30 AM Antionette Edward PA [facility]        Today's Diagnoses     JANEE (generalized anxiety disorder)    -  1    Psychophysiological insomnia        Gastroesophageal reflux disease with esophagitis        Other chest pain          Care Instructions      Thank you for choosing [facility].   I have office hours 8:00 am to 4:30 pm on Monday's, Wednesday's, Thursday's and Friday's. My nurse and I are out of the office every Tuesday.    Following your visit, when your labs and diagnostic testing have returned, I will review then and you will be contacted by my nurse.  If you are on My Chart, you can also view results there.    For refills, notify your pharmacy regarding what you need and the pharmacy will generate a refill request. Do not call my nurse as she is unable to process refill request. Please plan ahead and allow 3-5 days for refill requests.    You will generally receive a reminder call the day prior to your appointment.  If you cannot attend your appointment, please cancel your appointment with as much notice as possible.  If there is a pattern of failure to present for your appointments, I cannot provide consistent, meaningful, ongoing care for you. It is very important to me that you come in for your care, so we can best assist you with your health care needs.    IMPORTANT:  Please note that it is my standard of practice to NOT participate in prescribing ongoing requested Narcotic Analgesic therapy, and/or participate in the prescribing of other controlled substances.  My nurse and I am happy to assist you with the process of referral for alternative pain management as needed, and other treatment modalities including but not limited to:  Physical Therapy,  Physical Medicine and Rehab, Counseling, Chiropractic Care, Orthopedic Care, and non-narcotic medication management.     In the event that you need to be seen for emergent concerns and I am out of office,  please see one of my colleagues for acute concerns.  You may also present to  or ER.  I appreciate the opportunity to serve you and look forward to supporting your healthcare needs in the future. Please contact me with any questions or concerns that you may have.    Sincerely,      Antionette Edward RN, PA-C               Follow-ups after your visit        Who to contact     If you have questions or need follow up information about today's clinic visit or your schedule please contact [facility] directly at [phone].  Normal or non-critical lab and imaging results will be communicated to you by MyChart, letter or phone within 4 business days after the clinic has received the results. If you do not hear from us within 7 days, please contact the clinic through [employer] or phone. If you have a critical or abnormal lab result, we will notify you by phone as soon as possible.  Submit refill requests through [employer] or call your pharmacy and they will forward the refill request to us. Please allow 3 business days for your refill to be completed.          Additional Information About Your Visit        [employer] Information     [employer] gives you secure access to your electronic health record. If you see a primary care provider, you can also send messages to your care team and make appointments. If you have questions, please call your primary care clinic.  If you do not have a primary care provider, please call [phone] and they will assist you.        Care EveryWhere ID     This is your Care EveryWhere ID. This could be used by other organizations to access your [place] medical records  [id number]        Your Vitals Were     Pulse Temperature Last Period Pulse Oximetry Breastfeeding? BMI (Body Mass  Index)    72 97.6  F (36.4  C) (Tympanic) 07/12/2017 98% No 30.11 kg/m2       Blood Pressure from Last 3 Encounters:   04/19/18 104/64   04/04/18 100/68   03/12/18 90/70    Weight from Last 3 Encounters:   04/19/18 198 lb (89.8 kg)   04/04/18 201 lb (91.2 kg)   03/12/18 201 lb 3.2 oz (91.3 kg)              Today, you had the following     No orders found for display         Today's Medication Changes          These changes are accurate as of 4/19/18 10:30 AM.  If you have any questions, ask your nurse or doctor.               Start taking these medicines.        Dose/Directions    pantoprazole 20 MG EC tablet   Commonly known as:  PROTONIX   Used for:  Other chest pain   Started by:  Antionette Edward PA        TAKE 1 TABLET BY MOUTH 30 TO 60 MINUTES BEFORE A MEAL   Quantity:  30 tablet   Refills:  5            Where to get your medicines      [facility] [place] [facility] [address]  [address] [place] [facility] [address]     Phone:  [phone]     pantoprazole 20 MG EC tablet                Primary Care Provider Office Phone # Fax #    DESTIN Zuleta [phone]       [facility] [address]  [place] [address]        Equal Access to Services     JEANNE SOLIZ AH: Hadii merritt ku hadasho Soomaali, waaxda luqadaha, qaybta kaalmada adeegyada, ángela stephens . So [facility] [phone].    ATENCIÓN: Si habla español, tiene a boone disposición servicios gratuitos de asistencia lingüística. Olaf al [phone].    We comply with applicable federal civil rights laws and [place] laws. We do not discriminate on the basis of race, color, national origin, age, disability, sex, sexual orientation, or gender identity.            Thank you!     Thank you for choosing [facility]  for your care. Our goal is always to provide you with excellent care. Hearing back from our patients is one way we can continue to  improve our services. Please take a few minutes to complete the written survey that you may receive in the mail after your visit with us. Thank you!             Your Updated Medication List - Protect others around you: Learn how to safely use, store and throw away your medicines at www.disposemymeds.org.          This list is accurate as of 4/19/18 10:30 AM.  Always use your most recent med list.                   Brand Name Dispense Instructions for use Diagnosis    cyclobenzaprine 5 MG tablet    FLEXERIL    42 tablet    Take 1 tablet (5 mg) by mouth 3 times daily as needed for muscle spasms    Cervical paraspinal muscle spasm       escitalopram 10 MG tablet    LEXAPRO    30 tablet    Take 1 tablet (10 mg) by mouth daily    Cervical paraspinal muscle spasm       loratadine 10 MG tablet    CLARITIN     Take 10 mg by mouth as needed        pantoprazole 20 MG EC tablet    PROTONIX    30 tablet    TAKE 1 TABLET BY MOUTH 30 TO 60 MINUTES BEFORE A MEAL    Other chest pain       sucralfate 1 GM/10ML suspension    CARAFATE    420 mL    Take 10 mLs (1 g) by mouth 4 times daily    Gastroesophageal reflux disease with esophagitis       zolpidem 10 MG tablet    AMBIEN    30 tablet    Take 1 tablet (10 mg) by mouth nightly as needed for sleep    Persistent disorder of initiating or maintaining sleep

## 2018-04-19 NOTE — NURSING NOTE
"Chief Complaint   Patient presents with     Pharyngitis     Gastrophageal Reflux       Initial /64 (BP Location: Left arm, Patient Position: Chair, Cuff Size: Adult Large)  Pulse 72  Temp 97.6  F (36.4  C) (Tympanic)  Wt 198 lb (89.8 kg)  LMP 07/12/2017  SpO2 98%  Breastfeeding? No  BMI 30.11 kg/m2 Estimated body mass index is 30.11 kg/(m^2) as calculated from the following:    Height as of 4/4/18: 5' 8\" (1.727 m).    Weight as of this encounter: 198 lb (89.8 kg).  Medication Reconciliation: complete   Bebe Javier CMA(AAMA)   "

## 2018-04-19 NOTE — PATIENT INSTRUCTIONS
Thank you for choosing [facility].   I have office hours 8:00 am to 4:30 pm on Monday's, Wednesday's, Thursday's and Friday's. My nurse and I are out of the office every Tuesday.    Following your visit, when your labs and diagnostic testing have returned, I will review then and you will be contacted by my nurse.  If you are on My Chart, you can also view results there.    For refills, notify your pharmacy regarding what you need and the pharmacy will generate a refill request. Do not call my nurse as she is unable to process refill request. Please plan ahead and allow 3-5 days for refill requests.    You will generally receive a reminder call the day prior to your appointment.  If you cannot attend your appointment, please cancel your appointment with as much notice as possible.  If there is a pattern of failure to present for your appointments, I cannot provide consistent, meaningful, ongoing care for you. It is very important to me that you come in for your care, so we can best assist you with your health care needs.    IMPORTANT:  Please note that it is my standard of practice to NOT participate in prescribing ongoing requested Narcotic Analgesic therapy, and/or participate in the prescribing of other controlled substances.  My nurse and I am happy to assist you with the process of referral for alternative pain management as needed, and other treatment modalities including but not limited to:  Physical Therapy, Physical Medicine and Rehab, Counseling, Chiropractic Care, Orthopedic Care, and non-narcotic medication management.     In the event that you need to be seen for emergent concerns and I am out of office,  please see one of my colleagues for acute concerns.  You may also present to  or ER.  I appreciate the opportunity to serve you and look forward to supporting your healthcare needs in the future. Please contact me with any questions or concerns that you may  have.    Sincerely,      Antionette Edward RN, PA-C

## 2018-04-19 NOTE — PROGRESS NOTES
SUBJECTIVE:   Shweta Harris is a 52 year old female who presents to clinic today for the following health issues:        RESPIRATORY SYMPTOMs- Sore Throat       Duration: 1-2 weeks     Description  sore throat, cough and fatigue/malaise    Severity: mild    Accompanying signs and symptoms: patient states feels like glands in throat are swollen     History (predisposing factors):  none    Precipitating or alleviating factors: None    Therapies tried and outcome:  rest and fluids    Gastrointestinal symptoms      Duration: 2-3 months     Description:           REFLUX SYMPTOMS - regurgitation of food, chest pain and feels like something is stuck in throat       Intensity:  mild    Accompanying signs and symptoms:  none    History  Previous {similar problem: YES  Previous evaluation:  Xray and endoscopy     Aggravating factors: meals, fatty meals, caffeine, alcohol, lying down and stressful situations    Alleviating factors: lifestyle change  and low fat diet    Other Therapies tried: None          Problem list and histories reviewed & adjusted, as indicated.  Additional history: as documented    Patient Active Problem List   Diagnosis     Heterozygous factor V Leiden mutation (H)     ACP (advance care planning)     Endometriosis of pelvis     Adenomyosis     Past Surgical History:   Procedure Laterality Date     COLONOSCOPY N/A 12/1/2014    Procedure: COLONOSCOPY;  Surgeon: Blanche Trivedi MD;  [facility]     CYSTOSCOPY N/A 8/23/2017    Procedure: CYSTOSCOPY;;  Surgeon: Santhosh May MD;  [facility]     ESOPHAGOSCOPY, GASTROSCOPY, DUODENOSCOPY (EGD), COMBINED N/A 3/5/2018    Procedure: COMBINED ESOPHAGOSCOPY, GASTROSCOPY, DUODENOSCOPY (EGD);  UPPER ENDOSCOPY WITH BIOPSY;  Surgeon: Lorenzo Berry MD;  [facility]     GYN SURGERY       HYSTERECTOMY TOTAL ABDOMINAL  8/23/2017    Procedure: HYSTERECTOMY TOTAL ABDOMINAL;;  Surgeon: Santhosh May MD;  [facility]     LAPAROSCOPIC ASSISTED  HYSTERECTOMY VAGINAL, BILATERAL SALPINGO-OOPHORECTOMY, COMBINED N/A 8/23/2017    Procedure: COMBINED LAPAROSCOPIC ASSISTED HYSTERECTOMY VAGINAL, SALPINGO-OOPHORECTOMY;  LAPAROSCOPIC ASSISTED VAGINAL HYSTERECTOMY, BILATERAL SALPINGO-OOPHORECTOMY ATTEMPTED, total abdominal hysterctomy with bilateral salpingo-oopherectomy, cystoscopy;  Surgeon: Santhosh May MD;  [facility]     tubal ligation  1991       Social History   Substance Use Topics     Smoking status: Never Smoker     Smokeless tobacco: Never Used      Comment: No passive exposure     Alcohol use Yes      Comment: Occasionally     Family History   Problem Relation Age of Onset     Asthma Father      Other - See Comments Father      Factor V Leiden (Carrier)     Heart Surgery Father 79     2 stents put in his heart.     Alzheimer Disease Mother      Lewie Body Dementia     Breast Cancer Mother      DIABETES Paternal Grandmother      DIABETES Maternal Grandmother      Other - See Comments Other      Niece, Factor V Leiden     Other - See Comments Daughter      Factor V Leiden     Other - See Comments Other      Nephew, Factor V Leiden, no family hx     Other - See Comments Sister      Factor V Leiden         Current Outpatient Prescriptions   Medication Sig Dispense Refill     cyclobenzaprine (FLEXERIL) 5 MG tablet Take 1 tablet (5 mg) by mouth 3 times daily as needed for muscle spasms (Patient not taking: Reported on 4/4/2018) 42 tablet 1     escitalopram (LEXAPRO) 10 MG tablet Take 1 tablet (10 mg) by mouth daily (Patient not taking: Reported on 4/4/2018) 30 tablet 1     loratadine (CLARITIN) 10 MG tablet Take 10 mg by mouth as needed        pantoprazole (PROTONIX) 20 MG EC tablet TAKE 1 TABLET BY MOUTH 30 TO 60 MINUTES BEFORE A MEAL (Patient not taking: Reported on 4/19/2018) 30 tablet 5     sucralfate (CARAFATE) 1 GM/10ML suspension Take 10 mLs (1 g) by mouth 4 times daily (Patient not taking: Reported on 4/19/2018) 420 mL 1     zolpidem (AMBIEN) 10  MG tablet Take 1 tablet (10 mg) by mouth nightly as needed for sleep 30 tablet 5     Allergies   Allergen Reactions     Seasonal Allergies      Recent Labs   Lab Test  02/11/18   1120  02/05/18   0842   06/21/17   1020   LDL   --    --    --   94   HDL   --    --    --   67   TRIG   --    --    --   45   ALT  46  47   --   33   CR  0.77  0.82   < >  0.78   GFRESTIMATED  78  73   < >  77   GFRESTBLACK  >90  88   < >  >90   GFR Calc     POTASSIUM  4.1  4.3   < >  3.9   TSH   --    --    --   2.31    < > = values in this interval not displayed.      BP Readings from Last 3 Encounters:   04/19/18 104/64   04/04/18 100/68   03/12/18 90/70    Wt Readings from Last 3 Encounters:   04/19/18 198 lb (89.8 kg)   04/04/18 201 lb (91.2 kg)   03/12/18 201 lb 3.2 oz (91.3 kg)                    Reviewed and updated as needed this visit by clinical staff  Tobacco  Allergies  Meds  Med Hx  Surg Hx  Fam Hx  Soc Hx      Reviewed and updated as needed this visit by Provider         ROS:  Constitutional, neuro, ENT, endocrine, pulmonary, cardiac, gastrointestinal, genitourinary, musculoskeletal, integument and psychiatric systems are negative, except as otherwise noted.    OBJECTIVE:                                                    /64 (BP Location: Left arm, Patient Position: Chair, Cuff Size: Adult Large)  Pulse 72  Temp 97.6  F (36.4  C) (Tympanic)  Wt 198 lb (89.8 kg)  LMP 07/12/2017  SpO2 98%  Breastfeeding? No  BMI 30.11 kg/m2  Body mass index is 30.11 kg/(m^2).  GENERAL APPEARANCE: healthy, alert and no distress  EYES: Eyes grossly normal to inspection, PERRL and conjunctivae and sclerae normal  HENT: ear canals and TM's normal and nose and mouth without ulcers or lesions  NECK: no adenopathy, no asymmetry, masses, or scars and thyroid normal to palpation  RESP: lungs clear to auscultation - no rales, rhonchi or wheezes  CV: regular rates and rhythm, normal S1 S2, no S3 or S4 and no murmur,  click or rub  ABDOMEN: soft, mid epigastrium is very tender, without hepatosplenomegaly or masses and bowel sounds normal  SKIN: no suspicious lesions or rashes  NEURO: Normal strength and tone, mentation intact and speech normal  PSYCH: mentation appears normal and affect normal/bright    Diagnostic test results:  Diagnostic Test Results:  Results for orders placed or performed during the hospital encounter of 04/06/18   XR Esophagram w Upper GI    Narrative    PROCEDURE: XR ESOPHAGRAM W UPPER GI 4/6/2018 9:18 AM    HISTORY: gastroesophageal reflux; Gastroesophageal reflux disease with  esophagitis    COMPARISONS: None.    TECHNIQUE: Routine double contrast esophagram and upper GI.    FINDINGS: Esophagus is normal in course and caliber. No persistent  filling defect is seen. There is no focal stricture. No hiatal hernia  seen and there is no spontaneous gastroesophageal.    Stomach has a normal appearance as do duodenal bulb and sweep. No  mass, stricture or other abnormality is seen.         Impression    IMPRESSION: No visualized gastroesophageal reflux.    EDUAR GUTIERREZ MD        ASSESSMENT/PLAN:                                                    1. JANEE (generalized anxiety disorder)  She is off all meds.  Doesn't want to take them making changes in her life.     2. Psychophysiological insomnia  Better on life changes and ambien    3. Gastroesophageal reflux disease with esophagitis  Giving her a very sore throat.  Lots of cobblestone.  Only took one pill.  Her barium was negative. If not better after she promises to take her Protonix for 6 weeks. ? eosinophilic cause?       See Patient Instructions    DESTIN [place]  [facility] [place]

## 2018-04-20 ENCOUNTER — OFFICE VISIT (OUTPATIENT)
Dept: FAMILY MEDICINE | Facility: OTHER | Age: 53
End: 2018-04-20
Attending: PHYSICIAN ASSISTANT
Payer: COMMERCIAL

## 2018-04-20 VITALS
OXYGEN SATURATION: 98 % | TEMPERATURE: 98 F | HEART RATE: 81 BPM | RESPIRATION RATE: 12 BRPM | DIASTOLIC BLOOD PRESSURE: 62 MMHG | HEIGHT: 68 IN | WEIGHT: 195.8 LBS | BODY MASS INDEX: 29.67 KG/M2 | SYSTOLIC BLOOD PRESSURE: 100 MMHG

## 2018-04-20 DIAGNOSIS — R21 RASH AND NONSPECIFIC SKIN ERUPTION: Primary | ICD-10-CM

## 2018-04-20 PROCEDURE — 99213 OFFICE O/P EST LOW 20 MIN: CPT | Performed by: FAMILY MEDICINE

## 2018-04-20 RX ORDER — TRIAMCINOLONE ACETONIDE 5 MG/G
CREAM TOPICAL
Qty: 30 G | Refills: 0 | Status: SHIPPED | OUTPATIENT
Start: 2018-04-20 | End: 2018-07-12

## 2018-04-20 RX ORDER — KETOCONAZOLE 20 MG/G
CREAM TOPICAL 2 TIMES DAILY
Qty: 30 G | Refills: 1 | Status: SHIPPED | OUTPATIENT
Start: 2018-04-20 | End: 2018-07-12

## 2018-04-20 ASSESSMENT — ANXIETY QUESTIONNAIRES
3. WORRYING TOO MUCH ABOUT DIFFERENT THINGS: NOT AT ALL
7. FEELING AFRAID AS IF SOMETHING AWFUL MIGHT HAPPEN: NOT AT ALL
6. BECOMING EASILY ANNOYED OR IRRITABLE: SEVERAL DAYS
GAD7 TOTAL SCORE: 1
IF YOU CHECKED OFF ANY PROBLEMS ON THIS QUESTIONNAIRE, HOW DIFFICULT HAVE THESE PROBLEMS MADE IT FOR YOU TO DO YOUR WORK, TAKE CARE OF THINGS AT HOME, OR GET ALONG WITH OTHER PEOPLE: NOT DIFFICULT AT ALL
2. NOT BEING ABLE TO STOP OR CONTROL WORRYING: NOT AT ALL
1. FEELING NERVOUS, ANXIOUS, OR ON EDGE: NOT AT ALL
5. BEING SO RESTLESS THAT IT IS HARD TO SIT STILL: NOT AT ALL
4. TROUBLE RELAXING: NOT AT ALL
GAD7 TOTAL SCORE: 2

## 2018-04-20 ASSESSMENT — PATIENT HEALTH QUESTIONNAIRE - PHQ9: SUM OF ALL RESPONSES TO PHQ QUESTIONS 1-9: 2

## 2018-04-20 ASSESSMENT — PAIN SCALES - GENERAL: PAINLEVEL: NO PAIN (1)

## 2018-04-20 NOTE — PROGRESS NOTES
Shweta MAYER Harris    April 20, 2018    Chief Complaint   Patient presents with     Derm Problem     rash on right rib area       SUBJECTIVE:  Here for rash.  Right flank.  Worried about shingles with step son having recent illnesses.  Wants to make sure ok.  No pain.  Slight itch.      Past Medical History:   Diagnosis Date     Dermatitis fungal 6/30/2011     Factor V Leiden 6/28/2012     Family history of other blood disorders 6/30/2011     Heterozygous factor V Leiden mutation (H) 10/9/2014     Heterozygous factor V Leiden mutation (H)      Palpitations 9/22/2006       Past Surgical History:   Procedure Laterality Date     COLONOSCOPY N/A 12/1/2014    Procedure: COLONOSCOPY;  Surgeon: Blanche Trivedi MD;  [facility]     CYSTOSCOPY N/A 8/23/2017    Procedure: CYSTOSCOPY;;  Surgeon: Santhosh May MD;  [facility]     ESOPHAGOSCOPY, GASTROSCOPY, DUODENOSCOPY (EGD), COMBINED N/A 3/5/2018    Procedure: COMBINED ESOPHAGOSCOPY, GASTROSCOPY, DUODENOSCOPY (EGD);  UPPER ENDOSCOPY WITH BIOPSY;  Surgeon: Lorenzo Berry MD;  [facility]     GYN SURGERY       HYSTERECTOMY TOTAL ABDOMINAL  8/23/2017    Procedure: HYSTERECTOMY TOTAL ABDOMINAL;;  Surgeon: Santhosh May MD;  [facility]     LAPAROSCOPIC ASSISTED HYSTERECTOMY VAGINAL, BILATERAL SALPINGO-OOPHORECTOMY, COMBINED N/A 8/23/2017    Procedure: COMBINED LAPAROSCOPIC ASSISTED HYSTERECTOMY VAGINAL, SALPINGO-OOPHORECTOMY;  LAPAROSCOPIC ASSISTED VAGINAL HYSTERECTOMY, BILATERAL SALPINGO-OOPHORECTOMY ATTEMPTED, total abdominal hysterctomy with bilateral salpingo-oopherectomy, cystoscopy;  Surgeon: Santhosh May MD;  [facility]     tubal ligation  1991       Current Outpatient Prescriptions   Medication Sig Dispense Refill     ketoconazole (NIZORAL) 2 % cream Apply topically 2 times daily 30 g 1     pantoprazole (PROTONIX) 20 MG EC tablet TAKE 1 TABLET BY MOUTH 30 TO 60 MINUTES BEFORE A MEAL 30 tablet 5     triamcinolone (KENALOG) 0.5 % cream Apply  sparingly to affected area three times daily. 30 g 0     zolpidem (AMBIEN) 10 MG tablet Take 1 tablet (10 mg) by mouth nightly as needed for sleep 30 tablet 5     cyclobenzaprine (FLEXERIL) 5 MG tablet Take 1 tablet (5 mg) by mouth 3 times daily as needed for muscle spasms (Patient not taking: Reported on 4/4/2018) 42 tablet 1     escitalopram (LEXAPRO) 10 MG tablet Take 1 tablet (10 mg) by mouth daily (Patient not taking: Reported on 4/4/2018) 30 tablet 1     loratadine (CLARITIN) 10 MG tablet Take 10 mg by mouth as needed        sucralfate (CARAFATE) 1 GM/10ML suspension Take 10 mLs (1 g) by mouth 4 times daily (Patient not taking: Reported on 4/19/2018) 420 mL 1       Allergies   Allergen Reactions     Seasonal Allergies        Family History   Problem Relation Age of Onset     Asthma Father      Other - See Comments Father      Factor V Leiden (Carrier)     Heart Surgery Father 79     2 stents put in his heart.     Alzheimer Disease Mother      Lewie Body Dementia     Breast Cancer Mother      DIABETES Paternal Grandmother      DIABETES Maternal Grandmother      Other - See Comments Other      Niece, Factor V Leiden     Other - See Comments Daughter      Factor V Leiden     Other - See Comments Other      Nephew, Factor V Leiden, no family hx     Other - See Comments Sister      Factor V Leiden       Social History     Social History     Marital status:      Spouse name: N/A     Number of children: N/A     Years of education: N/A     Occupational History     Not on file.     Social History Main Topics     Smoking status: Never Smoker     Smokeless tobacco: Never Used      Comment: No passive exposure     Alcohol use Yes      Comment: Occasionally     Drug use: No     Sexual activity: Yes     Partners: Male     Birth control/ protection: Female Surgical     Other Topics Concern     Caffeine Concern Yes     Coffee, > 6 cups daily     Exercise Yes     Daily     Parent/Sibling W/ Cabg, Mi Or Angioplasty  Before 65f 55m? No     Social History Narrative       5 point ROS negative except as noted above in HPI, including Gen., Resp., CV, GI &  system review.     OBJECTIVE:  B/P: 100/62, Temperature: 98, Pulse: 81, Respirations: 12    GENERAL APPEARANCE: Alert, no acute distress  Right flank elongated oval rash along skin fold.  Scaly and slightly erythematous.  No papules or blisters.   SKIN: no suspicious lesions or rashes to visualized skin  NEURO: Alert, oriented x 3, speech and mentation normal    ASSESSMENT and PLAN:  (R21) Rash and nonspecific skin eruption  (primary encounter diagnosis)  Comment: discussed.   Plan: triamcinolone (KENALOG) 0.5 % cream,         ketoconazole (NIZORAL) 2 % cream        I think it's either eczematous or fungal.  I do not think it's shingles but she will watch for blistering, etc.  Apply both creams.  F/u based on clinical picture.

## 2018-04-20 NOTE — NURSING NOTE
"Chief Complaint   Patient presents with     Derm Problem     rash on right rib area       Initial /62 (BP Location: Left arm, Patient Position: Sitting, Cuff Size: Adult Regular)  Pulse 81  Temp 98  F (36.7  C) (Tympanic)  Resp 12  Ht 5' 8\" (1.727 m)  Wt 195 lb 12.8 oz (88.8 kg)  LMP 07/12/2017  SpO2 98%  BMI 29.77 kg/m2 Estimated body mass index is 29.77 kg/(m^2) as calculated from the following:    Height as of this encounter: 5' 8\" (1.727 m).    Weight as of this encounter: 195 lb 12.8 oz (88.8 kg).  Medication Reconciliation: complete   Laurie Barbour    "

## 2018-04-20 NOTE — MR AVS SNAPSHOT
"              After Visit Summary   4/20/2018    Shweta Harris    MRN: [id number]           Patient Information     Date Of Birth          1965        Visit Information        Provider Department      4/20/2018 9:15 AM Nadir Paiz MD [facility]        Today's Diagnoses     Rash and nonspecific skin eruption    -  1      Care Instructions    F/u with ongoing concerns.           Follow-ups after your visit        Who to contact     If you have questions or need follow up information about today's clinic visit or your schedule please contact [facility] directly at [phone].  Normal or non-critical lab and imaging results will be communicated to you by MyChart, letter or phone within 4 business days after the clinic has received the results. If you do not hear from us within 7 days, please contact the clinic through [employer] or phone. If you have a critical or abnormal lab result, we will notify you by phone as soon as possible.  Submit refill requests through Pay with a Tweet or call your pharmacy and they will forward the refill request to us. Please allow 3 business days for your refill to be completed.          Additional Information About Your Visit        MyChart Information     Pay with a Tweet gives you secure access to your electronic health record. If you see a primary care provider, you can also send messages to your care team and make appointments. If you have questions, please call your primary care clinic.  If you do not have a primary care provider, please call [phone] and they will assist you.        Care EveryWhere ID     This is your Care EveryWhere ID. This could be used by other organizations to access your [facility] medical records  [id number]        Your Vitals Were     Pulse Temperature Respirations Height Last Period Pulse Oximetry    81 98  F (36.7  C) (Tympanic) 12 5' 8\" (1.727 m) 07/12/2017 98%    BMI (Body Mass Index)                   29.77 kg/m2         "    Blood Pressure from Last 3 Encounters:   04/20/18 100/62   04/19/18 104/64   04/04/18 100/68    Weight from Last 3 Encounters:   04/20/18 195 lb 12.8 oz (88.8 kg)   04/19/18 198 lb (89.8 kg)   04/04/18 201 lb (91.2 kg)              Today, you had the following     No orders found for display         Today's Medication Changes          These changes are accurate as of 4/20/18  9:29 AM.  If you have any questions, ask your nurse or doctor.               Start taking these medicines.        Dose/Directions    ketoconazole 2 % cream   Commonly known as:  NIZORAL   Used for:  Rash and nonspecific skin eruption   Started by:  Nadir Paiz MD        Apply topically 2 times daily   Quantity:  30 g   Refills:  1       triamcinolone 0.5 % cream   Commonly known as:  KENALOG   Used for:  Rash and nonspecific skin eruption   Started by:  Nadir Paiz MD        Apply sparingly to affected area three times daily.   Quantity:  30 g   Refills:  0            Where to get your medicines      These medications were sent to [facility] - [place], [place] - [address]  [address], [facility] [address]     Phone:  [phone]     ketoconazole 2 % cream    triamcinolone 0.5 % cream                Primary Care Provider Office Phone # Fax #    DESTIN Zuleta [phone]       [facility] [address]  [facility] [place] [address]        Equal Access to Services     [facility] Hospital[facility]ERT[facility] AH: Hadii aad ku hadasho Soomaali, waaxda luqadaha, qaybta kaalmada adeegyada, waxay perry hayildefonso stephens . So [facility] [phone].    ATENCIÓN: Si habla español, tiene a boone disposición servicios gratuitos de asistencia lingüística. Llame al [phone].    We comply with applicable federal civil rights laws and [place] laws. We do not discriminate on the basis of race, color, national origin, age, disability, sex, sexual orientation, or gender identity.            Thank you!     Thank you for  choosing [facility]  for your care. Our goal is always to provide you with excellent care. Hearing back from our patients is one way we can continue to improve our services. Please take a few minutes to complete the written survey that you may receive in the mail after your visit with us. Thank you!             Your Updated Medication List - Protect others around you: Learn how to safely use, store and throw away your medicines at www.disposemymeds.org.          This list is accurate as of 4/20/18  9:29 AM.  Always use your most recent med list.                   Brand Name Dispense Instructions for use Diagnosis    cyclobenzaprine 5 MG tablet    FLEXERIL    42 tablet    Take 1 tablet (5 mg) by mouth 3 times daily as needed for muscle spasms    Cervical paraspinal muscle spasm       escitalopram 10 MG tablet    LEXAPRO    30 tablet    Take 1 tablet (10 mg) by mouth daily    Cervical paraspinal muscle spasm       ketoconazole 2 % cream    NIZORAL    30 g    Apply topically 2 times daily    Rash and nonspecific skin eruption       loratadine 10 MG tablet    CLARITIN     Take 10 mg by mouth as needed        pantoprazole 20 MG EC tablet    PROTONIX    30 tablet    TAKE 1 TABLET BY MOUTH 30 TO 60 MINUTES BEFORE A MEAL    Other chest pain       sucralfate 1 GM/10ML suspension    CARAFATE    420 mL    Take 10 mLs (1 g) by mouth 4 times daily    Gastroesophageal reflux disease with esophagitis       triamcinolone 0.5 % cream    KENALOG    30 g    Apply sparingly to affected area three times daily.    Rash and nonspecific skin eruption       zolpidem 10 MG tablet    AMBIEN    30 tablet    Take 1 tablet (10 mg) by mouth nightly as needed for sleep    Persistent disorder of initiating or maintaining sleep

## 2018-04-21 ASSESSMENT — PATIENT HEALTH QUESTIONNAIRE - PHQ9: SUM OF ALL RESPONSES TO PHQ QUESTIONS 1-9: 2

## 2018-04-21 ASSESSMENT — ANXIETY QUESTIONNAIRES: GAD7 TOTAL SCORE: 1

## 2018-07-12 ENCOUNTER — OFFICE VISIT (OUTPATIENT)
Dept: FAMILY MEDICINE | Facility: OTHER | Age: 53
End: 2018-07-12
Attending: PHYSICIAN ASSISTANT
Payer: COMMERCIAL

## 2018-07-12 VITALS
WEIGHT: 194 LBS | DIASTOLIC BLOOD PRESSURE: 68 MMHG | TEMPERATURE: 97.8 F | HEART RATE: 80 BPM | BODY MASS INDEX: 29.4 KG/M2 | OXYGEN SATURATION: 96 % | HEIGHT: 68 IN | SYSTOLIC BLOOD PRESSURE: 98 MMHG

## 2018-07-12 DIAGNOSIS — B37.2 CUTANEOUS CANDIDIASIS: ICD-10-CM

## 2018-07-12 DIAGNOSIS — L03.319 CELLULITIS OF TRUNK, UNSPECIFIED SITE OF TRUNK: Primary | ICD-10-CM

## 2018-07-12 PROCEDURE — 99213 OFFICE O/P EST LOW 20 MIN: CPT | Performed by: PHYSICIAN ASSISTANT

## 2018-07-12 RX ORDER — NYSTATIN 100000 U/G
CREAM TOPICAL 3 TIMES DAILY
Qty: 30 G | Refills: 1 | Status: SHIPPED | OUTPATIENT
Start: 2018-07-12 | End: 2018-07-26

## 2018-07-12 RX ORDER — CEPHALEXIN 500 MG/1
500 CAPSULE ORAL 3 TIMES DAILY
Qty: 30 CAPSULE | Refills: 0 | Status: SHIPPED | OUTPATIENT
Start: 2018-07-12 | End: 2018-12-06

## 2018-07-12 ASSESSMENT — ANXIETY QUESTIONNAIRES
5. BEING SO RESTLESS THAT IT IS HARD TO SIT STILL: NOT AT ALL
1. FEELING NERVOUS, ANXIOUS, OR ON EDGE: NOT AT ALL
7. FEELING AFRAID AS IF SOMETHING AWFUL MIGHT HAPPEN: NOT AT ALL
6. BECOMING EASILY ANNOYED OR IRRITABLE: NOT AT ALL
GAD7 TOTAL SCORE: 0
3. WORRYING TOO MUCH ABOUT DIFFERENT THINGS: NOT AT ALL
2. NOT BEING ABLE TO STOP OR CONTROL WORRYING: NOT AT ALL

## 2018-07-12 ASSESSMENT — PATIENT HEALTH QUESTIONNAIRE - PHQ9: 5. POOR APPETITE OR OVEREATING: NOT AT ALL

## 2018-07-12 ASSESSMENT — PAIN SCALES - GENERAL: PAINLEVEL: NO PAIN (0)

## 2018-07-12 NOTE — MR AVS SNAPSHOT
"              After Visit Summary   7/12/2018    Shweta Harris    MRN: [id number]           Patient Information     Date Of Birth          1965        Visit Information        Provider Department      7/12/2018 11:15 AM Mikala Lim PA [facility]        Today's Diagnoses     Cellulitis of trunk, unspecified site of trunk    -  1    Cutaneous candidiasis           Follow-ups after your visit        Who to contact     If you have questions or need follow up information about today's clinic visit or your schedule please contact [facility] directly at [phone].  Normal or non-critical lab and imaging results will be communicated to you by MyChart, letter or phone within 4 business days after the clinic has received the results. If you do not hear from us within 7 days, please contact the clinic through Picture Production Companyhart or phone. If you have a critical or abnormal lab result, we will notify you by phone as soon as possible.  Submit refill requests through Kidbox or call your pharmacy and they will forward the refill request to us. Please allow 3 business days for your refill to be completed.          Additional Information About Your Visit        MyChart Information     Kidbox gives you secure access to your electronic health record. If you see a primary care provider, you can also send messages to your care team and make appointments. If you have questions, please call your primary care clinic.  If you do not have a primary care provider, please call [phone] and they will assist you.        Care EveryWhere ID     This is your Care EveryWhere ID. This could be used by other organizations to access your [place] medical records  [id number]        Your Vitals Were     Pulse Temperature Height Last Period Pulse Oximetry BMI (Body Mass Index)    80 97.8  F (36.6  C) (Tympanic) 5' 8\" (1.727 m) 07/12/2017 96% 29.5 kg/m2       Blood Pressure from Last 3 Encounters:   07/12/18 " 98/68   04/20/18 100/62   04/19/18 104/64    Weight from Last 3 Encounters:   07/12/18 194 lb 0.1 oz (88 kg)   04/20/18 195 lb 12.8 oz (88.8 kg)   04/19/18 198 lb (89.8 kg)              Today, you had the following     No orders found for display         Today's Medication Changes          These changes are accurate as of 7/12/18 11:27 AM.  If you have any questions, ask your nurse or doctor.               Start taking these medicines.        Dose/Directions    cephALEXin 500 MG capsule   Commonly known as:  KEFLEX   Used for:  Cutaneous candidiasis   Started by:  Mikala Lim PA        Dose:  500 mg   Take 1 capsule (500 mg) by mouth 3 times daily   Quantity:  30 capsule   Refills:  0       nystatin cream   Commonly known as:  MYCOSTATIN   Used for:  Cellulitis of trunk, unspecified site of trunk   Started by:  Mikala Lim PA        Apply topically 3 times daily for 14 days   Quantity:  30 g   Refills:  1         Stop taking these medicines if you haven't already. Please contact your care team if you have questions.     cyclobenzaprine 5 MG tablet   Commonly known as:  FLEXERIL   Stopped by:  Mikala Lim PA           escitalopram 10 MG tablet   Commonly known as:  LEXAPRO   Stopped by:  Mikala Lim PA           ketoconazole 2 % cream   Commonly known as:  NIZORAL   Stopped by:  Mikala Lim PA           loratadine 10 MG tablet   Commonly known as:  CLARITIN   Stopped by:  Mikala Lim PA           pantoprazole 20 MG EC tablet   Commonly known as:  PROTONIX   Stopped by:  Mikala Lim PA           sucralfate 1 GM/10ML suspension   Commonly known as:  CARAFATE   Stopped by:  Mikala Lim PA           triamcinolone 0.5 % cream   Commonly known as:  KENALOG   Stopped by:  Mikala Lim PA           zolpidem 10 MG tablet   Commonly known as:  AMBIEN   Stopped by:  Mikala Lim PA                Where to get your medicines      These medications were  sent to [facility] - [place], [place] - [address]  [address], [place] [address]     Phone:  [phone]     cephALEXin 500 MG capsule    nystatin cream                Primary Care Provider Office Phone # Fax #    DESTIN Zuleta [phone]       [address]  [place] [address]        Equal Access to Services     DANIEL SOLIZ : Hadii aad ku hadasho Soomaali, waaxda luqadaha, qaybta kaalmada adeegyada, waxay idiin hayaan adeeg kharash la'aan . So [facility] [phone].    ATENCIÓN: Si habla español, tiene a boone disposición servicios gratuitos de asistencia lingüística. Llame al [phone].    We comply with applicable federal civil rights laws and [place] laws. We do not discriminate on the basis of race, color, national origin, age, disability, sex, sexual orientation, or gender identity.            Thank you!     Thank you for choosing [facility]  for your care. Our goal is always to provide you with excellent care. Hearing back from our patients is one way we can continue to improve our services. Please take a few minutes to complete the written survey that you may receive in the mail after your visit with us. Thank you!             Your Updated Medication List - Protect others around you: Learn how to safely use, store and throw away your medicines at www.disposemymeds.org.          This list is accurate as of 7/12/18 11:27 AM.  Always use your most recent med list.                   Brand Name Dispense Instructions for use Diagnosis    cephALEXin 500 MG capsule    KEFLEX    30 capsule    Take 1 capsule (500 mg) by mouth 3 times daily    Cutaneous candidiasis       nystatin cream    MYCOSTATIN    30 g    Apply topically 3 times daily for 14 days    Cellulitis of trunk, unspecified site of trunk

## 2018-07-12 NOTE — PROGRESS NOTES
SUBJECTIVE:   Shweta Harris is a 52 year old female who presents to clinic today for the following health issues:        Rash      Duration: 1 day    Description  Location: Under Left breast  Itching: no    Intensity:  mild    Accompanying signs and symptoms: Dry flaky red rash    History (similar episodes/previous evaluation): None    Precipitating or alleviating factors:  New exposures:  None  Recent travel: no      Therapies tried and outcome: none          Problem list and histories reviewed & adjusted, as indicated.  Additional history: as documented    Patient Active Problem List   Diagnosis     Heterozygous factor V Leiden mutation (H)     ACP (advance care planning)     Endometriosis of pelvis     Adenomyosis     JANEE (generalized anxiety disorder)     Gastroesophageal reflux disease with esophagitis     Psychophysiological insomnia     Past Surgical History:   Procedure Laterality Date     COLONOSCOPY N/A 12/1/2014    Procedure: COLONOSCOPY;  Surgeon: Blanche Trivedi MD;  [facility]: [facility]     CYSTOSCOPY N/A 8/23/2017    Procedure: CYSTOSCOPY;;  Surgeon: Santhosh May MD;  [facility]     ESOPHAGOSCOPY, GASTROSCOPY, DUODENOSCOPY (EGD), COMBINED N/A 3/5/2018    Procedure: COMBINED ESOPHAGOSCOPY, GASTROSCOPY, DUODENOSCOPY (EGD);  UPPER ENDOSCOPY WITH BIOPSY;  Surgeon: Lorenzo Berry MD;  Location: [facility]     GYN SURGERY       HYSTERECTOMY TOTAL ABDOMINAL  8/23/2017    Procedure: HYSTERECTOMY TOTAL ABDOMINAL;;  Surgeon: Santhosh May MD;  [facility]     LAPAROSCOPIC ASSISTED HYSTERECTOMY VAGINAL, BILATERAL SALPINGO-OOPHORECTOMY, COMBINED N/A 8/23/2017    Procedure: COMBINED LAPAROSCOPIC ASSISTED HYSTERECTOMY VAGINAL, SALPINGO-OOPHORECTOMY;  LAPAROSCOPIC ASSISTED VAGINAL HYSTERECTOMY, BILATERAL SALPINGO-OOPHORECTOMY ATTEMPTED, total abdominal hysterctomy with bilateral salpingo-oopherectomy, cystoscopy;  Surgeon: Santhosh May MD;  Location: [facility]     tubal ligation  1991       Social  "History   Substance Use Topics     Smoking status: Never Smoker     Smokeless tobacco: Never Used      Comment: No passive exposure     Alcohol use Yes      Comment: Occasionally     Family History   Problem Relation Age of Onset     Asthma Father      Other - See Comments Father      Factor V Leiden (Carrier)     Heart Surgery Father 79     2 stents put in his heart.     Alzheimer Disease Mother      Lewie Body Dementia     Breast Cancer Mother      Diabetes Paternal Grandmother      Diabetes Maternal Grandmother      Other - See Comments Other      Niece, Factor V Leiden     Other - See Comments Daughter      Factor V Leiden     Other - See Comments Other      Nephew, Factor V Leiden, no family hx     Other - See Comments Sister      Factor V Leiden         Current Outpatient Prescriptions   Medication Sig Dispense Refill     cephALEXin (KEFLEX) 500 MG capsule Take 1 capsule (500 mg) by mouth 3 times daily 30 capsule 0     nystatin (MYCOSTATIN) cream Apply topically 3 times daily for 14 days 30 g 1     Allergies   Allergen Reactions     Seasonal Allergies        Reviewed and updated as needed this visit by clinical staff       Reviewed and updated as needed this visit by Provider         ROS:  INTEGUMENTARY/SKIN: as above    OBJECTIVE:                                                    BP 98/68  Pulse 80  Temp 97.8  F (36.6  C) (Tympanic)  Ht 5' 8\" (1.727 m)  Wt 194 lb 0.1 oz (88 kg)  LMP 07/12/2017  SpO2 96%  BMI 29.5 kg/m2  Body mass index is 29.5 kg/(m^2).   GENERAL APPEARANCE: healthy, alert and no distress  SKIN: 9 cm well demarcated erythema with satellite papules left inframammary breast  PSYCH: mentation appears normal and affect normal/bright         ASSESSMENT:                                                    (L03.319) Cellulitis of trunk, unspecified site of trunk  (primary encounter diagnosis)  Plan: nystatin (MYCOSTATIN) cream            (B37.2) Cutaneous candidiasis  Plan: cephALEXin " (KEFLEX) 500 MG capsule                PLAN:                                                        ICD-10-CM    1. Cellulitis of trunk, unspecified site of trunk L03.319 nystatin (MYCOSTATIN) cream   2. Cutaneous candidiasis B37.2 cephALEXin (KEFLEX) 500 MG capsule     See AVS/Patient instructions    DESTIN Haile  [facility]

## 2018-07-12 NOTE — NURSING NOTE
"Chief Complaint   Patient presents with     Derm Problem       Initial BP 98/68  Pulse 80  Temp 97.8  F (36.6  C) (Tympanic)  Ht 5' 8\" (1.727 m)  Wt 194 lb 0.1 oz (88 kg)  LMP 07/12/2017  SpO2 96%  BMI 29.5 kg/m2 Estimated body mass index is 29.5 kg/(m^2) as calculated from the following:    Height as of this encounter: 5' 8\" (1.727 m).    Weight as of this encounter: 194 lb 0.1 oz (88 kg).  Medication Reconciliation: complete    Gisele Santana LPN  "

## 2018-07-13 ASSESSMENT — ANXIETY QUESTIONNAIRES: GAD7 TOTAL SCORE: 0

## 2018-07-13 ASSESSMENT — PATIENT HEALTH QUESTIONNAIRE - PHQ9: SUM OF ALL RESPONSES TO PHQ QUESTIONS 1-9: 0

## 2018-12-06 ENCOUNTER — OFFICE VISIT (OUTPATIENT)
Dept: FAMILY MEDICINE | Facility: OTHER | Age: 53
End: 2018-12-06
Attending: PHYSICIAN ASSISTANT
Payer: COMMERCIAL

## 2018-12-06 VITALS
WEIGHT: 201 LBS | DIASTOLIC BLOOD PRESSURE: 84 MMHG | BODY MASS INDEX: 30.56 KG/M2 | HEART RATE: 77 BPM | TEMPERATURE: 97.7 F | OXYGEN SATURATION: 100 % | SYSTOLIC BLOOD PRESSURE: 124 MMHG

## 2018-12-06 DIAGNOSIS — K04.7 TOOTH ABSCESS: Primary | ICD-10-CM

## 2018-12-06 PROCEDURE — 99213 OFFICE O/P EST LOW 20 MIN: CPT | Performed by: PHYSICIAN ASSISTANT

## 2018-12-06 RX ORDER — IBUPROFEN 800 MG/1
800 TABLET, FILM COATED ORAL EVERY 8 HOURS PRN
Qty: 60 TABLET | Refills: 0 | Status: SHIPPED | OUTPATIENT
Start: 2018-12-06 | End: 2021-08-12

## 2018-12-06 ASSESSMENT — ANXIETY QUESTIONNAIRES
4. TROUBLE RELAXING: NOT AT ALL
7. FEELING AFRAID AS IF SOMETHING AWFUL MIGHT HAPPEN: NOT AT ALL
3. WORRYING TOO MUCH ABOUT DIFFERENT THINGS: NOT AT ALL
5. BEING SO RESTLESS THAT IT IS HARD TO SIT STILL: NOT AT ALL
1. FEELING NERVOUS, ANXIOUS, OR ON EDGE: NOT AT ALL
2. NOT BEING ABLE TO STOP OR CONTROL WORRYING: NOT AT ALL
GAD7 TOTAL SCORE: 0
6. BECOMING EASILY ANNOYED OR IRRITABLE: NOT AT ALL

## 2018-12-06 ASSESSMENT — PATIENT HEALTH QUESTIONNAIRE - PHQ9: SUM OF ALL RESPONSES TO PHQ QUESTIONS 1-9: 2

## 2018-12-06 ASSESSMENT — PAIN SCALES - GENERAL: PAINLEVEL: SEVERE PAIN (6)

## 2018-12-06 NOTE — PROGRESS NOTES
SUBJECTIVE:   Shweta Harris is a 53 year old female who presents to clinic today for the following health issues:      Musculoskeletal problem/pain      Duration: about 5 days    Description  Location: mostly right side jaw and face and into the right ear    Intensity:  moderate, severe    Accompanying signs and symptoms: radiation of pain to right ear    History  Previous similar problem: no   Previous evaluation:  none    Precipitating or alleviating factors:  Trauma or overuse: no   Aggravating factors include: constantly but chewing hurts    Therapies tried and outcome: drinking water seems to help          Problem list and histories reviewed & adjusted, as indicated.  Additional history: as documented    Patient Active Problem List   Diagnosis     Heterozygous factor V Leiden mutation (H)     ACP (advance care planning)     Endometriosis of pelvis     Adenomyosis     JANEE (generalized anxiety disorder)     Gastroesophageal reflux disease with esophagitis     Psychophysiological insomnia     Past Surgical History:   Procedure Laterality Date     COLONOSCOPY N/A 12/1/2014    Procedure: COLONOSCOPY;  Surgeon: Blanche Trivedi MD;  [facility]     CYSTOSCOPY N/A 8/23/2017    Procedure: CYSTOSCOPY;;  Surgeon: Santhosh May MD;  [facility]     ESOPHAGOSCOPY, GASTROSCOPY, DUODENOSCOPY (EGD), COMBINED N/A 3/5/2018    Procedure: COMBINED ESOPHAGOSCOPY, GASTROSCOPY, DUODENOSCOPY (EGD);  UPPER ENDOSCOPY WITH BIOPSY;  Surgeon: Lorenzo Berry MD;  [facility]     GYN SURGERY       HYSTERECTOMY TOTAL ABDOMINAL  8/23/2017    Procedure: HYSTERECTOMY TOTAL ABDOMINAL;;  Surgeon: Santhosh May MD;  [facility]     LAPAROSCOPIC ASSISTED HYSTERECTOMY VAGINAL, BILATERAL SALPINGO-OOPHORECTOMY, COMBINED N/A 8/23/2017    Procedure: COMBINED LAPAROSCOPIC ASSISTED HYSTERECTOMY VAGINAL, SALPINGO-OOPHORECTOMY;  LAPAROSCOPIC ASSISTED VAGINAL HYSTERECTOMY, BILATERAL SALPINGO-OOPHORECTOMY ATTEMPTED, total  abdominal hysterctomy with bilateral salpingo-oopherectomy, cystoscopy;  Surgeon: Santhosh Mya MD;  [facility]: [facility]     tubal ligation  1991       Social History   Substance Use Topics     Smoking status: Never Smoker     Smokeless tobacco: Never Used      Comment: No passive exposure     Alcohol use Yes      Comment: Occasionally     Family History   Problem Relation Age of Onset     Asthma Father      Other - See Comments Father      Factor V Leiden (Carrier)     Heart Surgery Father 79     2 stents put in his heart.     Alzheimer Disease Mother      Lewie Body Dementia     Breast Cancer Mother      Diabetes Paternal Grandmother      Diabetes Maternal Grandmother      Other - See Comments Other      Niece, Factor V Leiden     Other - See Comments Daughter      Factor V Leiden     Other - See Comments Other      Nephew, Factor V Leiden, no family hx     Other - See Comments Sister      Factor V Leiden         Current Outpatient Prescriptions   Medication Sig Dispense Refill     amoxicillin-clavulanate (AUGMENTIN) 875-125 MG tablet Take 1 tablet by mouth 2 times daily 20 tablet 0     ibuprofen (ADVIL/MOTRIN) 800 MG tablet Take 1 tablet (800 mg) by mouth every 8 hours as needed for moderate pain 60 tablet 0     Allergies   Allergen Reactions     Seasonal Allergies        Reviewed and updated as needed this visit by clinical staff       Reviewed and updated as needed this visit by Provider         ROS:  Constitutional, HEENT, cardiovascular, pulmonary, gi and gu systems are negative, except as otherwise noted.    OBJECTIVE:                                                    /84  Pulse 77  Temp 97.7  F (36.5  C) (Tympanic)  Wt 201 lb (91.2 kg)  LMP 07/12/2017  SpO2 100%  BMI 30.56 kg/m2  Body mass index is 30.56 kg/(m^2).          Physical Exam:  Constitutional: healthy, alert and no acute distress  Skin: No suspicious rash or skin lesion  ENT: Posterior pharynx moist and pink without edema or exudate.  Mild upper right gingival erythema. No purulence noted.  EAC's clear. TM's intact bilateral.  CV: RRR. No murmur  Pulm: Lungs clear to auscultation without wheeze, rales or rhonchi  Psych: mentation and affect appear normal                   ASSESSMENT/PLAN:                                                    1. Tooth abscess  She will call for dental appt  - amoxicillin-clavulanate (AUGMENTIN) 875-125 MG tablet; Take 1 tablet by mouth 2 times daily  Dispense: 20 tablet; Refill: 0  - ibuprofen (ADVIL/MOTRIN) 800 MG tablet; Take 1 tablet (800 mg) by mouth every 8 hours as needed for moderate pain  Dispense: 60 tablet; Refill: 0      Rest, increase fluids, Tylenol for fever or discomfort. Return to clinic if symptoms persist or worsen.      DESTIN Haile  [facility]

## 2018-12-06 NOTE — MR AVS SNAPSHOT
After Visit Summary   12/6/2018    Shweta Harris    MRN: [id number]           Patient Information     Date Of Birth          1965        Visit Information        Provider Department      12/6/2018 1:45 PM Mikala Lim PA [facility]        Today's Diagnoses     Tooth abscess    -  1       Follow-ups after your visit        Who to contact     If you have questions or need follow up information about today's clinic visit or your schedule please contact [facility] directly at [phone].  Normal or non-critical lab and imaging results will be communicated to you by Needcheckhart, letter or phone within 4 business days after the clinic has received the results. If you do not hear from us within 7 days, please contact the clinic through Needcheckhart or phone. If you have a critical or abnormal lab result, we will notify you by phone as soon as possible.  Submit refill requests through [employer] or call your pharmacy and they will forward the refill request to us. Please allow 3 business days for your refill to be completed.          Additional Information About Your Visit        MyChart Information     B[employer]obs gives you secure access to your electronic health record. If you see a primary care provider, you can also send messages to your care team and make appointments. If you have questions, please call your primary care clinic.  If you do not have a primary care provider, please call [phone] and they will assist you.        Care EveryWhere ID     This is your Care EveryWhere ID. This could be used by other organizations to access your [place] medical records  [id number]        Your Vitals Were     Pulse Temperature Last Period Pulse Oximetry BMI (Body Mass Index)       77 97.7  F (36.5  C) (Tympanic) 07/12/2017 100% 30.56 kg/m2        Blood Pressure from Last 3 Encounters:   12/06/18 124/84   07/12/18 98/68   04/20/18 100/62    Weight from Last 3  Encounters:   12/06/18 201 lb (91.2 kg)   07/12/18 194 lb 0.1 oz (88 kg)   04/20/18 195 lb 12.8 oz (88.8 kg)              Today, you had the following     No orders found for display         Today's Medication Changes          These changes are accurate as of 12/6/18  2:04 PM.  If you have any questions, ask your nurse or doctor.               Start taking these medicines.        Dose/Directions    amoxicillin-clavulanate 875-125 MG tablet   Commonly known as:  AUGMENTIN   Used for:  Tooth abscess   Started by:  Mikala Lim PA        Dose:  1 tablet   Take 1 tablet by mouth 2 times daily   Quantity:  20 tablet   Refills:  0       ibuprofen 800 MG tablet   Commonly known as:  ADVIL/MOTRIN   Used for:  Tooth abscess   Started by:  Mikala Lim PA        Dose:  800 mg   Take 1 tablet (800 mg) by mouth every 8 hours as needed for moderate pain   Quantity:  60 tablet   Refills:  0            Where to get your medicines      These medications were sent to [facility] - [place] [address] [address]     Phone:  [phone]     amoxicillin-clavulanate 875-125 MG tablet    ibuprofen 800 MG tablet                Primary Care Provider Office Phone # Fax #    Antionette ADALBERTO DESTIN Edward [phone]       [address] [place] [phone]        Equal Access to Services     [facility] AH: Hadii merritt ku hadasho Soomaali, waaxda luqadaha, qaybta kaalmada adeegyada, ángela amador hayildefonso stephens . So [facility] [phone].    ATENCIÓN: Si habla español, tiene a boone disposición servicios gratuitos de asistencia lingüística. Llame al [phone].    We comply with applicable federal civil rights laws and [place] laws. We do not discriminate on the basis of race, color, national origin, age, disability, sex, sexual orientation, or gender identity.            Thank you!     Thank you for choosing [facility]  for your  care. Our goal is always to provide you with excellent care. Hearing back from our patients is one way we can continue to improve our services. Please take a few minutes to complete the written survey that you may receive in the mail after your visit with us. Thank you!             Your Updated Medication List - Protect others around you: Learn how to safely use, store and throw away your medicines at www.disposemymeds.org.          This list is accurate as of 12/6/18  2:04 PM.  Always use your most recent med list.                   Brand Name Dispense Instructions for use Diagnosis    amoxicillin-clavulanate 875-125 MG tablet    AUGMENTIN    20 tablet    Take 1 tablet by mouth 2 times daily    Tooth abscess       ibuprofen 800 MG tablet    ADVIL/MOTRIN    60 tablet    Take 1 tablet (800 mg) by mouth every 8 hours as needed for moderate pain    Tooth abscess

## 2018-12-06 NOTE — NURSING NOTE
"Chief Complaint   Patient presents with     Musculoskeletal Problem       Initial /84  Pulse 77  Temp 97.7  F (36.5  C) (Tympanic)  Wt 201 lb (91.2 kg)  LMP 07/12/2017  SpO2 100%  BMI 30.56 kg/m2 Estimated body mass index is 30.56 kg/(m^2) as calculated from the following:    Height as of 7/12/18: 5' 8\" (1.727 m).    Weight as of this encounter: 201 lb (91.2 kg).  Medication Reconciliation: complete    Kathrin Pena MA    "

## 2018-12-07 ASSESSMENT — ANXIETY QUESTIONNAIRES: GAD7 TOTAL SCORE: 0

## 2019-04-18 DIAGNOSIS — M25.531 RIGHT WRIST PAIN: ICD-10-CM

## 2019-04-22 RX ORDER — NAPROXEN 500 MG/1
TABLET ORAL
Qty: 60 TABLET | Refills: 0 | Status: SHIPPED | OUTPATIENT
Start: 2019-04-22 | End: 2020-08-23

## 2019-04-22 NOTE — TELEPHONE ENCOUNTER
Not on med list, due for labs.    Lab Results   Component Value Date    ALT 46 02/11/2018     AST   Date Value Ref Range Status   02/11/2018 21 0 - 45 U/L Final     Lab Results   Component Value Date    WBC 6.0 02/11/2018     Lab Results   Component Value Date    RBC 4.85 02/11/2018     Lab Results   Component Value Date    HGB 14.1 02/11/2018     Lab Results   Component Value Date    HCT 41.0 02/11/2018     No components found for: MCT  Lab Results   Component Value Date    MCV 85 02/11/2018     Lab Results   Component Value Date    MCH 29.1 02/11/2018     Lab Results   Component Value Date    MCHC 34.4 02/11/2018     Lab Results   Component Value Date    RDW 13.0 02/11/2018     Lab Results   Component Value Date     02/11/2018   naproxen (NAPROSYN) 500 MG tablet (Discontinued)        Last Written Prescription Date:  5/29/15-8/27/15  Last Fill Quantity: 60,   # refills: 0  Last Office Visit: 3/12/18  Future Office visit:       Routing refill request to provider for review/approval because:  Drug not active on patient's medication list

## 2019-06-21 DIAGNOSIS — Z12.39 BREAST CANCER SCREENING: Primary | ICD-10-CM

## 2019-07-05 NOTE — PROGRESS NOTES
SUBJECTIVE:   CC: Shweta Harris is an 53 year old woman who presents for preventive health visit.     Healthy Habits:    Do you get at least three servings of calcium containing foods daily (dairy, green leafy vegetables, etc.)?  No    Amount of exercise or daily activities, outside of work: 7 day(s) per week    Problems taking medications regularly No    Medication side effects: No    Have you had an eye exam in the past two years? yes    Do you see a dentist twice per year? yes    Do you have sleep apnea, excessive snoring or daytime drowsiness?no          Today's PHQ-2 Score:   PHQ-2 ( 1999 [employer]) 10/11/2016 6/27/2014   Q1: Little interest or pleasure in doing things 0 0   Q2: Feeling down, depressed or hopeless 0 0   PHQ-2 Score 0 0       Abuse: Current or Past(Physical, Sexual or Emotional)- No  Do you feel safe in your environment? Yes    Social History     Tobacco Use     Smoking status: Never Smoker     Smokeless tobacco: Never Used     Tobacco comment: No passive exposure   Substance Use Topics     Alcohol use: Yes     Comment: Occasionally     If you drink alcohol do you typically have >3 drinks per day or >7 drinks per week? No                     Reviewed orders with patient.  Reviewed health maintenance and updated orders accordingly - Yes  Lab work is in process  Labs reviewed in EPIC  BP Readings from Last 3 Encounters:   07/11/19 112/78   12/06/18 124/84   07/12/18 98/68    Wt Readings from Last 3 Encounters:   07/11/19 92.1 kg (203 lb)   12/06/18 91.2 kg (201 lb)   07/12/18 88 kg (194 lb 0.1 oz)                  Patient Active Problem List   Diagnosis     Heterozygous factor V Leiden mutation (H)     ACP (advance care planning)     Endometriosis of pelvis     Adenomyosis     JANEE (generalized anxiety disorder)     Gastroesophageal reflux disease with esophagitis     Psychophysiological insomnia     Past Surgical History:   Procedure Laterality Date     COLONOSCOPY N/A 12/1/2014    Procedure:  COLONOSCOPY;  Surgeon: Blanche Trivedi MD;  [facility]     CYSTOSCOPY N/A 8/23/2017    Procedure: CYSTOSCOPY;;  Surgeon: Santhosh May MD;  [facility]     ESOPHAGOSCOPY, GASTROSCOPY, DUODENOSCOPY (EGD), COMBINED N/A 3/5/2018    Procedure: COMBINED ESOPHAGOSCOPY, GASTROSCOPY, DUODENOSCOPY (EGD);  UPPER ENDOSCOPY WITH BIOPSY;  Surgeon: Lorenzo Berry MD;  [facility]     GYN SURGERY       HYSTERECTOMY TOTAL ABDOMINAL  8/23/2017    Procedure: HYSTERECTOMY TOTAL ABDOMINAL;;  Surgeon: Santhosh May MD;  [facility]     LAPAROSCOPIC ASSISTED HYSTERECTOMY VAGINAL, BILATERAL SALPINGO-OOPHORECTOMY, COMBINED N/A 8/23/2017    Procedure: COMBINED LAPAROSCOPIC ASSISTED HYSTERECTOMY VAGINAL, SALPINGO-OOPHORECTOMY;  LAPAROSCOPIC ASSISTED VAGINAL HYSTERECTOMY, BILATERAL SALPINGO-OOPHORECTOMY ATTEMPTED, total abdominal hysterctomy with bilateral salpingo-oopherectomy, cystoscopy;  Surgeon: Santhosh May MD;  [facility]     tubal ligation  1991       Social History     Tobacco Use     Smoking status: Never Smoker     Smokeless tobacco: Never Used     Tobacco comment: No passive exposure   Substance Use Topics     Alcohol use: Yes     Comment: Occasionally     Family History   Problem Relation Age of Onset     Asthma Father      Other - See Comments Father         Factor V Leiden ([place])     Heart Surgery Father 79        2 stents put in his heart.     Alzheimer Disease Mother         Lewie Body Dementia     Breast Cancer Mother      Diabetes Paternal Grandmother      Diabetes Maternal Grandmother      Other - See Comments Other         Niece, Factor V Leiden     Other - See Comments Daughter         Factor V Leiden     Other - See Comments Other         Nephew, Factor V Leiden, no family hx     Other - See Comments Sister         Factor V Leiden         Current Outpatient Medications   Medication Sig Dispense Refill     ibuprofen (ADVIL/MOTRIN) 800 MG tablet Take 1 tablet (800 mg) by mouth every 8  hours as needed for moderate pain 60 tablet 0     naproxen (NAPROSYN) 500 MG tablet TAKE 1 TABLET BY MOUTH TWICE DAILY AS NEEDED FOR MODERATE PAIN 60 tablet 0     UNABLE TO FIND Tumeric       UNABLE TO FIND Estrovan       Allergies   Allergen Reactions     Seasonal Allergies      Recent Labs   Lab Test 02/11/18  1120 02/05/18  0842  06/21/17  1020   LDL  --   --   --  94   HDL  --   --   --  67   TRIG  --   --   --  45   ALT 46 47  --  33   CR 0.77 0.82   < > 0.78   GFRESTIMATED 78 73   < > 77   GFRESTBLACK >90 88   < > >90   GFR Calc     POTASSIUM 4.1 4.3   < > 3.9   TSH  --   --   --  2.31    < > = values in this interval not displayed.        Mammogram Screening: Patient over age 50, mutual decision to screen reflected in health maintenance.    Pertinent mammograms are reviewed under the imaging tab.  History of abnormal Pap smear:   Last 3 Pap and HPV Results:   PAP / HPV Latest Ref Rng & Units 6/21/2017 10/9/2014   PAP - OTHER-NIL, See Result OTHER-NIL, See Result   HPV 16 DNA NEG Negative -   HPV 18 DNA NEG Negative -   OTHER HR HPV NEG Negative -     PAP / HPV Latest Ref Rng & Units 6/21/2017 10/9/2014   PAP - OTHER-NIL, See Result OTHER-NIL, See Result   HPV 16 DNA NEG Negative -   HPV 18 DNA NEG Negative -   OTHER HR HPV NEG Negative -     Reviewed and updated as needed this visit by clinical staff  Tobacco  Allergies  Meds         Reviewed and updated as needed this visit by Provider          Past Medical History:   Diagnosis Date     Dermatitis fungal 6/30/2011     Factor V Leiden 6/28/2012     Family history of other blood disorders 6/30/2011     Heterozygous factor V Leiden mutation (H) 10/9/2014     Heterozygous factor V Leiden mutation (H)      Palpitations 9/22/2006      Past Surgical History:   Procedure Laterality Date     COLONOSCOPY N/A 12/1/2014    Procedure: COLONOSCOPY;  Surgeon: Blanche Trivedi MD;  [facility]     CYSTOSCOPY N/A 8/23/2017    Procedure: CYSTOSCOPY;;   "Surgeon: Santhosh May MD;  [facility]     ESOPHAGOSCOPY, GASTROSCOPY, DUODENOSCOPY (EGD), COMBINED N/A 3/5/2018    Procedure: COMBINED ESOPHAGOSCOPY, GASTROSCOPY, DUODENOSCOPY (EGD);  UPPER ENDOSCOPY WITH BIOPSY;  Surgeon: Lorenzo Berry MD;  [facility]     GYN SURGERY       HYSTERECTOMY TOTAL ABDOMINAL  8/23/2017    Procedure: HYSTERECTOMY TOTAL ABDOMINAL;;  Surgeon: Santhosh May MD;  [facility]     LAPAROSCOPIC ASSISTED HYSTERECTOMY VAGINAL, BILATERAL SALPINGO-OOPHORECTOMY, COMBINED N/A 8/23/2017    Procedure: COMBINED LAPAROSCOPIC ASSISTED HYSTERECTOMY VAGINAL, SALPINGO-OOPHORECTOMY;  LAPAROSCOPIC ASSISTED VAGINAL HYSTERECTOMY, BILATERAL SALPINGO-OOPHORECTOMY ATTEMPTED, total abdominal hysterctomy with bilateral salpingo-oopherectomy, cystoscopy;  Surgeon: Santhosh May MD;  [facility]     tubal ligation  1991     OB History   No data available       ROS:  CONSTITUTIONAL: NEGATIVE for fever, chills, change in weight  INTEGUMENTARY/SKIN: NEGATIVE for worrisome rashes, moles or lesions  EYES: NEGATIVE for vision changes or irritation  ENT: NEGATIVE for ear, mouth and throat problems  RESP: NEGATIVE for significant cough or SOB  BREAST: NEGATIVE for masses, tenderness or discharge  CV: NEGATIVE for chest pain, palpitations or peripheral edema  GI: NEGATIVE for nausea, abdominal pain, heartburn, or change in bowel habits  : NEGATIVE for unusual urinary or vaginal symptoms. No vaginal bleeding.  MUSCULOSKELETAL: NEGATIVE for significant arthralgias or myalgia  NEURO: NEGATIVE for weakness, dizziness or paresthesias  ENDOCRINE: NEGATIVE for temperature intolerance, skin/hair changes  PSYCHIATRIC: NEGATIVE for changes in mood or affect     OBJECTIVE:   /78 (BP Location: Right arm, Patient Position: Sitting, Cuff Size: Adult Regular)   Pulse 69   Temp 97.6  F (36.4  C) (Tympanic)   Ht 1.702 m (5' 7\")   Wt 92.1 kg (203 lb)   LMP 07/12/2017   SpO2 98%   BMI 31.79 kg/m  "   EXAM:  GENERAL APPEARANCE: healthy, alert and no distress  EYES: Eyes grossly normal to inspection, PERRL and conjunctivae and sclerae normal  HENT: ear canals and TM's normal, nose and mouth without ulcers or lesions, oropharynx clear and oral mucous membranes moist  NECK: no adenopathy, no asymmetry, masses, or scars and thyroid normal to palpation  RESP: lungs clear to auscultation - no rales, rhonchi or wheezes  BREAST: normal without masses, tenderness or nipple discharge and no palpable axillary masses or adenopathy  CV: regular rate and rhythm, normal S1 S2, no S3 or S4, no murmur, click or rub, no peripheral edema and peripheral pulses strong  ABDOMEN: soft, nontender, no hepatosplenomegaly, no masses and bowel sounds normal  MS: no musculoskeletal defects are noted and gait is age appropriate without ataxia  SKIN: no suspicious lesions or rashes  NEURO: Normal strength and tone, sensory exam grossly normal, mentation intact and speech normal  PSYCH: mentation appears normal and affect normal/bright    Diagnostic Test Results:  Labs reviewed in Epic  No results found for this or any previous visit (from the past 24 hour(s)).    ASSESSMENT/PLAN:   1. Routine general medical examination at a health care facility  She is retiring tomorrow. 43 years at the [employer].  She is up to date on immunization except shingles but not available to day. Mammogram is scheduled. She is due for labs and they are placed. We will not need a pap in lifetime due to her hysterectomy.  Hot flashes are present. But avoiding HRT.  She has gained weight.       - TSH with free T4 reflex; Future  - Comprehensive metabolic panel; Future  - Lipid Profile; Future  - CBC with platelets differential; Future  - UA with Microscopic reflex to Culture; Future    2. Heterozygous factor V Leiden mutation (H)  Long standing hx now found out on both sides of her family. Not taking any HRT due to hx of this.     3. Left foot pain  Pain in bunion and  "also on medial heel. Nothing on bottom of heel and has pain   - XR FOOT LT G/E 3 VW (Clinic Performed); Future        COUNSELING:   Reviewed preventive health counseling, as reflected in patient instructions       Regular exercise       Healthy diet/nutrition       Vision screening       Hearing screening       Immunizations    Vaccinated for: needing shingles.              One time pneumovax for smokers    Estimated body mass index is 31.79 kg/m  as calculated from the following:    Height as of this encounter: 1.702 m (5' 7\").    Weight as of this encounter: 92.1 kg (203 lb).         reports that she has never smoked. She has never used smokeless tobacco.      Counseling Resources:  ATP IV Guidelines  Pooled Cohorts Equation Calculator  Breast Cancer Risk Calculator  FRAX Risk Assessment  ICSI Preventive Guidelines  Dietary Guidelines for Americans, 2010  USDA's MyPlate  ASA Prophylaxis  Lung CA Screening    DESTIN Elizabeth  [facility] - [place]  "

## 2019-07-11 ENCOUNTER — ANCILLARY PROCEDURE (OUTPATIENT)
Dept: MAMMOGRAPHY | Facility: OTHER | Age: 54
End: 2019-07-11
Attending: PHYSICIAN ASSISTANT
Payer: COMMERCIAL

## 2019-07-11 ENCOUNTER — ANCILLARY PROCEDURE (OUTPATIENT)
Dept: GENERAL RADIOLOGY | Facility: OTHER | Age: 54
End: 2019-07-11
Attending: PHYSICIAN ASSISTANT
Payer: COMMERCIAL

## 2019-07-11 ENCOUNTER — OFFICE VISIT (OUTPATIENT)
Dept: FAMILY MEDICINE | Facility: OTHER | Age: 54
End: 2019-07-11
Attending: PHYSICIAN ASSISTANT
Payer: COMMERCIAL

## 2019-07-11 VITALS
BODY MASS INDEX: 31.86 KG/M2 | SYSTOLIC BLOOD PRESSURE: 112 MMHG | HEIGHT: 67 IN | OXYGEN SATURATION: 98 % | TEMPERATURE: 97.6 F | WEIGHT: 203 LBS | HEART RATE: 69 BPM | DIASTOLIC BLOOD PRESSURE: 78 MMHG

## 2019-07-11 DIAGNOSIS — M79.672 LEFT FOOT PAIN: ICD-10-CM

## 2019-07-11 DIAGNOSIS — D68.51 HETEROZYGOUS FACTOR V LEIDEN MUTATION (H): ICD-10-CM

## 2019-07-11 DIAGNOSIS — Z00.00 ROUTINE GENERAL MEDICAL EXAMINATION AT A HEALTH CARE FACILITY: Primary | ICD-10-CM

## 2019-07-11 DIAGNOSIS — Z12.39 BREAST CANCER SCREENING: ICD-10-CM

## 2019-07-11 PROCEDURE — 77063 BREAST TOMOSYNTHESIS BI: CPT | Mod: TC

## 2019-07-11 PROCEDURE — 99396 PREV VISIT EST AGE 40-64: CPT | Performed by: PHYSICIAN ASSISTANT

## 2019-07-11 PROCEDURE — 73630 X-RAY EXAM OF FOOT: CPT | Mod: TC

## 2019-07-11 PROCEDURE — 77067 SCR MAMMO BI INCL CAD: CPT | Mod: TC

## 2019-07-11 ASSESSMENT — ANXIETY QUESTIONNAIRES
3. WORRYING TOO MUCH ABOUT DIFFERENT THINGS: NOT AT ALL
2. NOT BEING ABLE TO STOP OR CONTROL WORRYING: NOT AT ALL
GAD7 TOTAL SCORE: 0
1. FEELING NERVOUS, ANXIOUS, OR ON EDGE: NOT AT ALL
5. BEING SO RESTLESS THAT IT IS HARD TO SIT STILL: NOT AT ALL
6. BECOMING EASILY ANNOYED OR IRRITABLE: NOT AT ALL
7. FEELING AFRAID AS IF SOMETHING AWFUL MIGHT HAPPEN: NOT AT ALL

## 2019-07-11 ASSESSMENT — PATIENT HEALTH QUESTIONNAIRE - PHQ9
SUM OF ALL RESPONSES TO PHQ QUESTIONS 1-9: 1
5. POOR APPETITE OR OVEREATING: NOT AT ALL

## 2019-07-11 ASSESSMENT — MIFFLIN-ST. JEOR: SCORE: 1548.43

## 2019-07-11 ASSESSMENT — PAIN SCALES - GENERAL: PAINLEVEL: MILD PAIN (2)

## 2019-07-11 NOTE — NURSING NOTE
"Chief Complaint   Patient presents with     Physical       Initial /78 (BP Location: Right arm, Patient Position: Sitting, Cuff Size: Adult Regular)   Pulse 69   Temp 97.6  F (36.4  C) (Tympanic)   Ht 1.702 m (5' 7\")   Wt 92.1 kg (203 lb)   LMP 07/12/2017   SpO2 98%   BMI 31.79 kg/m   Estimated body mass index is 31.79 kg/m  as calculated from the following:    Height as of this encounter: 1.702 m (5' 7\").    Weight as of this encounter: 92.1 kg (203 lb).  Medication Reconciliation: complete  "

## 2019-07-12 DIAGNOSIS — Z00.00 ROUTINE GENERAL MEDICAL EXAMINATION AT A HEALTH CARE FACILITY: ICD-10-CM

## 2019-07-12 LAB
ALBUMIN SERPL-MCNC: 3.8 G/DL (ref 3.4–5)
ALBUMIN UR-MCNC: NEGATIVE MG/DL
ALP SERPL-CCNC: 115 U/L (ref 40–150)
ALT SERPL W P-5'-P-CCNC: 45 U/L (ref 0–50)
ANION GAP SERPL CALCULATED.3IONS-SCNC: 3 MMOL/L (ref 3–14)
APPEARANCE UR: CLEAR
AST SERPL W P-5'-P-CCNC: 25 U/L (ref 0–45)
BACTERIA #/AREA URNS HPF: ABNORMAL /HPF
BASOPHILS # BLD AUTO: 0 10E9/L (ref 0–0.2)
BASOPHILS NFR BLD AUTO: 0.6 %
BILIRUB SERPL-MCNC: 0.7 MG/DL (ref 0.2–1.3)
BILIRUB UR QL STRIP: NEGATIVE
BUN SERPL-MCNC: 17 MG/DL (ref 7–30)
CALCIUM SERPL-MCNC: 8.8 MG/DL (ref 8.5–10.1)
CHLORIDE SERPL-SCNC: 108 MMOL/L (ref 94–109)
CHOLEST SERPL-MCNC: 164 MG/DL
CO2 SERPL-SCNC: 28 MMOL/L (ref 20–32)
COLOR UR AUTO: ABNORMAL
CREAT SERPL-MCNC: 0.78 MG/DL (ref 0.52–1.04)
DIFFERENTIAL METHOD BLD: NORMAL
EOSINOPHIL # BLD AUTO: 0.2 10E9/L (ref 0–0.7)
EOSINOPHIL NFR BLD AUTO: 3.5 %
ERYTHROCYTE [DISTWIDTH] IN BLOOD BY AUTOMATED COUNT: 12.8 % (ref 10–15)
GFR SERPL CREATININE-BSD FRML MDRD: 85 ML/MIN/{1.73_M2}
GLUCOSE SERPL-MCNC: 88 MG/DL (ref 70–99)
GLUCOSE UR STRIP-MCNC: NEGATIVE MG/DL
HCT VFR BLD AUTO: 39.9 % (ref 35–47)
HDLC SERPL-MCNC: 71 MG/DL
HGB BLD-MCNC: 13.4 G/DL (ref 11.7–15.7)
HGB UR QL STRIP: NEGATIVE
IMM GRANULOCYTES # BLD: 0 10E9/L (ref 0–0.4)
IMM GRANULOCYTES NFR BLD: 0.2 %
KETONES UR STRIP-MCNC: NEGATIVE MG/DL
LDLC SERPL CALC-MCNC: 81 MG/DL
LEUKOCYTE ESTERASE UR QL STRIP: ABNORMAL
LYMPHOCYTES # BLD AUTO: 1 10E9/L (ref 0.8–5.3)
LYMPHOCYTES NFR BLD AUTO: 21.2 %
MCH RBC QN AUTO: 29.4 PG (ref 26.5–33)
MCHC RBC AUTO-ENTMCNC: 33.6 G/DL (ref 31.5–36.5)
MCV RBC AUTO: 88 FL (ref 78–100)
MONOCYTES # BLD AUTO: 0.5 10E9/L (ref 0–1.3)
MONOCYTES NFR BLD AUTO: 10.7 %
MUCOUS THREADS #/AREA URNS LPF: PRESENT /LPF
NEUTROPHILS # BLD AUTO: 3.1 10E9/L (ref 1.6–8.3)
NEUTROPHILS NFR BLD AUTO: 63.8 %
NITRATE UR QL: NEGATIVE
NONHDLC SERPL-MCNC: 93 MG/DL
NRBC # BLD AUTO: 0 10*3/UL
NRBC BLD AUTO-RTO: 0 /100
PH UR STRIP: 6 PH (ref 4.7–8)
PLATELET # BLD AUTO: 189 10E9/L (ref 150–450)
POTASSIUM SERPL-SCNC: 4 MMOL/L (ref 3.4–5.3)
PROT SERPL-MCNC: 7.2 G/DL (ref 6.8–8.8)
RBC # BLD AUTO: 4.56 10E12/L (ref 3.8–5.2)
RBC #/AREA URNS AUTO: <1 /HPF (ref 0–2)
SODIUM SERPL-SCNC: 139 MMOL/L (ref 133–144)
SOURCE: ABNORMAL
SP GR UR STRIP: 1.02 (ref 1–1.03)
TRIGL SERPL-MCNC: 58 MG/DL
TSH SERPL DL<=0.005 MIU/L-ACNC: 2.18 MU/L (ref 0.4–4)
UROBILINOGEN UR STRIP-MCNC: NORMAL MG/DL (ref 0–2)
WBC # BLD AUTO: 4.9 10E9/L (ref 4–11)
WBC #/AREA URNS AUTO: <1 /HPF (ref 0–5)

## 2019-07-12 PROCEDURE — 80061 LIPID PANEL: CPT | Performed by: PHYSICIAN ASSISTANT

## 2019-07-12 PROCEDURE — 36415 COLL VENOUS BLD VENIPUNCTURE: CPT | Performed by: PHYSICIAN ASSISTANT

## 2019-07-12 PROCEDURE — 81001 URINALYSIS AUTO W/SCOPE: CPT | Performed by: PHYSICIAN ASSISTANT

## 2019-07-12 PROCEDURE — 80050 GENERAL HEALTH PANEL: CPT | Performed by: PHYSICIAN ASSISTANT

## 2019-07-12 ASSESSMENT — ANXIETY QUESTIONNAIRES: GAD7 TOTAL SCORE: 0

## 2019-08-06 NOTE — IP AVS SNAPSHOT
MRN:[id number]                      After Visit Summary   3/5/2018    Shweta Harris    MRN: [id number]           Thank you!     Thank you for choosing [facility] for your care. Our goal is always to provide you with excellent care. Hearing back from our patients is one way we can continue to improve our services. Please take a few minutes to complete the written survey that you may receive in the mail after you visit with us. Thank you!        Patient Information     Date Of Birth          1965        About your hospital stay     You were admitted on:  March 5, 2018 You last received care in the:  [facility]    You were discharged on:  March 5, 2018       Who to Call     For medical emergencies, please call 911.  For non-urgent questions about your medical care, please call your primary care provider or clinic, [phone]  For questions related to your surgery, please call your surgery clinic        Attending Provider     Provider Specialty    Lorenzo Berry MD Surgery       Primary Care Provider Office Phone # Fax #    DESTIN Zuleta [phone]      After Care Instructions     Discharge Instructions       Resume pre procedure diet            Discharge Instructions       Restart home medications.                  Your next 10 appointments already scheduled     Mar 08, 2018  8:30 AM CST   Treatment with Delia Vasquez PT   [facility] ([facility] )    [address] [address]   [phone]            Mar 12, 2018  9:00 AM CDT   Treatment with Delia Vasquez PT   [facility] ([facility] )    [address] [address]   [phone]            Mar 12, 2018  9:30 AM CDT   (Arrive by 9:15 AM)   Office Visit with DESTIN Zuleta   [facility] )    [address] [facility] [address]   [phone]            Mar 19, 2018  8:30  Okay to switch to 160 mg dose, thanks   AM CDT   Treatment with Delia Vasquez PT   [facility] ([facility] )    [address] [address]   [phone]              Further instructions from your care team           UPPER ENDOSCOPY    PURPOSE:  An Upper Endoscopy is a procedure in which the doctor passes a flexible, lighted tube called a gastroscope through your mouth into your stomach in order to:    See the lining of the esophagus, stomach, and duodenum (first part of the small intestine).    Look for bleeding, inflammation (swelling), abnormal tumors or tissue, or ulcers.    Take biopsy specimens.  (Biopsies do not hurt.)    TELL YOUR DOCTOR IF:     You have a heart condition, heart murmur, or have had heart valve surgery.    You have had angioplasty with stents put in within the last year.    You are taking blood thinners - Aspirin, Anti-inflammatory pain pills (like Motrin, ibuprofen, Naproxen, Feldene, Advil, etc.), Coumadin, Plavix.    You have diabetes - contact your regular doctor for management of your insulin.    YOU NEED TO:    Have someone drive you home.  You are not allowed to drive until the next day.  (If you take a taxi, the person staying with you after surgery must ride with you in the taxi.  The  is not responsible for you).    Have someone stay with you for four (4) hours after you leave the hospital.    Know the time to be at admitting:  A nurse will call you with the time the afternoon before your procedure.  If your procedure is on Monday, you will be called on Friday.  If you have not been contacted with the time, call the Admitting Department at [phone] or [phone], ext. [phone] after 5 pm (Admitting is open 24 hours daily).    Talk with the Surgery Patient Education Nurses.  Please call them @ [phone] or toll free [phone] after 8:00 a.m. Monday through Friday.    TO PREPARE FOR THE PROCEDURE:      ONE WEEK BEFORE:    Stop Aspirin, anti-inflammatory pain  pills (Motrin, ibuprofen, Naproxen, Feldene, Advil, etc.).  It is OK to take Tylenol (acetaminophen).    Your doctor will tell you what to do if you are on Coumadin or Plavix.     NIGHT BEFORE:    Do not eat or drink anything after midnight.  Your stomach needs to be empty.     DAY OF YOUR PROCEDURE:    Take your pills you were told to take.  Do not take diabetic pills.  If you are on Insulin, take the dose your doctor told you to take.    Wear loose, comfortable clothing and shoes.    Remove ALL jewelry including wedding rings.  Leave valuables at home.    Come to the hospital Admitting Department located at the [facility] on the lower level.    In Same Day surgery, you will be asked to change into an exam gown.    You will be asked your name, birth date, and what you are having done by every person who is involved with your care.    Your health history, medications, and allergies will be reviewed and verified.    An IV (intravenous line) will be started in your hand.  DURING THE UPPER ENDOSCOPY:    Your doctor and a registered nurse will be with you throughout the procedure which takes about 30 minutes.    You will either lie on your left side or on your back.    Medications will be given to you to help you relax and reduce the gag reflex when swallowing the scope.    Your doctor may need to insert air into your stomach to see better.  This may cause fullness or a cramping sensation.  The air will be removed at the end of the exam.    AFTER THE PROCEDURE    You will return to Same Day Surgery to rest for about an hour before you go home.    The doctor will talk with you and your family.    A family member/friend may visit with you.    You may burp up any air remaining in your stomach.    You may feel dizzy or light-headed from the medicine.    Your nurse will go over the discharge instructions with you and your caregiver and answer any of your questions.      You will be contacted the next day to check on how  you are doing.    If biopsies were taken, you will be contacted with the results usually within 3 days.  BACK AT HOME    Rest for an hour or two after you get home.    When your throat is no longer numb and you have a gag reflex, take a few sips of cool water.  If you can swallow comfortably, you may start eating again.    You may have a mild sore throat for the rest of the day.  WHAT TO WATCH FOR:  Problems rarely occur after the exam, but it is important to be aware of the early signs of a complication.  Call your doctor immediately if you have:    Difficulty swallowing or breathing    Unusual pain in your stomach or chest    Vomiting blood or dark material that looks like coffee grounds    Black or tarry stools    Temperature over 100.6 degrees    MORE QUESTIONS?  Please ask your doctor or nurse before the exam begins  or call your doctor at the clinic.    IF YOU MUST CANCEL YOUR PROCEDURE THE EVENING/NIGHT BEFORE, PLEASE CALL HOSPITAL ADMITTING -[phone] OR TOLL FREE [phone], EXT. [phone].    Phone Numbers:  [facility] - [phone] [phone]  [facility] - [phone]  Surgery Patient Education - [phone] or toll free [phone]      Post-Anesthesia Patient Instructions    IMMEDIATELY FOLLOWING SURGERY:  Do not drive or operate machinery for the first twenty four hours after surgery.  Do not make any important decisions for twenty four hours after surgery or while taking narcotic pain medications or sedatives.  If you develop intractable nausea and vomiting or a severe headache please notify your doctor immediately.    FOLLOW-UP:  Please make an appointment with your surgeon as instructed. You do not need to follow up with anesthesia unless specifically instructed to do so.    WOUND CARE INSTRUCTIONS (if applicable):  Keep a dry clean dressing on the anesthesia/puncture wound site if there is drainage.  Once the wound has quit draining you may leave it open to air.  Generally you should  "leave the bandage intact for twenty four hours unless there is drainage.  If the epidural site drains for more than 36-48 hours please call the anesthesia department.    QUESTIONS?:  Please feel free to call your physician or the hospital  if you have any questions, and they will be happy to assist you.       Pending Results     No orders found from 3/3/2018 to 3/6/2018.            Admission Information     Date & Time Provider Department Dept. Phone    3/5/2018 Lorenzo Berry MD [facility] [phone]      Your Vitals Were     Blood Pressure Temperature Height Weight Last Period Pulse Oximetry    96/66 (Cuff Size: Adult Large) 96.9  F (36.1  C) (Oral) 1.727 m (5' 8\") 92.9 kg (204 lb 12.8 oz) 07/12/2017 96%    BMI (Body Mass Index)                   31.14 kg/m2           MyChart Information     [facility] gives you secure access to your electronic health record. If you see a primary care provider, you can also send messages to your care team and make appointments. If you have questions, please call your primary care clinic.  If you do not have a primary care provider, please call [phone] and they will assist you.        Care EveryWhere ID     This is your Care EveryWhere ID. This could be used by other organizations to access your [facility] medical records  [id number]        Equal Access to Services     JEANNE SOLIZ AH: Hadii merritt rando Sostephen, waaxda luqadaha, qaybta kaalmada werner, ángela gill. So [facility] [phone].    ATENCIÓN: Si habla español, tiene a boone disposición servicios gratuitos de asistencia lingüística. Llame al [phone].    We comply with applicable federal civil rights laws and [place] laws. We do not discriminate on the basis of race, color, national origin, age, disability, sex, sexual orientation, or gender identity.               Review of your medicines      CONTINUE these medicines which have NOT CHANGED        Dose / " Directions    cyclobenzaprine 5 MG tablet   Commonly known as:  FLEXERIL   Used for:  Cervical paraspinal muscle spasm        Dose:  5 mg   Take 1 tablet (5 mg) by mouth 3 times daily as needed for muscle spasms   Quantity:  42 tablet   Refills:  1       escitalopram 10 MG tablet   Commonly known as:  LEXAPRO   Used for:  Cervical paraspinal muscle spasm        Dose:  10 mg   Take 1 tablet (10 mg) by mouth daily   Quantity:  30 tablet   Refills:  1       loratadine 10 MG tablet   Commonly known as:  CLARITIN   Indication:  Hayfever        Dose:  10 mg   Take 10 mg by mouth as needed   Refills:  0       zolpidem 6.25 MG CR tablet   Commonly known as:  AMBIEN CR   Used for:  Primary insomnia        TAKE 1 TABLET BY MOUTH NIGHTLY AS NEEDED FOR SLEEP   Quantity:  30 tablet   Refills:  0                Protect others around you: Learn how to safely use, store and throw away your medicines at www.disposemymeds.org.             Medication List: This is a list of all your medications and when to take them. Check marks below indicate your daily home schedule. Keep this list as a reference.      Medications           Morning Afternoon Evening Bedtime As Needed    cyclobenzaprine 5 MG tablet   Commonly known as:  FLEXERIL   Take 1 tablet (5 mg) by mouth 3 times daily as needed for muscle spasms                                escitalopram 10 MG tablet   Commonly known as:  LEXAPRO   Take 1 tablet (10 mg) by mouth daily                                loratadine 10 MG tablet   Commonly known as:  CLARITIN   Take 10 mg by mouth as needed                                zolpidem 6.25 MG CR tablet   Commonly known as:  AMBIEN CR   TAKE 1 TABLET BY MOUTH NIGHTLY AS NEEDED FOR SLEEP

## 2020-04-10 ENCOUNTER — NURSE TRIAGE (OUTPATIENT)
Dept: FAMILY MEDICINE | Facility: OTHER | Age: 55
End: 2020-04-10

## 2020-04-10 ENCOUNTER — VIRTUAL VISIT (OUTPATIENT)
Dept: FAMILY MEDICINE | Facility: OTHER | Age: 55
End: 2020-04-10
Attending: PHYSICIAN ASSISTANT
Payer: COMMERCIAL

## 2020-04-10 VITALS — WEIGHT: 210 LBS | BODY MASS INDEX: 32.89 KG/M2 | TEMPERATURE: 100.6 F

## 2020-04-10 DIAGNOSIS — J02.0 STREPTOCOCCAL PHARYNGITIS: Primary | ICD-10-CM

## 2020-04-10 PROCEDURE — 99214 OFFICE O/P EST MOD 30 MIN: CPT | Mod: TEL | Performed by: PHYSICIAN ASSISTANT

## 2020-04-10 RX ORDER — AZITHROMYCIN 250 MG/1
TABLET, FILM COATED ORAL
Qty: 6 TABLET | Refills: 0 | Status: SHIPPED | OUTPATIENT
Start: 2020-04-10 | End: 2020-04-15

## 2020-04-10 RX ORDER — ASPIRIN 81 MG/1
81 TABLET, CHEWABLE ORAL DAILY
COMMUNITY

## 2020-04-10 RX ORDER — LACTOBACILLUS RHAMNOSUS GG 10B CELL
1 CAPSULE ORAL 2 TIMES DAILY
COMMUNITY
End: 2021-08-12

## 2020-04-10 ASSESSMENT — PAIN SCALES - GENERAL: PAINLEVEL: MODERATE PAIN (4)

## 2020-04-10 NOTE — PROGRESS NOTES
"Subjective     Shweta Harris is a 54 year old female who is being evaluated via a billable telephone visit.      The patient has been notified of following:     \"This telephone visit will be conducted via a call between you and your physician/provider. We have found that certain health care needs can be provided without the need for a physical exam.  This service lets us provide the care you need with a short phone conversation.  If a prescription is necessary we can send it directly to your pharmacy.  If lab work is needed we can place an order for that and you can then stop by our lab to have the test done at a later time.    Telephone visits are billed at different rates depending on your insurance coverage. During this emergency period, for some insurers they may be billed the same as an in-person visit.  Please reach out to your insurance provider with any questions.    If during the course of the call the physician/provider feels a telephone visit is not appropriate, you will not be charged for this service.\"    Patient has given verbal consent for Telephone visit?  Yes    How would you like to obtain your AVS? Mail a copy    Shweta Harris complains of   Chief Complaint   Patient presents with     Back Pain     URI       ALLERGIES  Seasonal allergies    RESPIRATORY SYMPTOMS      Duration: 5 days    Description  sore throat, cough, fever, chills and headache    Accompanying signs and symptoms: SOB a little with activity    History (predisposing factors):  none    Precipitating or alleviating factors: None    Therapies tried and outcome:  Acetaminophen, breathing exercise    Musculoskeletal problem/pain      Duration: 5 days    Description  Location: Middle back across    Accompanying signs and symptoms: none    History  Previous similar problem: YES  Previous evaluation:  none    Precipitating or alleviating factors:  Trauma or overuse: no but did paint rooms in the house and also stated she had to " clean up a lot of water in the b basement  Aggravating factors include: none    Therapies tried and outcome: rest/inactivity and acetaminophen            Patient Active Problem List   Diagnosis     Heterozygous factor V Leiden mutation (H)     ACP (advance care planning)     Endometriosis of pelvis     Adenomyosis     JANEE (generalized anxiety disorder)     Gastroesophageal reflux disease with esophagitis     Psychophysiological insomnia     Past Surgical History:   Procedure Laterality Date     COLONOSCOPY N/A 12/1/2014    Procedure: COLONOSCOPY;  Surgeon: Blanche Trivedi MD;  Location[facility]     CYSTOSCOPY N/A 8/23/2017    Procedure: CYSTOSCOPY;;  Surgeon: Santhosh May MD;  [facility]     ESOPHAGOSCOPY, GASTROSCOPY, DUODENOSCOPY (EGD), COMBINED N/A 3/5/2018    Procedure: COMBINED ESOPHAGOSCOPY, GASTROSCOPY, DUODENOSCOPY (EGD);  UPPER ENDOSCOPY WITH BIOPSY;  Surgeon: Lorenzo Berry MD;  Location: [facility]     GYN SURGERY       HYSTERECTOMY TOTAL ABDOMINAL  8/23/2017    Procedure: HYSTERECTOMY TOTAL ABDOMINAL;;  Surgeon: Santhosh May MD;  [facility]     LAPAROSCOPIC ASSISTED HYSTERECTOMY VAGINAL, BILATERAL SALPINGO-OOPHORECTOMY, COMBINED N/A 8/23/2017    Procedure: COMBINED LAPAROSCOPIC ASSISTED HYSTERECTOMY VAGINAL, SALPINGO-OOPHORECTOMY;  LAPAROSCOPIC ASSISTED VAGINAL HYSTERECTOMY, BILATERAL SALPINGO-OOPHORECTOMY ATTEMPTED, total abdominal hysterctomy with bilateral salpingo-oopherectomy, cystoscopy;  Surgeon: Santhosh May MD;  Location: [facility]     tubal ligation  1991       Social History     Tobacco Use     Smoking status: Never Smoker     Smokeless tobacco: Never Used     Tobacco comment: No passive exposure   Substance Use Topics     Alcohol use: Yes     Comment: Occasionally     Family History   Problem Relation Age of Onset     Asthma Father      Other - See Comments Father         Factor V Leiden (Carrier)     Heart Surgery Father 79        2 stents put in his heart.     Alzheimer Disease  Mother         Lewjesus Body Dementia     Breast Cancer Mother      Diabetes Paternal Grandmother      Diabetes Maternal Grandmother      Other - See Comments Other         Niece, Factor V Leiden     Other - See Comments Daughter         Factor V Leiden     Other - See Comments Other         Nephew, Factor V Leiden, no family hx     Other - See Comments Sister         Factor V Leiden         Current Outpatient Medications   Medication Sig Dispense Refill     aspirin (ASA) 81 MG chewable tablet Take 81 mg by mouth daily       azithromycin (ZITHROMAX) 250 MG tablet Take 2 tablets (500 mg) by mouth daily for 1 day, THEN 1 tablet (250 mg) daily for 4 days. 6 tablet 0     lactobacillus rhamnosus, GG, (CULTURELL) capsule Take 1 capsule by mouth 2 times daily       UNABLE TO FIND Tumeric       UNABLE TO FIND Estrovan       ibuprofen (ADVIL/MOTRIN) 800 MG tablet Take 1 tablet (800 mg) by mouth every 8 hours as needed for moderate pain (Patient not taking: Reported on 4/10/2020) 60 tablet 0     naproxen (NAPROSYN) 500 MG tablet TAKE 1 TABLET BY MOUTH TWICE DAILY AS NEEDED FOR MODERATE PAIN (Patient not taking: Reported on 4/10/2020) 60 tablet 0     Allergies   Allergen Reactions     Seasonal Allergies      Recent Labs   Lab Test 07/12/19  0910 02/11/18  1120 02/05/18  0842  06/21/17  1020   LDL 81  --   --   --  94   HDL 71  --   --   --  67   TRIG 58  --   --   --  45   ALT 45 46 47  --  33   CR 0.78 0.77 0.82   < > 0.78   GFRESTIMATED 85 78 73   < > 77   GFRESTBLACK >90 >90 88   < > >90   GFR Calc     POTASSIUM 4.0 4.1 4.3   < > 3.9   TSH 2.18  --   --   --  2.31    < > = values in this interval not displayed.      BP Readings from Last 3 Encounters:   07/11/19 112/78   12/06/18 124/84   07/12/18 98/68    Wt Readings from Last 3 Encounters:   04/10/20 95.3 kg (210 lb)   07/11/19 92.1 kg (203 lb)   12/06/18 91.2 kg (201 lb)                    Reviewed and updated as needed this visit by Provider         Review  of Systems   ROS COMP: Constitutional, HEENT, cardiovascular, pulmonary, gi and gu systems are negative, except as otherwise noted.       Objective   Reported vitals:  Temp 100.6  F (38.1  C) (Tympanic)   Wt 95.3 kg (210 lb)   LMP 07/12/2017   BMI 32.89 kg/m     healthy, alert and no distress  Psych: Alert and oriented times 3; coherent speech, normal   rate and volume, able to articulate logical thoughts, able   to abstract reason, no tangential thoughts, no hallucinations   or delusions  Her affect is appropriate     Diagnostic Test Results:  Labs reviewed in Epic  No results found for this or any previous visit (from the past 24 hour(s)).        Assessment/Plan:  1. Streptococcal pharyngitis  She has had this fever over 5 days. Aching. Doesn't really meet criteria for testing with current guidelines.  She will quarantine and go to ER if sx are worsening. Will call triage if sx are worsening. Washing hands social distancing is encouraged.   - azithromycin (ZITHROMAX) 250 MG tablet; Take 2 tablets (500 mg) by mouth daily for 1 day, THEN 1 tablet (250 mg) daily for 4 days.  Dispense: 6 tablet; Refill: 0    No follow-ups on file.      Phone call duration:  25 minutes    DESTIN Elizabeth

## 2020-04-10 NOTE — NURSING NOTE
"Chief Complaint   Patient presents with     Back Pain     URI       Initial Temp 100.6  F (38.1  C) (Tympanic)   Wt 95.3 kg (210 lb)   LMP 07/12/2017   BMI 32.89 kg/m   Estimated body mass index is 32.89 kg/m  as calculated from the following:    Height as of 7/11/19: 1.702 m (5' 7\").    Weight as of this encounter: 95.3 kg (210 lb).  Medication Reconciliation: complete  Mady De Leon LPN    "

## 2020-04-10 NOTE — TELEPHONE ENCOUNTER
"Call back [phone]  Daughter and pt on phone, since Monday mid back pain and fever .6, per daughter, \"she did [facility] visit COVID-19 screening yesterday\". \"they don't believe she has COVID due to screening.    Denies cough.   Reports the following: new mild SOB with activity \"when she walks up the stairs she gets winded\", sore throat and HA.     PCP to advise on plan. Pt is requesting to be seen today.   Per protocol due to symptoms recommending a tele visit.   Pt scheduled  Next 5 appointments (look out 90 days)    Apr 10, 2020  9:15 AM CDT  Telephone Visit with HC PROVIDER ROHAN  [facility] ([facility] ) [address]  [place] [address]  [phone]            Reason for Disposition    MILD difficulty breathing (e.g., minimal/no SOB at rest, SOB with walking, pulse <100)    Additional Information    Negative: SEVERE difficulty breathing (e.g., struggling for each breath, speaks in single words)    Negative: Difficult to awaken or acting confused (e.g., disoriented, slurred speech)    Negative: Bluish (or gray) lips or face now    Negative: Shock suspected (e.g., cold/pale/clammy skin, too weak to stand, low BP, rapid pulse)    Negative: Sounds like a life-threatening emergency to the triager    Negative: [1] COVID-19 suspected (e.g., cough, fever, shortness of breath) AND [2] public health department recommends testing    Negative: [1] COVID-19 exposure AND [2] no symptoms    Negative: COVID-19 and Breastfeeding, questions about    Negative: SEVERE or constant chest pain (Exception: mild central chest pain, present only when coughing)    Negative: MODERATE difficulty breathing (e.g., speaks in phrases, SOB even at rest, pulse 100-120)    Negative: Patient sounds very sick or weak to the triager    Answer Assessment - Initial Assessment Questions  1. COVID-19 DIAGNOSIS: \"Who made your Coronavirus (COVID-19) diagnosis?\" \"Was it confirmed by a positive " "lab test?\" If not diagnosed by a HCP, ask \"Are there lots of cases (community spread) where you live?\" (See public health department website, if unsure)    * MAJOR community spread: high number of cases; numbers of cases are increasing; many people hospitalized.    * MINOR community spread: low number of cases; not increasing; few or no people hospitalized      minor  2. ONSET: \"When did the COVID-19 symptoms start?\"       monday  3. WORST SYMPTOM: \"What is your worst symptom?\" (e.g., cough, fever, shortness of breath, muscle aches)      Sore throat, SOB with activity, sore throat  4. COUGH: \"How bad is the cough?\"        denies  5. FEVER: \"Do you have a fever?\" If so, ask: \"What is your temperature, how was it measured, and when did it start?\"      .6  6. RESPIRATORY STATUS: \"Describe your breathing?\" (e.g., shortness of breath, wheezing, unable to speak)       SOB with activity,denies wheezing, speaking with ease   7. BETTER-SAME-WORSE: \"Are you getting better, staying the same or getting worse compared to yesterday?\"  If getting worse, ask, \"In what way?\"      same  8. HIGH RISK DISEASE: \"Do you have any chronic medical problems?\" (e.g., asthma, heart or lung disease, weak immune system, etc.)      denies  9. PREGNANCY: \"Is there any chance you are pregnant?\" \"When was your last menstrual period?\"      no  10. OTHER SYMPTOMS: \"Do you have any other symptoms?\"  (e.g., runny nose, headache, sore throat, loss of smell)        No congestion, HA intermittens, sore throat, denies loss of smell    Protocols used: CORONAVIRUS (COVID-19) DIAGNOSED OR [id number] 3.30.20      "

## 2020-08-23 ENCOUNTER — HOSPITAL ENCOUNTER (EMERGENCY)
Facility: HOSPITAL | Age: 55
Discharge: HOME | End: 2020-08-23
Attending: NURSE PRACTITIONER | Admitting: NURSE PRACTITIONER
Payer: COMMERCIAL

## 2020-08-23 VITALS
OXYGEN SATURATION: 97 % | RESPIRATION RATE: 16 BRPM | WEIGHT: 210 LBS | HEART RATE: 68 BPM | BODY MASS INDEX: 32.89 KG/M2 | TEMPERATURE: 96.1 F

## 2020-08-23 DIAGNOSIS — M62.838 MUSCLE SPASM: ICD-10-CM

## 2020-08-23 DIAGNOSIS — M25.511 ACUTE PAIN OF RIGHT SHOULDER: ICD-10-CM

## 2020-08-23 PROCEDURE — 25000128 H RX IP 250 OP 636: Performed by: NURSE PRACTITIONER

## 2020-08-23 PROCEDURE — G0463 HOSPITAL OUTPT CLINIC VISIT: HCPCS | Mod: 25

## 2020-08-23 PROCEDURE — 99213 OFFICE O/P EST LOW 20 MIN: CPT | Mod: Z6 | Performed by: NURSE PRACTITIONER

## 2020-08-23 PROCEDURE — 96372 THER/PROPH/DIAG INJ SC/IM: CPT

## 2020-08-23 RX ORDER — KETOROLAC TROMETHAMINE 30 MG/ML
60 INJECTION, SOLUTION INTRAMUSCULAR; INTRAVENOUS ONCE
Status: COMPLETED | OUTPATIENT
Start: 2020-08-23 | End: 2020-08-23

## 2020-08-23 RX ORDER — CYCLOBENZAPRINE HCL 5 MG
5 TABLET ORAL 3 TIMES DAILY PRN
Qty: 20 TABLET | Refills: 0 | Status: SHIPPED | OUTPATIENT
Start: 2020-08-23 | End: 2021-08-12

## 2020-08-23 RX ADMIN — KETOROLAC TROMETHAMINE 60 MG: 30 INJECTION, SOLUTION INTRAMUSCULAR at 12:14

## 2020-08-23 ASSESSMENT — ENCOUNTER SYMPTOMS
GASTROINTESTINAL NEGATIVE: 1
HEMATOLOGIC/LYMPHATIC NEGATIVE: 1
CARDIOVASCULAR NEGATIVE: 1
ENDOCRINE NEGATIVE: 1
ALLERGIC/IMMUNOLOGIC NEGATIVE: 1
EYES NEGATIVE: 1
PSYCHIATRIC NEGATIVE: 1
NEUROLOGICAL NEGATIVE: 1
RESPIRATORY NEGATIVE: 1
CONSTITUTIONAL NEGATIVE: 1

## 2020-08-23 NOTE — ED NOTES
Patient reports relief of pain starting.   No reaction noted to medication.     Denies any questions or concerns.   Discharged.     Andreina Duenas LPN on 8/23/2020 at 12:33 PM

## 2020-08-23 NOTE — ED AVS SNAPSHOT
[facility]  [address] [address]  Phone:  [phone]                                    Shweta Harris   MRN: [id number]    Department:  [facility]   Date of Visit:  8/23/2020           After Visit Summary Signature Page    I have received my discharge instructions, and my questions have been answered. I have discussed any challenges I see with this plan with the nurse or doctor.    ..........................................................................................................................................  Patient/Patient Representative Signature      ..........................................................................................................................................  Patient Representative Print Name and Relationship to Patient    ..................................................               ................................................  Date                                   Time    ..........................................................................................................................................  Reviewed by Signature/Title    ...................................................              ..............................................  Date                                               Time          22EPIC Rev 08/18

## 2020-08-23 NOTE — DISCHARGE INSTRUCTIONS
Can try over the counter pain medication (ibuprofen or naproxen)   Can try pain creams to right shoulder    Use flexeril as needed for muscle spasms

## 2020-08-23 NOTE — ED PROVIDER NOTES
History     Chief Complaint   Patient presents with     Shoulder Pain     rt shoulder     The history is provided by the patient.     Shweta Harris is a 54 year old female who presents to the  for right shoulder pain started yesterday.  She denies any injury. She states she is working to hard. She was cleaning a pontoon, estiven, and heavy lifting.  She took some tylenol 1000 mg last night with the increased pain. She is using heat and sat in the hot tub with some relief.  Denies any previous injury to her right shoulder.     Allergies:  Allergies   Allergen Reactions     Seasonal Allergies        Problem List:    Patient Active Problem List    Diagnosis Date Noted     JANEE (generalized anxiety disorder) 04/19/2018     Priority: Medium     Gastroesophageal reflux disease with esophagitis 04/19/2018     Priority: Medium     Psychophysiological insomnia 04/19/2018     Priority: Medium     Adenomyosis 10/06/2017     Priority: Medium     Endometriosis of pelvis 08/23/2017     Priority: Medium     ACP (advance care planning) 06/21/2017     Priority: Medium     Advance Care Planning 6/21/2017: ACP Review of Chart / Resources Provided:  Reviewed chart for advance care plan.  Shweta Harris has been provided information and resources to begin or update their advance care plan.  Added by Abril Peterson             Heterozygous factor V Leiden mutation (H) 10/09/2014     Priority: Medium        Past Medical History:    Past Medical History:   Diagnosis Date     Dermatitis fungal 6/30/2011     Factor V Leiden 6/28/2012     Family history of other blood disorders 6/30/2011     Heterozygous factor V Leiden mutation (H) 10/9/2014     Heterozygous factor V Leiden mutation (H)      Palpitations 9/22/2006       Past Surgical History:    Past Surgical History:   Procedure Laterality Date     COLONOSCOPY N/A 12/1/2014    Procedure: COLONOSCOPY;  Surgeon: Blanche Trivedi MD;  [facility]     CYSTOSCOPY N/A 8/23/2017     Procedure: CYSTOSCOPY;;  Surgeon: Santhosh May MD;  [facility]     ESOPHAGOSCOPY, GASTROSCOPY, DUODENOSCOPY (EGD), COMBINED N/A 3/5/2018    Procedure: COMBINED ESOPHAGOSCOPY, GASTROSCOPY, DUODENOSCOPY (EGD);  UPPER ENDOSCOPY WITH BIOPSY;  Surgeon: Lorenzo Berry MD;  [facility]     GYN SURGERY       HYSTERECTOMY TOTAL ABDOMINAL  8/23/2017    Procedure: HYSTERECTOMY TOTAL ABDOMINAL;;  Surgeon: Santhosh May MD;  [facility]     LAPAROSCOPIC ASSISTED HYSTERECTOMY VAGINAL, BILATERAL SALPINGO-OOPHORECTOMY, COMBINED N/A 8/23/2017    Procedure: COMBINED LAPAROSCOPIC ASSISTED HYSTERECTOMY VAGINAL, SALPINGO-OOPHORECTOMY;  LAPAROSCOPIC ASSISTED VAGINAL HYSTERECTOMY, BILATERAL SALPINGO-OOPHORECTOMY ATTEMPTED, total abdominal hysterctomy with bilateral salpingo-oopherectomy, cystoscopy;  Surgeon: Santhosh May MD;  [facility]: [facility]     tubal ligation  1991       Family History:    Family History   Problem Relation Age of Onset     Asthma Father      Other - See Comments Father         Factor V Leiden (Carrier)     Heart Surgery Father 79        2 stents put in his heart.     Alzheimer Disease Mother         Lewie Body Dementia     Breast Cancer Mother      Diabetes Paternal Grandmother      Diabetes Maternal Grandmother      Other - See Comments Other         Niece, Factor V Leiden     Other - See Comments Daughter         Factor V Leiden     Other - See Comments Other         Nephew, Factor V Leiden, no family hx     Other - See Comments Sister         Factor V Leiden       Social History:  Marital Status:   [4]  Social History     Tobacco Use     Smoking status: Never Smoker     Smokeless tobacco: Never Used     Tobacco comment: No passive exposure   Substance Use Topics     Alcohol use: Yes     Comment: Occasionally     Drug use: No        Medications:    aspirin (ASA) 81 MG chewable tablet  cyclobenzaprine (FLEXERIL) 5 MG tablet  ibuprofen (ADVIL/MOTRIN) 800 MG tablet  lactobacillus  rhamnosus, GG, (CULTURELL) capsule  UNABLE TO FIND  UNABLE TO FIND          Review of Systems   Constitutional: Negative.    HENT: Negative.    Eyes: Negative.    Respiratory: Negative.    Cardiovascular: Negative.    Gastrointestinal: Negative.    Endocrine: Negative.    Genitourinary: Negative.    Musculoskeletal:        Right shoulder pain    Skin: Negative.    Allergic/Immunologic: Negative.    Neurological: Negative.    Hematological: Negative.    Psychiatric/Behavioral: Negative.        Physical Exam   Pulse: 68  Temp: 96.1  F (35.6  C)  Resp: 16  Weight: 95.3 kg (210 lb)  SpO2: 97 %      Physical Exam  Vitals signs and nursing note reviewed.   Constitutional:       Appearance: Normal appearance.   Cardiovascular:      Rate and Rhythm: Normal rate.      Heart sounds: Normal heart sounds.   Pulmonary:      Effort: Pulmonary effort is normal. No respiratory distress.      Breath sounds: Normal breath sounds.   Musculoskeletal:      Right shoulder: She exhibits decreased range of motion, tenderness and spasm. She exhibits no swelling, normal pulse and normal strength.   Skin:     General: Skin is warm and dry.      Capillary Refill: Capillary refill takes less than 2 seconds.   Neurological:      Mental Status: She is alert and oriented to person, place, and time.   Psychiatric:         Mood and Affect: Mood normal.         Behavior: Behavior normal.         ED Course        Procedures               Critical Care time:  none               No results found for this or any previous visit (from the past 24 hour(s)).    Medications   ketorolac (TORADOL) injection 60 mg (60 mg Intramuscular Given 8/23/20 1214)       Assessments & Plan (with Medical Decision Making)     I have reviewed the nursing notes.    I have reviewed the findings, diagnosis, plan and need for follow up with the patient.   No imaging today as she did not have any trauma to the shoulder and feels it is more muscular.  If pain continues she may need  imaging in the future.       Elevate injured area above heart as often as possible and when resting. Take OTC motrin 800 mg every 8 hours as needed for pain/swelling. Apply ice at least three times a day x 20 minutes.   Discussed RICE with rest, ice, compression and elevation  Patient verbally educated and given appropriate education sheets for the diagnoses and has no questions.  Take medications as directed.   Follow up with your Primary Care provider if symptoms increase or if further concerns develop, return to the ER    Can try over the counter pain medication (ibuprofen or naproxen)   Can try pain creams to right shoulder    Use flexeril as needed for muscle spasms     Discharge Medication List as of 8/23/2020 12:29 PM      START taking these medications    Details   cyclobenzaprine (FLEXERIL) 5 MG tablet Take 1 tablet (5 mg) by mouth 3 times daily as needed for muscle spasms, Disp-20 tablet,R-0, E-Prescribe             Final diagnoses:   Acute pain of right shoulder   Muscle spasm       8/23/2020   [facility]     Yesenia Galvan APRN CNP  08/23/20 [id number]

## 2020-08-23 NOTE — ED TRIAGE NOTES
"Patient presents today with c/o right shoulder pain.  Ongoing since yesterday.   States \"its my muscles, they keep tightening up so bad.\"  Denies any specific injury or trauma - states \"overworked.\"  Denies hx of injury or surgeries to right shoulder  Denies numbness or tingling in arm or hand  Able to raise shoulder to mid level - unable to raise above head.   Tried head and hot tub - some relief   Tylenol OTC PRN - minor relief  "

## 2020-10-12 ENCOUNTER — NURSE TRIAGE (OUTPATIENT)
Dept: FAMILY MEDICINE | Facility: OTHER | Age: 55
End: 2020-10-12

## 2020-10-12 ENCOUNTER — OFFICE VISIT (OUTPATIENT)
Dept: FAMILY MEDICINE | Facility: OTHER | Age: 55
End: 2020-10-12
Attending: PHYSICIAN ASSISTANT
Payer: COMMERCIAL

## 2020-10-12 DIAGNOSIS — Z20.822 COVID-19 RULED OUT: Primary | ICD-10-CM

## 2020-10-12 DIAGNOSIS — Z20.822 COVID-19 RULED OUT: ICD-10-CM

## 2020-10-12 PROCEDURE — U0003 INFECTIOUS AGENT DETECTION BY NUCLEIC ACID (DNA OR RNA); SEVERE ACUTE RESPIRATORY SYNDROME CORONAVIRUS 2 (SARS-COV-2) (CORONAVIRUS DISEASE [COVID-19]), AMPLIFIED PROBE TECHNIQUE, MAKING USE OF HIGH THROUGHPUT TECHNOLOGIES AS DESCRIBED BY CMS-2020-01-R: HCPCS | Performed by: PHYSICIAN ASSISTANT

## 2020-10-12 NOTE — TELEPHONE ENCOUNTER
Pt called, requesting covid testing. Reports sore throat that started 2 days. Denies fever, cough. Pt also states that she was exposed to her son in law with a confirmed positive for covid. Scheduled for curbside testing. Advised to quarantine per recommendations from MD/CDC, symptomatic treatment, call back with change or worsening in symptoms. Pt verbalizes understanding.       Reason for Disposition    [1] COVID-19 infection suspected by caller or triager AND [2] mild symptoms (cough, fever, or others) AND [3] no complications or SOB    Additional Information    Negative: SEVERE difficulty breathing (e.g., struggling for each breath, speaks in single words)    Negative: Difficult to awaken or acting confused (e.g., disoriented, slurred speech)    Negative: Bluish (or gray) lips or face now    Negative: Shock suspected (e.g., cold/pale/clammy skin, too weak to stand, low BP, rapid pulse)    Negative: Sounds like a life-threatening emergency to the triager    Negative: [1] COVID-19 exposure AND [2] no symptoms    Negative: COVID-19 and Breastfeeding, questions about    Negative: [1] Adult with possible COVID-19 symptoms AND [2] triager concerned about severity of symptoms or other causes    Negative: SEVERE or constant chest pain or pressure (Exception: mild central chest pain, present only when coughing)    Negative: MODERATE difficulty breathing (e.g., speaks in phrases, SOB even at rest, pulse 100-120)    Negative: Patient sounds very sick or weak to the triager    Negative: MILD difficulty breathing (e.g., minimal/no SOB at rest, SOB with walking, pulse <100)    Negative: Chest pain or pressure    Negative: Fever > 103 F (39.4 C)    Negative: [1] Fever > 101 F (38.3 C) AND [2] age > 60    Negative: [1] Fever > 100.0 F (37.8 C) AND [2] bedridden (e.g., nursing home patient, CVA, chronic illness, recovering from surgery)    Negative: HIGH RISK patient (e.g., age > 64 years, diabetes, heart or lung disease, weak  "immune system) (Exception: Has already been evaluated by healthcare provider and has no new or worsening symptoms)    Negative: Fever present > 3 days (72 hours)    Negative: [1] Fever returns after gone for over 24 hours AND [2] symptoms worse or not improved    Negative: [1] Continuous (nonstop) coughing interferes with work or school AND [2] no improvement using cough treatment per protocol    Answer Assessment - Initial Assessment Questions  1. COVID-19 DIAGNOSIS: \"Who made your Coronavirus (COVID-19) diagnosis?\" \"Was it confirmed by a positive lab test?\" If not diagnosed by a HCP, ask \"Are there lots of cases (community spread) where you live?\" (See public health department website, if unsure)      Not confirmed  2. ONSET: \"When did the COVID-19 symptoms start?\"       2 days ago  3. WORST SYMPTOM: \"What is your worst symptom?\" (e.g., cough, fever, shortness of breath, muscle aches)      Sore throat  4. COUGH: \"Do you have a cough?\" If so, ask: \"How bad is the cough?\"        no  5. FEVER: \"Do you have a fever?\" If so, ask: \"What is your temperature, how was it measured, and when did it start?\"      no  6. RESPIRATORY STATUS: \"Describe your breathing?\" (e.g., shortness of breath, wheezing, unable to speak)       no  7. BETTER-SAME-WORSE: \"Are you getting better, staying the same or getting worse compared to yesterday?\"  If getting worse, ask, \"In what way?\"      same  8. HIGH RISK DISEASE: \"Do you have any chronic medical problems?\" (e.g., asthma, heart or lung disease, weak immune system, etc.)      no  9. PREGNANCY: \"Is there any chance you are pregnant?\" \"When was your last menstrual period?\"      no  10. OTHER SYMPTOMS: \"Do you have any other symptoms?\"  (e.g., chills, fatigue, headache, loss of smell or taste, muscle pain, sore throat)        Sore throat    Protocols used: CORONAVIRUS (COVID-19) DIAGNOSED OR [id number] 8.4.20      "

## 2020-10-13 LAB
SARS-COV-2 RNA SPEC QL NAA+PROBE: NOT DETECTED
SPECIMEN SOURCE: NORMAL

## 2020-11-30 ENCOUNTER — NURSE TRIAGE (OUTPATIENT)
Dept: FAMILY MEDICINE | Facility: OTHER | Age: 55
End: 2020-11-30

## 2020-11-30 ENCOUNTER — OFFICE VISIT (OUTPATIENT)
Dept: FAMILY MEDICINE | Facility: OTHER | Age: 55
End: 2020-11-30
Attending: PHYSICIAN ASSISTANT
Payer: COMMERCIAL

## 2020-11-30 DIAGNOSIS — Z20.822 COVID-19 RULED OUT: ICD-10-CM

## 2020-11-30 DIAGNOSIS — Z20.822 COVID-19 RULED OUT: Primary | ICD-10-CM

## 2020-11-30 DIAGNOSIS — R07.0 THROAT PAIN: Primary | ICD-10-CM

## 2020-11-30 LAB
SPECIMEN SOURCE: NORMAL
STREP GROUP A PCR: NOT DETECTED

## 2020-11-30 PROCEDURE — 87651 STREP A DNA AMP PROBE: CPT | Performed by: PHYSICIAN ASSISTANT

## 2020-11-30 PROCEDURE — U0003 INFECTIOUS AGENT DETECTION BY NUCLEIC ACID (DNA OR RNA); SEVERE ACUTE RESPIRATORY SYNDROME CORONAVIRUS 2 (SARS-COV-2) (CORONAVIRUS DISEASE [COVID-19]), AMPLIFIED PROBE TECHNIQUE, MAKING USE OF HIGH THROUGHPUT TECHNOLOGIES AS DESCRIBED BY CMS-2020-01-R: HCPCS | Performed by: PHYSICIAN ASSISTANT

## 2020-11-30 NOTE — TELEPHONE ENCOUNTER
"Sore throat starting on 11/29/20 with fever (temp. 101 on 11/29/20, 100.0 on 11/30/20 with Tylenol at 5 am).     No appts open in clinic. Pt requesting covid testing and will see a provider if negative to determine if she has strep throat.     Next 5 appointments (look out 90 days)    Nov 30, 2020  3:30 PM  (Arrive by 3:15 PM)  SHORT with [facility] COLLE[facility] [place] ([facility] [place] ) [address]  [place] [address]  [phone]            Additional Information    Negative: Severe difficulty breathing (e.g., struggling for each breath, speaks in single words)    Negative: Sounds like a life-threatening emergency to the triager    Negative: Throat culture results, call about    Negative: Productive cough is the main symptom    Negative: Runny nose is the main symptom    Negative: Drooling or spitting out saliva (because can't swallow)    Negative: Unable to open mouth completely    Negative: Drinking very little and has signs of dehydration (e.g., no urine > 12 hours, very dry mouth, very lightheaded)    Negative: Patient sounds very sick or weak to the triager    Negative: Difficulty breathing (per caller) but not severe    Negative: Fever > 103 F (39.4 C)    Negative: Refuses to drink anything for > 12 hours    Patient wants to be seen    Negative: History of rheumatic fever    Negative: Diabetes mellitus or weak immune system (e.g., HIV positive, cancer chemo, splenectomy, organ transplant, chronic steroids)    Negative: Widespread rash (especially chest and abdomen)    Negative: Earache also present    Negative: Pus on tonsils (back of throat) and swollen neck lymph nodes ('glands')    Negative: SEVERE sore throat pain    Answer Assessment - Initial Assessment Questions  1. ONSET: \"When did the throat start hurting?\" (Hours or days ago)       11/29/20    2. SEVERITY: \"How bad is the sore throat?\" (Scale 1-10; mild, moderate or severe)    - MILD (1-3):  doesn't " "interfere with eating or normal activities    - MODERATE (4-7): interferes with eating some solids and normal activities    - SEVERE (8-10):  excruciating pain, interferes with most normal activities    - SEVERE DYSPHAGIA: can't swallow liquids, drooling      Moderate, 4/10    3. STREP EXPOSURE: \"Has there been any exposure to strep within the past week?\" If so, ask: \"What type of contact occurred?\"       No known exposure    4.  VIRAL SYMPTOMS: \"Are there any symptoms of a cold, such as a runny nose, cough, hoarse voice or red eyes?\"       Fever, no other cold symptoms     5. FEVER: \"Do you have a fever?\" If so, ask: \"What is your temperature, how was it measured, and when did it start?\"      Yes, temp 100.0 this am ,  101 on 11/29/20      6. PUS ON THE TONSILS: \"Is there pus on the tonsils in the back of your throat?\"      Pt reports she has not looked at back of throat     7. OTHER SYMPTOMS: \"Do you have any other symptoms?\" (e.g., difficulty breathing, headache, rash)      No     8. PREGNANCY: \"Is there any chance you are pregnant?\" \"When was your last menstrual period?\"      No    Protocols used: SORE THROAT-A-OH      "

## 2020-12-03 LAB
SARS-COV-2 RNA SPEC QL NAA+PROBE: ABNORMAL
SPECIMEN SOURCE: ABNORMAL

## 2020-12-17 ENCOUNTER — ANCILLARY PROCEDURE (OUTPATIENT)
Dept: MAMMOGRAPHY | Facility: OTHER | Age: 55
End: 2020-12-17
Attending: PHYSICIAN ASSISTANT
Payer: COMMERCIAL

## 2020-12-17 DIAGNOSIS — Z12.31 VISIT FOR SCREENING MAMMOGRAM: ICD-10-CM

## 2020-12-17 PROCEDURE — 77067 SCR MAMMO BI INCL CAD: CPT | Mod: TC | Performed by: RADIOLOGY

## 2020-12-17 PROCEDURE — 77063 BREAST TOMOSYNTHESIS BI: CPT | Mod: TC | Performed by: RADIOLOGY

## 2021-07-06 ENCOUNTER — NURSE TRIAGE (OUTPATIENT)
Dept: FAMILY MEDICINE | Facility: OTHER | Age: 56
End: 2021-07-06

## 2021-07-06 NOTE — TELEPHONE ENCOUNTER
"Protocol advises patient to be seen within 3 days for a sore throat. Patient is scheduled tomorrow with covering provider.    Reason for Disposition    [1] Sore throat is the only symptom AND [2] present > 48 hours    Additional Information    Negative: Severe difficulty breathing (e.g., struggling for each breath, speaks in single words, stridor)    Negative: Sounds like a life-threatening emergency to the triager    Negative: [1] Diagnosed strep throat AND [2] taking antibiotic AND [3] symptoms continue    Negative: Throat culture results, call about    Negative: Productive cough is main symptom    Negative: Non-productive cough is main symptom    Negative: Hoarseness is main symptom    Negative: Runny nose is main symptom    Negative: [1] Drooling or spitting out saliva (because can't swallow) AND [2] normal breathing    Negative: Unable to open mouth completely    Negative: [1] Difficulty breathing AND [2] not severe    Negative: Fever > 104 F (40 C)    Negative: [1] Refuses to drink anything AND [2] for > 12 hours    Negative: [1] Drinking very little AND [2] dehydration suspected (e.g., no urine > 12 hours, very dry mouth, very lightheaded)    Negative: Patient sounds very sick or weak to the triager    Negative: SEVERE (e.g., excruciating) throat pain    Negative: [1] Pus on tonsils (back of throat) AND [2]  fever AND [3] swollen neck lymph nodes (\"glands\")    Negative: [1] Rash AND [2] widespread (especially chest and abdomen)    Negative: Earache also present    Negative: Fever present > 3 days (72 hours)    Negative: Diabetes mellitus or weak immune system (e.g., HIV positive, cancer chemo, splenectomy, organ transplant, chronic steroids)    Negative: History of rheumatic fever    Negative: [1] Adult is leaving on a trip AND [2] requests an antibiotic NOW    Negative: [1] Positive throat culture or rapid strep test (according to lab, PCP, caller, etc.) AND [2] NO  standing order to call in prescription for " "antibiotic    Negative: [1] Exposure to family member (or spouse or boyfriend/girlfriend) with test-proven strep AND [2] within last 10 days    Answer Assessment - Initial Assessment Questions  1. ONSET: \"When did the throat start hurting?\" (Hours or days ago)       Last Wednesday   2. SEVERITY: \"How bad is the sore throat?\" (Scale 1-10; mild, moderate or severe)    - MILD (1-3):  doesn't interfere with eating or normal activities    - MODERATE (4-7): interferes with eating some solids and normal activities    - SEVERE (8-10):  excruciating pain, interferes with most normal activities    - SEVERE DYSPHAGIA: can't swallow liquids, drooling      5/10  3. STREP EXPOSURE: \"Has there been any exposure to strep within the past week?\" If so, ask: \"What type of contact occurred?\"       no  4.  VIRAL SYMPTOMS: \"Are there any symptoms of a cold, such as a runny nose, cough, hoarse voice or red eyes?\"       Cough, hoarse voice, body aches  5. FEVER: \"Do you have a fever?\" If so, ask: \"What is your temperature, how was it measured, and when did it start?\"      no  6. PUS ON THE TONSILS: \"Is there pus on the tonsils in the back of your throat?\"      Haven't looked  7. OTHER SYMPTOMS: \"Do you have any other symptoms?\" (e.g., difficulty breathing, headache, rash)      headache  8. PREGNANCY: \"Is there any chance you are pregnant?\" \"When was your last menstrual period?\"      No    Protocols used: SORE THROAT-A-AH      "

## 2021-07-07 ENCOUNTER — OFFICE VISIT (OUTPATIENT)
Dept: FAMILY MEDICINE | Facility: OTHER | Age: 56
End: 2021-07-07
Attending: FAMILY MEDICINE
Payer: COMMERCIAL

## 2021-07-07 VITALS
HEART RATE: 70 BPM | SYSTOLIC BLOOD PRESSURE: 94 MMHG | TEMPERATURE: 96.8 F | RESPIRATION RATE: 18 BRPM | BODY MASS INDEX: 32.58 KG/M2 | DIASTOLIC BLOOD PRESSURE: 72 MMHG | OXYGEN SATURATION: 98 % | WEIGHT: 208 LBS

## 2021-07-07 DIAGNOSIS — J04.0 LARYNGITIS: Primary | ICD-10-CM

## 2021-07-07 DIAGNOSIS — J06.9 VIRAL URI: ICD-10-CM

## 2021-07-07 DIAGNOSIS — G47.09 OTHER INSOMNIA: ICD-10-CM

## 2021-07-07 PROCEDURE — 99214 OFFICE O/P EST MOD 30 MIN: CPT | Performed by: FAMILY MEDICINE

## 2021-07-07 ASSESSMENT — PAIN SCALES - GENERAL: PAINLEVEL: NO PAIN (0)

## 2021-07-07 NOTE — PROGRESS NOTES
Assessment & Plan     Laryngitis  Second to viral illness or PND    1.  Nasal saline rinse/spray 2-3x/day (brands:  Ocean, Ayr, Jenaro-Med, etc)  2.  Afrin nasal spray (only use 3 days, not longer!)  1-2x/day  3.  claritin or zrytec daily (always buy cheapest brand)      Viral URI  As above    Other insomnia  See patient instructions  Follow-up with primary in 4-5 wks          Patient was agreeable to this plan and had no further questions.  Patient Instructions     1.  Nasal saline rinse/spray 2-3x/day (brands:  Ocean, Ayr, Jenaro-Med, etc)  2.  Afrin nasal spray (only use 3 days, not longer!)  1-2x/day  3.  claritin or zrytec daily (always buy cheapest brand)    (alavert)      Sleep!  1.  Melatonin  5-10 mg 1 hr before sleep  2.  b complex daily  3.  Turn off screens 1 hr before bed or get blue light blockers glasses  4.  CBD -- [facility] -- [place]      No follow-ups on file.    Delia Gill MD  [facility]   Shweta is a 55 year old who presents for the following health issues     HPI     Acute Illness  Acute illness concerns: 1 wk  Onset/Duration: Week  Symptoms:  Fever: no  Chills/Sweats: no  Headache (location?): YES- Frontal lobe/Mild  Sinus Pressure: no  Conjunctivitis:  YES- Right  Ear Pain: YES: both  Rhinorrhea: YES- intermittently  Congestion: YES  Sore Throat: YES- Mild  Cough: YES-non-productive  Wheeze: no  Decreased Appetite: no  Nausea: no  Vomiting: no  Diarrhea: no  Dysuria/Freq.: no  Dysuria or Hematuria: no  Fatigue/Achiness: no  Sick/Strep Exposure: no  Therapies tried and outcome: Tylenol, warm salt water gargle - temporarily effective     Insomnia  Onset: years    Description:   Time to fall asleep (sleep latency): 30 minutes  Middle of night awakening:  YES  Early morning awakening:  YES    Progression of Symptoms:  waxing and waning    Accompanying Signs & Symptoms:  Daytime sleepiness/napping: YES  Excessive snoring/apnea: unknown  Restless  legs: no  Frequent urination: no  Chronic pain:  no    History:  Prior Insomnia: YES    Precipitating factors:   New stressful situation: no  Caffeine intake: no  OTC decongestants: no  Any new medications: no    Alleviating factors:  Self medicating (alcohol, etc.):  no    Therapies Tried and outcome: melatonin      Review of Systems   Constitutional, HEENT, cardiovascular, pulmonary, gi and gu systems are negative, except as otherwise noted.      Objective    LMP 07/12/2017   There is no height or weight on file to calculate BMI.  Physical Exam   GENERAL: healthy, alert and no distress  HENT: ear canals and TM's normal, nose and mouth without ulcers or lesions  NECK: no adenopathy, no asymmetry, masses, or scars and thyroid normal to palpation  RESP: lungs clear to auscultation - no rales, rhonchi or wheezes  CV: regular rate and rhythm, normal S1 S2, no S3 or S4, no murmur, click or rub, no peripheral edema and peripheral pulses strong  ABDOMEN: soft, nontender, no hepatosplenomegaly, no masses and bowel sounds normal  MS: no gross musculoskeletal defects noted, no edema  PSYCH: mentation appears normal, affect normal/bright    No results found for any visits on 07/07/21.

## 2021-07-07 NOTE — PATIENT INSTRUCTIONS
1.  Nasal saline rinse/spray 2-3x/day (brands:  Ocean, Ayr, Jenaro-Med, etc)  2.  Afrin nasal spray (only use 3 days, not longer!)  1-2x/day  3.  claritin or zrytec daily (always buy cheapest brand)    (alavert)      Sleep!  1.  Melatonin  5-10 mg 1 hr before sleep  2.  b complex daily  3.  Turn off screens 1 hr before bed or get blue light blockers glasses  4.  CBD -- [facility] -- [place]

## 2021-07-07 NOTE — NURSING NOTE
"Chief Complaint   Patient presents with     URI       Initial BP 94/72   Pulse 70   Temp 96.8  F (36  C)   Resp 18   Wt 94.3 kg (208 lb)   LMP 07/12/2017   SpO2 98%   BMI 32.58 kg/m   Estimated body mass index is 32.58 kg/m  as calculated from the following:    Height as of 7/11/19: 1.702 m (5' 7\").    Weight as of this encounter: 94.3 kg (208 lb).  Medication Reconciliation: gem Trivedi  "

## 2021-08-10 NOTE — PROGRESS NOTES
Assessment & Plan   1. Primary insomnia  Trial of trazodone for sleep. Follow up in 4-6 weeks if not improving Consider sleep study if no improvement with trazadone.  - traZODone (DESYREL) 50 MG tablet; One half to one pill at bedtime as needed for sleep  Dispense: 30 tablet; Refill: 4    2. Extensor tendinitis of foot  Continue rest, ice, and NSAID's. Discussed wearing proper foot support. If no improvement at next visit, consider imaging.        See Patient Instructions    Follow up in 6 weeks for insomnia/extensor tendonitis of foot.    Abby Milton PA-S  DESTIN Elizabeth  I saw and examined this patient.  I have read through the note and made appropriate changes and agree with the  assessment and plan.     [facility] - ELISABETH Rock is a 55 year old who presents for the following health issues:    HPI     Insomnia  Onset/Duration: At least 10 years,   Description: She does not have dreams. Hours of sleep a night around 5-6  Frequency of insomnia:  several times a week  Time to fall asleep (sleep latency): 30 minutes  Middle of night awakening:  YES  Early morning awakening:  YES  Progression of Symptoms:  improving  Accompanying Signs & Symptoms:   Daytime sleepiness/napping: YES if not busy  Excessive snoring/apnea: no  Restless legs: YES rare  Waking to urinate: no  Chronic pain:  no  Depression symptoms (if yes, do PHQ9): YES  Anxiety symptoms (if yes, do JANEE-7): no  History:  Prior Insomnia: YES  New stressful situation: no  Precipitating factors:   Caffeine intake: no  OTC decongestants: no allergy pill  Any new medications: no  Alleviating factors:  Self medicating (alcohol, etc.):  no  Stress-reduction (exercise, yoga, meditation etc): no  Therapies tried and outcome: melatonin 10 mg and super B complex, helping some but not a lot. Helps fall asleep but not stay asleep. When has night time awakenings she is wide awake.   She has purchased blue light glasses to cut  out blue light but still watches TV. For about 1 hours. Has not had a sleep.   Mother had lewy body dementia.     Dorsal foot pain  Onset: one year.   Description: Tops of feet feet tight/stiff and painful over great toe tendons. Full ROM  Intensity: mild  Progression of Symptoms:  same  Accompanying Signs & Symptoms: edema  Previous history of similar problem: plantar fascitis resolved with lifestyle modifications.  Precipitating factors:        Worsened by: prolonged standingw/walking. In the am. Walking barefoot.  Alleviating factors:        Improved by: ice, elevation, ibuprofen  Therapies tried and outcome: None      Review of Systems   Constitutional, HEENT, cardiovascular, pulmonary, gi and gu systems are negative, except as otherwise noted.      Objective    /70   Pulse 78   Temp 97  F (36.1  C) (Tympanic)   Wt 91.9 kg (202 lb 9.6 oz)   LMP 07/12/2017   SpO2 97%   BMI 31.73 kg/m    Body mass index is 31.73 kg/m .     Physical Exam   GENERAL: Shweta appears healthy, alert and in no acute distress  EYES: Eyes grossly normal to inspection, PERRL and conjunctivae and sclerae clear  HENT: Head is normocephalic and atraumatic. External ears were without lesions, masses, erythema or drainage. No tenderness to palpation or manipulation bilateral. TM's appear grey and translucent without lesions, erythema, masses or fluid in the middle ear space. Nasal septum midline, mucosa appears pink and wet with no erythema, lesions or masses. Oral mucosa pink and wet without lesions, masses or erythema including gums/buccal mucosa. Pharynx mucosa appears pink and wet without lesions, masses, erythema or postnasal drainage. No tonsillar hypertrophy or exudates.  NECK: no adenopathy, no asymmetry, masses, or scars. No thyromegaly, no masses/nodules to palpation.  RESP: lungs clear to auscultation, no increased work in breathing. Regular rate, rhythm, depth and ease - no rales, rhonchi or wheezes  CV: regular rate and  rhythm, clear S1 S2, no extra heart sounds. No murmrus, rubs or gallops. No peripheral edema and peripheral pulses strong  MS: no gross musculoskeletal defects noted, no edema, erythema or warmth to the touch. Full active ROM.  SKIN: Appears well perfused and dry to the touch. No suspicious lesions or rashes  NEURO: Normal strength and tone, mentation intact and speech normal  PSYCH: mentation appears normal, affect normal/bright

## 2021-08-12 ENCOUNTER — OFFICE VISIT (OUTPATIENT)
Dept: FAMILY MEDICINE | Facility: OTHER | Age: 56
End: 2021-08-12
Attending: PHYSICIAN ASSISTANT
Payer: COMMERCIAL

## 2021-08-12 VITALS
TEMPERATURE: 97 F | BODY MASS INDEX: 31.73 KG/M2 | HEART RATE: 78 BPM | SYSTOLIC BLOOD PRESSURE: 104 MMHG | WEIGHT: 202.6 LBS | OXYGEN SATURATION: 97 % | DIASTOLIC BLOOD PRESSURE: 70 MMHG

## 2021-08-12 DIAGNOSIS — F51.01 PRIMARY INSOMNIA: Primary | ICD-10-CM

## 2021-08-12 DIAGNOSIS — M77.8 EXTENSOR TENDINITIS OF FOOT: ICD-10-CM

## 2021-08-12 PROCEDURE — 99213 OFFICE O/P EST LOW 20 MIN: CPT | Performed by: PHYSICIAN ASSISTANT

## 2021-08-12 RX ORDER — MULTIVITAMIN WITH IRON
TABLET ORAL
COMMUNITY
Start: 2021-08-11

## 2021-08-12 RX ORDER — ZINC OXIDE 13 %
CREAM (GRAM) TOPICAL
COMMUNITY
Start: 2021-08-11

## 2021-08-12 RX ORDER — TRAZODONE HYDROCHLORIDE 50 MG/1
TABLET, FILM COATED ORAL
Qty: 30 TABLET | Refills: 4 | Status: SHIPPED | OUTPATIENT
Start: 2021-08-12 | End: 2021-09-23 | Stop reason: ALTCHOICE

## 2021-08-12 ASSESSMENT — ANXIETY QUESTIONNAIRES
5. BEING SO RESTLESS THAT IT IS HARD TO SIT STILL: NOT AT ALL
6. BECOMING EASILY ANNOYED OR IRRITABLE: NOT AT ALL
2. NOT BEING ABLE TO STOP OR CONTROL WORRYING: NOT AT ALL
GAD7 TOTAL SCORE: 0
4. TROUBLE RELAXING: NOT AT ALL
7. FEELING AFRAID AS IF SOMETHING AWFUL MIGHT HAPPEN: NOT AT ALL
3. WORRYING TOO MUCH ABOUT DIFFERENT THINGS: NOT AT ALL
1. FEELING NERVOUS, ANXIOUS, OR ON EDGE: NOT AT ALL
IF YOU CHECKED OFF ANY PROBLEMS ON THIS QUESTIONNAIRE, HOW DIFFICULT HAVE THESE PROBLEMS MADE IT FOR YOU TO DO YOUR WORK, TAKE CARE OF THINGS AT HOME, OR GET ALONG WITH OTHER PEOPLE: NOT DIFFICULT AT ALL

## 2021-08-12 ASSESSMENT — PAIN SCALES - GENERAL: PAINLEVEL: MODERATE PAIN (4)

## 2021-08-12 ASSESSMENT — PATIENT HEALTH QUESTIONNAIRE - PHQ9: SUM OF ALL RESPONSES TO PHQ QUESTIONS 1-9: 2

## 2021-08-12 NOTE — NURSING NOTE
"Chief Complaint   Patient presents with     Insomnia       Initial LMP 07/12/2017  Estimated body mass index is 32.58 kg/m  as calculated from the following:    Height as of 7/11/19: 1.702 m (5' 7\").    Weight as of 7/7/21: 94.3 kg (208 lb).  Medication Reconciliation: complete  Joe Dumont LPN  "

## 2021-08-13 ASSESSMENT — ANXIETY QUESTIONNAIRES: GAD7 TOTAL SCORE: 0

## 2021-09-23 ENCOUNTER — ANCILLARY PROCEDURE (OUTPATIENT)
Dept: GENERAL RADIOLOGY | Facility: OTHER | Age: 56
End: 2021-09-23
Attending: PHYSICIAN ASSISTANT
Payer: COMMERCIAL

## 2021-09-23 ENCOUNTER — OFFICE VISIT (OUTPATIENT)
Dept: FAMILY MEDICINE | Facility: OTHER | Age: 56
End: 2021-09-23
Attending: PHYSICIAN ASSISTANT
Payer: COMMERCIAL

## 2021-09-23 VITALS
SYSTOLIC BLOOD PRESSURE: 100 MMHG | DIASTOLIC BLOOD PRESSURE: 72 MMHG | WEIGHT: 202 LBS | BODY MASS INDEX: 31.64 KG/M2 | OXYGEN SATURATION: 98 % | HEART RATE: 75 BPM | TEMPERATURE: 97.4 F

## 2021-09-23 DIAGNOSIS — N39.46 MIXED INCONTINENCE: ICD-10-CM

## 2021-09-23 DIAGNOSIS — R05.9 COUGH: ICD-10-CM

## 2021-09-23 DIAGNOSIS — R05.9 COUGH: Primary | ICD-10-CM

## 2021-09-23 LAB
ALBUMIN UR-MCNC: NEGATIVE MG/DL
APPEARANCE UR: CLEAR
BILIRUB UR QL STRIP: NEGATIVE
COLOR UR AUTO: YELLOW
GLUCOSE UR STRIP-MCNC: NEGATIVE MG/DL
HGB UR QL STRIP: NEGATIVE
KETONES UR STRIP-MCNC: NEGATIVE MG/DL
LEUKOCYTE ESTERASE UR QL STRIP: NEGATIVE
NITRATE UR QL: NEGATIVE
PH UR STRIP: 7 [PH] (ref 4.7–8)
SP GR UR STRIP: 1.02 (ref 1–1.03)
UROBILINOGEN UR STRIP-MCNC: NORMAL MG/DL

## 2021-09-23 PROCEDURE — 81003 URINALYSIS AUTO W/O SCOPE: CPT | Performed by: PHYSICIAN ASSISTANT

## 2021-09-23 PROCEDURE — 99214 OFFICE O/P EST MOD 30 MIN: CPT | Performed by: PHYSICIAN ASSISTANT

## 2021-09-23 PROCEDURE — 71046 X-RAY EXAM CHEST 2 VIEWS: CPT | Mod: TC | Performed by: RADIOLOGY

## 2021-09-23 RX ORDER — DESLORATADINE 5 MG/1
5 TABLET ORAL DAILY
Qty: 30 TABLET | Refills: 4 | Status: SHIPPED | OUTPATIENT
Start: 2021-09-23 | End: 2021-10-26

## 2021-09-23 RX ORDER — FLUTICASONE PROPIONATE 50 MCG
1 SPRAY, SUSPENSION (ML) NASAL DAILY
Qty: 18.2 ML | Refills: 11 | Status: SHIPPED | OUTPATIENT
Start: 2021-09-23 | End: 2021-10-26

## 2021-09-23 ASSESSMENT — ANXIETY QUESTIONNAIRES
7. FEELING AFRAID AS IF SOMETHING AWFUL MIGHT HAPPEN: NOT AT ALL
6. BECOMING EASILY ANNOYED OR IRRITABLE: NOT AT ALL
3. WORRYING TOO MUCH ABOUT DIFFERENT THINGS: NOT AT ALL
4. TROUBLE RELAXING: NOT AT ALL
1. FEELING NERVOUS, ANXIOUS, OR ON EDGE: NOT AT ALL
5. BEING SO RESTLESS THAT IT IS HARD TO SIT STILL: NOT AT ALL
2. NOT BEING ABLE TO STOP OR CONTROL WORRYING: NOT AT ALL
GAD7 TOTAL SCORE: 0

## 2021-09-23 ASSESSMENT — PAIN SCALES - GENERAL: PAINLEVEL: NO PAIN (0)

## 2021-09-23 ASSESSMENT — PATIENT HEALTH QUESTIONNAIRE - PHQ9: SUM OF ALL RESPONSES TO PHQ QUESTIONS 1-9: 0

## 2021-09-23 NOTE — NURSING NOTE
"Chief Complaint   Patient presents with     Sleep Problem       Initial Blood Pressure 100/72 (BP Location: Left arm, Patient Position: Sitting, Cuff Size: Adult Regular)   Pulse 75   Temperature 97.4  F (36.3  C) (Tympanic)   Weight 91.6 kg (202 lb)   Last Menstrual Period 07/12/2017   Oxygen Saturation 98%   Body Mass Index 31.64 kg/m   Estimated body mass index is 31.64 kg/m  as calculated from the following:    Height as of 7/11/19: 1.702 m (5' 7\").    Weight as of this encounter: 91.6 kg (202 lb).  Medication Reconciliation: complete  Agueda Ramires LPN  "

## 2021-09-23 NOTE — PROGRESS NOTES
Assessment & Plan     Cough  Really irritating, no rhyme or reason.  Dry not productive  Deep and harsh. Try allergy medication and see ENT.   - Otolaryngology Referral  - desloratadine (CLARINEX) 5 MG tablet; Take 1 tablet (5 mg) by mouth daily  - fluticasone (FLONASE) 50 MCG/ACT nasal spray; Spray 1 spray into both nostrils daily  - XR CHEST 2 VW (Clinic Performed); Future  - General PFT Lab (Please always keep checked); Future  - Pulmonary Function Test; Future  - General PFT Lab (Please always keep checked)    Mixed incontinence  Has gotten worse over the years.    - Adult Urology Referral; Future  - UA reflex to Microscopic and Culture - [place]; Future    Review of external notes as documented elsewhere in note  Ordering of each unique test  Prescription drug management  5 minutes spent on the date of the encounter doing patient visit and documentation        See Patient Instructions    No follow-ups on file.    DESTIN Elizabeth  [facility] - [place]    Subjective   Shweta is a 56 year old who presents for the following health issues     HPI     Concern - Insomnia  Onset: ongoing  Description: follow up from 8/12/2021  Intensity: mild  Progression of Symptoms:  improving  Accompanying Signs & Symptoms: none  Previous history of similar problem: none  Precipitating factors:        Worsened by: none  Alleviating factors:        Improved by: none  Therapies tried and outcome: try trazodone did not help she only tried it a few times, is currently taking melatonin and it seems to be helping her.     Concern - bad coughing all her life.   Onset: many years ago.   Description: hard cough that causes pain along bra line. Dad has a cough like this worried about emphysema   Intensity: moderate  Progression of Symptoms:  same  Accompanying Signs & Symptoms: dry cough  That causes her to pee her pants   Previous history of similar problem: always   Precipitating factors:        Worsened by: any  stress.   Alleviating factors:        Improved by: cough drop   Therapies tried and outcome:  none     URINARY TRACT SYMPTOMS      Duration: has been having incontinence over the last 5 years.      Description  frequency, urgency and incontinence    Intensity:  Severe, seems to be worse after hysterectomy.     Accompanying signs and symptoms:  Fever/chills: no   Flank pain no   Nausea and vomiting: no   Vaginal symptoms: none  Abdominal/Pelvic Pain: no     History  History of frequent UTI's: no   History of kidney stones: no   Sexually Active: YES  Possibility of pregnancy: No    Precipitating or alleviating factors: None    Therapies tried and outcome: none   Outcome: wearing pads and trying to limit water.        Review of Systems   Constitutional, HEENT, cardiovascular, pulmonary, gi and gu systems are negative, except as otherwise noted.      Objective    /72 (BP Location: Left arm, Patient Position: Sitting, Cuff Size: Adult Regular)   Pulse 75   Temp 97.4  F (36.3  C) (Tympanic)   Wt 91.6 kg (202 lb)   LMP 07/12/2017   SpO2 98%   BMI 31.64 kg/m    Body mass index is 31.64 kg/m .  Physical Exam   GENERAL: healthy, alert and no distress  EYES: Eyes grossly normal to inspection, PERRL and conjunctivae and sclerae normal  HENT: ear canals and TM's normal, nose and mouth without ulcers or lesions  NECK: no adenopathy, no asymmetry, masses, or scars and thyroid normal to palpation  RESP: lungs clear to auscultation - no rales, rhonchi or wheezes  CV: regular rate and rhythm, normal S1 S2, no S3 or S4, no murmur, click or rub, no peripheral edema and peripheral pulses strong  SKIN: no suspicious lesions or rashes  PSYCH: mentation appears normal, affect normal/bright    Office Visit on 11/30/2020   Component Date Value Ref Range Status     COVID-19 Virus PCR to [facility] - So* 11/30/2020 Nasopharyngeal   Final     COVID-19 Virus PCR to [facility] - Re* 11/30/2020 Detected, Abnormal Result*  Final    Comment:  Positive for 2019-nCoV.  Patient sample was heat inactivated and amplified using the HDPCR SARS-CoV-2   assay ([employer] The HDPCRTM SARS-CoV-2 assay is a reverse   transcription real-time polymerase chain reaction (qRT-PCR) test intended for   the qualitative detection of nucleic acid  from SARS-CoV-2 in human nasopharyngeal swabs, oropharyngeal swabs, anterior   nasal swabs, mid-turbinate nasal swabs as well as nasal aspirate, nasal wash,   and bronchoalveolar lavage (BAL) specimens from individuals who are suspected   of COVID-19 by their healthcare provider.  Positive results should also be reported in accordance with local, state, and   federal regulations.  Nasopharyngeal specimen is the preferred choice for swab-based SARS CoV2   testing. When collection of a nasopharyngeal swab is not possible the   following are acceptable alternatives:  an oropharyngeal (OP) specimen collected by a healthcare professional, or a   nasal mid-turbinate (NMT) swab collected by a healthcar                           e professional or by   onsite self-collection (using a flocked tapered swab), or an anterior nares   specimen collected by a healthcare professional or by onsite self-collection   (using a round foam swab). (Centers for Disease Control)  Testing performed by [facility] ([facility]) [address] [place] [address]  The test performance characteristics were determined by [facility]. It has not been   cleared or approved by the FDA.  The laboratory is regulated under the Clinical Laboratory Improvement   Amendments of 1988 (CLIA-88) as qualified to perform high-complexity testing.   This test is used for clinical purposes. It should not be regarded as   investigational or for research.       Specimen Description 11/30/2020 Throat   Final     Strep Group A PCR 11/30/2020 Not Detected  NDET^Not Detected Final    Comment: Group A Streptococcus DNA  is not detected.  FDA approved assay performed using [employer] Gene[employer]pert real-time PCR.

## 2021-09-23 NOTE — PATIENT INSTRUCTIONS
Thank you for choosing [facility].   I have office hours 8:00 am to 4:30 pm on Monday's, Wednesday's, Thursday's and Friday's. My nurse and I are out of the office every Tuesday.    Following your visit, when your labs and diagnostic testing have returned, I will review then and you will be contacted by my nurse.  If you are on My Chart, you can also view results there.    For refills, notify your pharmacy regarding what you need and the pharmacy will generate a refill request. Do not call my nurse as she is unable to process refill request. Please plan ahead and allow 3-5 days for refill requests.    You will generally receive a reminder call the day prior to your appointment.  If you cannot attend your appointment, please cancel your appointment with as much notice as possible.  If there is a pattern of failure to present for your appointments, I cannot provide consistent, meaningful, ongoing care for you. It is very important to me that you come in for your care, so we can best assist you with your health care needs.    IMPORTANT:  Please note that it is my standard of practice to NOT participate in prescribing ongoing requested Narcotic Analgesic therapy, and/or participate in the prescribing of other controlled substances.  My nurse and I am happy to assist you with the process of referral for alternative pain management as needed, and other treatment modalities including but not limited to:  Physical Therapy, Physical Medicine and Rehab, Counseling, Chiropractic Care, Orthopedic Care, and non-narcotic medication management.     In the event that you need to be seen for emergent concerns and I am out of office,  please see one of my colleagues for acute concerns.  You may also present to  or ER.  I appreciate the opportunity to serve you and look forward to supporting your healthcare needs in the future. Please contact me with any questions or concerns that you may  have.    Sincerely,      Antionette Edward RN, PA-C

## 2021-09-24 ASSESSMENT — ANXIETY QUESTIONNAIRES: GAD7 TOTAL SCORE: 0

## 2021-09-27 ENCOUNTER — NURSE TRIAGE (OUTPATIENT)
Dept: FAMILY MEDICINE | Facility: OTHER | Age: 56
End: 2021-09-27

## 2021-09-27 NOTE — TELEPHONE ENCOUNTER
Pt reports she is going to a music concert and is asking for a covid test. Doesn't meet our parameters for testing gave pt other testing site options

## 2021-10-26 ENCOUNTER — OFFICE VISIT (OUTPATIENT)
Dept: OTOLARYNGOLOGY | Facility: OTHER | Age: 56
End: 2021-10-26
Attending: PHYSICIAN ASSISTANT
Payer: COMMERCIAL

## 2021-10-26 VITALS
OXYGEN SATURATION: 98 % | SYSTOLIC BLOOD PRESSURE: 108 MMHG | WEIGHT: 205 LBS | DIASTOLIC BLOOD PRESSURE: 68 MMHG | BODY MASS INDEX: 31.07 KG/M2 | HEART RATE: 74 BPM | TEMPERATURE: 97.2 F | HEIGHT: 68 IN

## 2021-10-26 DIAGNOSIS — K21.9 LPRD (LARYNGOPHARYNGEAL REFLUX DISEASE): ICD-10-CM

## 2021-10-26 DIAGNOSIS — R05.9 COUGH: Primary | ICD-10-CM

## 2021-10-26 DIAGNOSIS — R09.82 POST-NASAL DRAINAGE: ICD-10-CM

## 2021-10-26 DIAGNOSIS — J34.3 NASAL TURBINATE HYPERTROPHY: ICD-10-CM

## 2021-10-26 PROCEDURE — 99214 OFFICE O/P EST MOD 30 MIN: CPT | Mod: 25 | Performed by: PHYSICIAN ASSISTANT

## 2021-10-26 PROCEDURE — 31575 DIAGNOSTIC LARYNGOSCOPY: CPT | Performed by: PHYSICIAN ASSISTANT

## 2021-10-26 RX ORDER — OMEPRAZOLE 40 MG/1
40 CAPSULE, DELAYED RELEASE ORAL DAILY
Qty: 30 CAPSULE | Refills: 1 | Status: SHIPPED | OUTPATIENT
Start: 2021-10-26 | End: 2023-03-31

## 2021-10-26 RX ORDER — FLUTICASONE PROPIONATE 50 MCG
1 SPRAY, SUSPENSION (ML) NASAL DAILY
Qty: 18.2 ML | Refills: 11 | Status: SHIPPED | OUTPATIENT
Start: 2021-10-26

## 2021-10-26 ASSESSMENT — PAIN SCALES - GENERAL: PAINLEVEL: NO PAIN (0)

## 2021-10-26 ASSESSMENT — MIFFLIN-ST. JEOR: SCORE: 1558.37

## 2021-10-26 NOTE — PATIENT INSTRUCTIONS
Continue with antihistamine  Start Flonase 2 sprays to each nostril daily.   Complete pulmonary testing.   Start Prilosec 40 mg one day.   Increase water intake  Follow reflux precautions.     Follow up in 4-6 weeks for recheck.         Thank you for allowing Adenike Cantrell PA-C and our ENT team to participate in your care.  If your medications are too expensive, please give the nurse a call.  We can possibly change this medication.  If you have a scheduling or an appointment question please contact our Health Unit Coordinator at [phone], Ext. [phone].    ALL nursing questions or concerns can be directed to your ENT nurse at: [phone] [facility]      Adult lifestyle changes to prevent LPR reviewed      Avoid eating and drinking within two to three hours prior to bedtime    Do not drink alcohol    Eat small meals and slowly    Limit problem foods:    o Caffeine  o Carbonated drinks  o Chocolate  o Peppermint  o Tomato  o Citrus fruits  o Fatty and fried foods      Lose weight    Quit smoking    Wear loose clothing

## 2021-10-26 NOTE — LETTER
10/26/2021         RE: Shweta Harris  [address] [facility] [id number]        Dear Colleague,    Thank you for referring your patient, Shweta Harris, to the [facility]. Please see a copy of my visit note below.    Otolaryngology Consultation    Patient: Shweta Harris  : 1965    Patient presents with:  Consult: Pt has been referred by Antionette Edward for cough.      HPI:  Shweta Harris is a 56 year old female seen today for cough.   Patient was last seen by her primary care provider on 21 for continued concerns of cough. She had symptoms developing earlier this summer. She had noted ongoing coughing which caused increase pain along her bra line, dry cough, mixed incontinence related to coughing.   Chest X ray was completed and negative.     Shweta returns for continued concerns of coughing. Reports it all day long, reports she has cough drops with some relief. She has worsening cough over the last few months. Dry cough, throat irritation. Cough occurs randomly not provoked by activity.     She has tried AH without relief.   She has upcoming pulmonary testing on .       Patient denies sore throat or throat pain.  No chronic throat clearing.   No aphonia.   Denies chronic otalgia.   Denies fevers or weight loss. Noted night sweats for years.   Denies dysphagia or dysphonia.   + globus sensation.    No chronic nasal congestion.   She has increase in post nasal drainage.         Water- 3 glasses.   Caffeine- No coffee.   ETOH- Rare  Tobacco- No. Never.   Reflux- Hx of reflux. She reduced coffee. Reports reflux intermittently.   Testing in 2018- BS, EGD completed which was normal.         Current Outpatient Rx   Medication Sig Dispense Refill     Ascorbic Acid (VITAMIN C) 500 MG CHEW        aspirin (ASA) 81 MG chewable tablet Take 81 mg by mouth daily       B Complex-Biotin-FA (B COMPLETE PO)        cholecalciferol (VITAMIN D3) 25 mcg (1000 units) capsule         desloratadine (CLARINEX) 5 MG tablet Take 1 tablet (5 mg) by mouth daily 30 tablet 4     diphenhydrAMINE HCl (GNP ALLERGY ANTIHISTAMINE PO) 10mg       fluticasone (FLONASE) 50 MCG/ACT nasal spray Spray 1 spray into both nostrils daily 18.2 mL 11     magnesium 250 MG tablet        Prenatal Vit-Fe Fumarate-FA (M-VIT PO)        Probiotic Product (PROBIOTIC DAILY) CAPS          Allergies: Seasonal allergies     Past Medical History:   Diagnosis Date     Dermatitis fungal 6/30/2011     Factor V Leiden 6/28/2012     Family history of other blood disorders 6/30/2011     Heterozygous factor V Leiden mutation (H) 10/9/2014     Heterozygous factor V Leiden mutation (H)      Palpitations 9/22/2006       Past Surgical History:   Procedure Laterality Date     COLONOSCOPY N/A 12/1/2014    Procedure: COLONOSCOPY;  Surgeon: Blanche Trivedi MD;  Location: [facility]     CYSTOSCOPY N/A 8/23/2017    Procedure: CYSTOSCOPY;;  Surgeon: Santhosh May MD;  Location: [facility]     ESOPHAGOSCOPY, GASTROSCOPY, DUODENOSCOPY (EGD), COMBINED N/A 3/5/2018    Procedure: COMBINED ESOPHAGOSCOPY, GASTROSCOPY, DUODENOSCOPY (EGD);  UPPER ENDOSCOPY WITH BIOPSY;  Surgeon: Lorenzo Berry MD;  Location: [facility]     GYN SURGERY       HYSTERECTOMY TOTAL ABDOMINAL  8/23/2017    Procedure: HYSTERECTOMY TOTAL ABDOMINAL;;  Surgeon: Santhosh May MD;  Location: [facility]     LAPAROSCOPIC ASSISTED HYSTERECTOMY VAGINAL, BILATERAL SALPINGO-OOPHORECTOMY, COMBINED N/A 8/23/2017    Procedure: COMBINED LAPAROSCOPIC ASSISTED HYSTERECTOMY VAGINAL, SALPINGO-OOPHORECTOMY;  LAPAROSCOPIC ASSISTED VAGINAL HYSTERECTOMY, BILATERAL SALPINGO-OOPHORECTOMY ATTEMPTED, total abdominal hysterctomy with bilateral salpingo-oopherectomy, cystoscopy;  Surgeon: Santhosh May MD;  Location: [facility]     tubal ligation  1991       ENT family history reviewed    Social History     Tobacco Use     Smoking status: Never Smoker     Smokeless tobacco: Never Used     Tobacco comment: No passive exposure  "  Substance Use Topics     Alcohol use: Yes     Comment: Occasionally     Drug use: No       Review of Systems  ROS: 10 point ROS neg other than the symptoms noted above in the HPI     Physical Exam  /68 (Cuff Size: Adult Regular)   Pulse 74   Temp 97.2  F (36.2  C) (Tympanic)   Ht 1.727 m (5' 8\")   Wt 93 kg (205 lb)   LMP 07/12/2017   SpO2 98%   BMI 31.17 kg/m    General - The patient is well nourished and well developed, and appears to have good nutritional status.  Alert and oriented to person and place, answers questions and cooperates with examination appropriately.   Head and Face - Normocephalic and atraumatic, with no gross asymmetry noted.  The facial nerve is intact, with strong symmetric movements.  Voice and Breathing - The patient was breathing comfortably without the use of accessory muscles. There was no wheezing, stridor, or stertor.  The patients voice was clear and strong, and had appropriate pitch and quality.  Ears -The external auditory canals are patent, the tympanic membranes are intact without effusion, retraction or mass.  Bony landmarks are intact.  Eyes - Extraocular movements intact, and the pupils were reactive to light.  Sclera were not icteric or injected, conjunctiva were pink and moist.  Mouth - Examination of the oral cavity showed pink, healthy oral mucosa. No lesions or ulcerations noted.  The tongue was mobile and midline, and the dentition were in good condition.    Throat - The walls of the oropharynx were smooth, pink, moist, symmetric, and had no lesions or ulcerations.  The tonsillar pillars and soft palate were symmetric.  The uvula was midline on elevation.    Neck - Normal midline excursion of the laryngotracheal complex during swallowing.  Full range of motion on passive movement.  Palpation of the occipital, submental, submandibular, internal jugular chain, and supraclavicular nodes did not demonstrate any abnormal lymph nodes or masses.  Palpation of the " thyroid was soft and smooth, with no nodules or goiter appreciated.  The trachea was mobile and midline.  Nose - External contour is symmetric, no gross deflection or scars.  Nasal mucosa is pink and moist with no abnormal mucus.      Flexible Endoscopy -    Attempts at mirror laryngoscopy were not possible due to gag reflex.  Therefore I proceeded with a fiberoptic examination.  First I sprayed both sides of the nose with a mixture of lidocaine and neosynephrine.  I then passed the scope through the nasal cavity.    The nasal cavity was unremarkable.  The nasopharynx was mucosally covered and symmetric. Clear drainage throughout. The Eustachian tube openings were unobstructed.  Going further down I had a clear view of the base of tongue, which had normal appearing lingual tonsillar tissue.  The base of tongue was free of lesions, and the vallecula was open.  The epiglottis was smooth and mucosally covered.  The supraglottic larynx was then clearly visualized.  The vocal cords moved smoothly and symmetrically, they were slightly edematous but pearly white and no lesions were seen.  I did note a significant amount of edema and redundant mucosa in the interarytenoid soft tissues and posterior surface of the larynx.  The pyriform sinuses were open, and the limited view of the postcricoid region did not show any erosive or mass lesions.       ASSESSMENT:    ICD-10-CM    1. Cough  R05.9 omeprazole (PRILOSEC) 40 MG DR capsule     fluticasone (FLONASE) 50 MCG/ACT nasal spray   2. Post-nasal drainage  R09.82 fluticasone (FLONASE) 50 MCG/ACT nasal spray   3. Nasal turbinate hypertrophy  J34.3    4. LPRD (laryngopharyngeal reflux disease)  K21.9          Continue with antihistamine  Start Flonase 2 sprays to each nostril daily.   Complete pulmonary testing.     Start Prilosec 40 mg one day.   Increase water intake  Follow reflux precautions.     Follow up in 4-6 weeks for recheck.   Discussed further options of neurogenic  cough, but requested PFT be completed prior to start of medication. She agrees with this plan.     Modifications in diet and behavior were discussed.  These include avoiding fatty foods, fewer acidic foods, limiting alcohol, caffeine, chocolate and fizzy beverages.  There should be nothing to eat or drink 3 hours before going to bed.  Elevating the head of the bed is beneficial as well.  Excess weight, tobacco use, and aspirin or ibuprofen can exacerbate this condition as well.      Medical management can include H-2 blockers or hydrogen ion pump inhibitors are helpful as well.  Improvement in a patient's condition can take 6 weeks.  If symptoms are not resolved gastroscopy, 24 hour pH probe and esophageal manometry could be completed.       Adenike Cantrell PA-C  [facility], [place]          Again, thank you for allowing me to participate in the care of your patient.        Sincerely,        Adenike Cantrell PA-C

## 2021-10-26 NOTE — NURSING NOTE
"Chief Complaint   Patient presents with     Consult     Pt has been referred by Antionette Edward for cough.       Initial /68 (Cuff Size: Adult Regular)   Pulse 74   Temp 97.2  F (36.2  C) (Tympanic)   Ht 1.727 m (5' 8\")   Wt 93 kg (205 lb)   LMP 07/12/2017   SpO2 98%   BMI 31.17 kg/m   Estimated body mass index is 31.17 kg/m  as calculated from the following:    Height as of this encounter: 1.727 m (5' 8\").    Weight as of this encounter: 93 kg (205 lb).  Medication Reconciliation: complete  Siena Valdovinos LPN    "

## 2021-10-26 NOTE — PROGRESS NOTES
Otolaryngology Consultation    Patient: Shweta Harris  : 1965    Patient presents with:  Consult: Pt has been referred by Antionette Edward for cough.      HPI:  Shweta Harris is a 56 year old female seen today for cough.   Patient was last seen by her primary care provider on 21 for continued concerns of cough. She had symptoms developing earlier this summer. She had noted ongoing coughing which caused increase pain along her bra line, dry cough, mixed incontinence related to coughing.   Chest X ray was completed and negative.     Shweta returns for continued concerns of coughing. Reports it all day long, reports she has cough drops with some relief. She has worsening cough over the last few months. Dry cough, throat irritation. Cough occurs randomly not provoked by activity.     She has tried AH without relief.   She has upcoming pulmonary testing on .       Patient denies sore throat or throat pain.  No chronic throat clearing.   No aphonia.   Denies chronic otalgia.   Denies fevers or weight loss. Noted night sweats for years.   Denies dysphagia or dysphonia.   + globus sensation.    No chronic nasal congestion.   She has increase in post nasal drainage.         Water- 3 glasses.   Caffeine- No coffee.   ETOH- Rare  Tobacco- No. Never.   Reflux- Hx of reflux. She reduced coffee. Reports reflux intermittently.   Testing in 2018- BS, EGD completed which was normal.         Current Outpatient Rx   Medication Sig Dispense Refill     Ascorbic Acid (VITAMIN C) 500 MG CHEW        aspirin (ASA) 81 MG chewable tablet Take 81 mg by mouth daily       B Complex-Biotin-FA (B COMPLETE PO)        cholecalciferol (VITAMIN D3) 25 mcg (1000 units) capsule        desloratadine (CLARINEX) 5 MG tablet Take 1 tablet (5 mg) by mouth daily 30 tablet 4     diphenhydrAMINE HCl (GNP ALLERGY ANTIHISTAMINE PO) 10mg       fluticasone (FLONASE) 50 MCG/ACT nasal spray Spray 1 spray into both nostrils daily 18.2 mL 11      magnesium 250 MG tablet        Prenatal Vit-Fe Fumarate-FA (M-VIT PO)        Probiotic Product (PROBIOTIC DAILY) CAPS          Allergies: Seasonal allergies     Past Medical History:   Diagnosis Date     Dermatitis fungal 6/30/2011     Factor V Leiden 6/28/2012     Family history of other blood disorders 6/30/2011     Heterozygous factor V Leiden mutation (H) 10/9/2014     Heterozygous factor V Leiden mutation (H)      Palpitations 9/22/2006       Past Surgical History:   Procedure Laterality Date     COLONOSCOPY N/A 12/1/2014    Procedure: COLONOSCOPY;  Surgeon: Blanche Trivedi MD;  [facility]     CYSTOSCOPY N/A 8/23/2017    Procedure: CYSTOSCOPY;;  Surgeon: Santhosh May MD;  [facility]     ESOPHAGOSCOPY, GASTROSCOPY, DUODENOSCOPY (EGD), COMBINED N/A 3/5/2018    Procedure: COMBINED ESOPHAGOSCOPY, GASTROSCOPY, DUODENOSCOPY (EGD);  UPPER ENDOSCOPY WITH BIOPSY;  Surgeon: Lorenzo Berry MD;  [facility]     GYN SURGERY       HYSTERECTOMY TOTAL ABDOMINAL  8/23/2017    Procedure: HYSTERECTOMY TOTAL ABDOMINAL;;  Surgeon: Santhosh May MD;  [facility]     LAPAROSCOPIC ASSISTED HYSTERECTOMY VAGINAL, BILATERAL SALPINGO-OOPHORECTOMY, COMBINED N/A 8/23/2017    Procedure: COMBINED LAPAROSCOPIC ASSISTED HYSTERECTOMY VAGINAL, SALPINGO-OOPHORECTOMY;  LAPAROSCOPIC ASSISTED VAGINAL HYSTERECTOMY, BILATERAL SALPINGO-OOPHORECTOMY ATTEMPTED, total abdominal hysterctomy with bilateral salpingo-oopherectomy, cystoscopy;  Surgeon: Santhosh May MD;  [facility]     tubal ligation  1991       ENT family history reviewed    Social History     Tobacco Use     Smoking status: Never Smoker     Smokeless tobacco: Never Used     Tobacco comment: No passive exposure   Substance Use Topics     Alcohol use: Yes     Comment: Occasionally     Drug use: No       Review of Systems  ROS: 10 point ROS neg other than the symptoms noted above in the HPI     Physical Exam  /68 (Cuff Size: Adult Regular)   Pulse 74    "Temp 97.2  F (36.2  C) (Tympanic)   Ht 1.727 m (5' 8\")   Wt 93 kg (205 lb)   LMP 07/12/2017   SpO2 98%   BMI 31.17 kg/m    General - The patient is well nourished and well developed, and appears to have good nutritional status.  Alert and oriented to person and place, answers questions and cooperates with examination appropriately.   Head and Face - Normocephalic and atraumatic, with no gross asymmetry noted.  The facial nerve is intact, with strong symmetric movements.  Voice and Breathing - The patient was breathing comfortably without the use of accessory muscles. There was no wheezing, stridor, or stertor.  The patients voice was clear and strong, and had appropriate pitch and quality.  Ears -The external auditory canals are patent, the tympanic membranes are intact without effusion, retraction or mass.  Bony landmarks are intact.  Eyes - Extraocular movements intact, and the pupils were reactive to light.  Sclera were not icteric or injected, conjunctiva were pink and moist.  Mouth - Examination of the oral cavity showed pink, healthy oral mucosa. No lesions or ulcerations noted.  The tongue was mobile and midline, and the dentition were in good condition.    Throat - The walls of the oropharynx were smooth, pink, moist, symmetric, and had no lesions or ulcerations.  The tonsillar pillars and soft palate were symmetric.  The uvula was midline on elevation.    Neck - Normal midline excursion of the laryngotracheal complex during swallowing.  Full range of motion on passive movement.  Palpation of the occipital, submental, submandibular, internal jugular chain, and supraclavicular nodes did not demonstrate any abnormal lymph nodes or masses.  Palpation of the thyroid was soft and smooth, with no nodules or goiter appreciated.  The trachea was mobile and midline.  Nose - External contour is symmetric, no gross deflection or scars.  Nasal mucosa is pink and moist with no abnormal mucus.      Flexible Endoscopy " -    Attempts at mirror laryngoscopy were not possible due to gag reflex.  Therefore I proceeded with a fiberoptic examination.  First I sprayed both sides of the nose with a mixture of lidocaine and neosynephrine.  I then passed the scope through the nasal cavity.    The nasal cavity was unremarkable.  The nasopharynx was mucosally covered and symmetric. Clear drainage throughout. The Eustachian tube openings were unobstructed.  Going further down I had a clear view of the base of tongue, which had normal appearing lingual tonsillar tissue.  The base of tongue was free of lesions, and the vallecula was open.  The epiglottis was smooth and mucosally covered.  The supraglottic larynx was then clearly visualized.  The vocal cords moved smoothly and symmetrically, they were slightly edematous but pearly white and no lesions were seen.  I did note a significant amount of edema and redundant mucosa in the interarytenoid soft tissues and posterior surface of the larynx.  The pyriform sinuses were open, and the limited view of the postcricoid region did not show any erosive or mass lesions.       ASSESSMENT:    ICD-10-CM    1. Cough  R05.9 omeprazole (PRILOSEC) 40 MG DR capsule     fluticasone (FLONASE) 50 MCG/ACT nasal spray   2. Post-nasal drainage  R09.82 fluticasone (FLONASE) 50 MCG/ACT nasal spray   3. Nasal turbinate hypertrophy  J34.3    4. LPRD (laryngopharyngeal reflux disease)  K21.9          Continue with antihistamine  Start Flonase 2 sprays to each nostril daily.   Complete pulmonary testing.     Start Prilosec 40 mg one day.   Increase water intake  Follow reflux precautions.     Follow up in 4-6 weeks for recheck.   Discussed further options of neurogenic cough, but requested PFT be completed prior to start of medication. She agrees with this plan.     Modifications in diet and behavior were discussed.  These include avoiding fatty foods, fewer acidic foods, limiting alcohol, caffeine, chocolate and fizzy  beverages.  There should be nothing to eat or drink 3 hours before going to bed.  Elevating the head of the bed is beneficial as well.  Excess weight, tobacco use, and aspirin or ibuprofen can exacerbate this condition as well.      Medical management can include H-2 blockers or hydrogen ion pump inhibitors are helpful as well.  Improvement in a patient's condition can take 6 weeks.  If symptoms are not resolved gastroscopy, 24 hour pH probe and esophageal manometry could be completed.       Adenike Cantrell PA-C  ENT  [facility], [place]

## 2021-11-01 DIAGNOSIS — N39.46 MIXED INCONTINENCE: Primary | ICD-10-CM

## 2021-11-09 ENCOUNTER — HOSPITAL ENCOUNTER (OUTPATIENT)
Dept: RESPIRATORY THERAPY | Facility: HOSPITAL | Age: 56
End: 2021-11-09
Attending: PHYSICIAN ASSISTANT
Payer: COMMERCIAL

## 2021-11-09 ENCOUNTER — OFFICE VISIT (OUTPATIENT)
Dept: UROLOGY | Facility: OTHER | Age: 56
End: 2021-11-09
Attending: PHYSICIAN ASSISTANT
Payer: COMMERCIAL

## 2021-11-09 ENCOUNTER — LAB (OUTPATIENT)
Dept: LAB | Facility: OTHER | Age: 56
End: 2021-11-09
Payer: COMMERCIAL

## 2021-11-09 VITALS
DIASTOLIC BLOOD PRESSURE: 78 MMHG | BODY MASS INDEX: 31.52 KG/M2 | HEIGHT: 68 IN | SYSTOLIC BLOOD PRESSURE: 128 MMHG | HEART RATE: 76 BPM | OXYGEN SATURATION: 99 % | WEIGHT: 208 LBS | TEMPERATURE: 97.4 F

## 2021-11-09 DIAGNOSIS — N39.3 FEMALE STRESS INCONTINENCE: Primary | ICD-10-CM

## 2021-11-09 DIAGNOSIS — R05.9 COUGH: ICD-10-CM

## 2021-11-09 DIAGNOSIS — N39.46 MIXED INCONTINENCE: ICD-10-CM

## 2021-11-09 LAB
ALBUMIN UR-MCNC: 10 MG/DL
APPEARANCE UR: CLEAR
BILIRUB UR QL STRIP: NEGATIVE
COLOR UR AUTO: YELLOW
GLUCOSE UR STRIP-MCNC: NEGATIVE MG/DL
HGB BLD-MCNC: 12.9 G/DL (ref 11.7–15.7)
HGB UR QL STRIP: NEGATIVE
KETONES UR STRIP-MCNC: NEGATIVE MG/DL
LEUKOCYTE ESTERASE UR QL STRIP: ABNORMAL
MUCOUS THREADS #/AREA URNS LPF: PRESENT /LPF
NITRATE UR QL: NEGATIVE
PH UR STRIP: 6 [PH] (ref 4.7–8)
RBC URINE: <1 /HPF
SP GR UR STRIP: 1.03 (ref 1–1.03)
SQUAMOUS EPITHELIAL: 0 /HPF
UROBILINOGEN UR STRIP-MCNC: NORMAL MG/DL
WBC URINE: 2 /HPF

## 2021-11-09 PROCEDURE — 87086 URINE CULTURE/COLONY COUNT: CPT

## 2021-11-09 PROCEDURE — 94729 DIFFUSING CAPACITY: CPT

## 2021-11-09 PROCEDURE — 94010 BREATHING CAPACITY TEST: CPT | Mod: 26 | Performed by: INTERNAL MEDICINE

## 2021-11-09 PROCEDURE — 81001 URINALYSIS AUTO W/SCOPE: CPT

## 2021-11-09 PROCEDURE — 94010 BREATHING CAPACITY TEST: CPT

## 2021-11-09 PROCEDURE — 85018 HEMOGLOBIN: CPT | Performed by: PHYSICIAN ASSISTANT

## 2021-11-09 PROCEDURE — 36415 COLL VENOUS BLD VENIPUNCTURE: CPT | Performed by: PHYSICIAN ASSISTANT

## 2021-11-09 PROCEDURE — 94726 PLETHYSMOGRAPHY LUNG VOLUMES: CPT

## 2021-11-09 PROCEDURE — 99203 OFFICE O/P NEW LOW 30 MIN: CPT | Performed by: UROLOGY

## 2021-11-09 PROCEDURE — 94729 DIFFUSING CAPACITY: CPT | Mod: 26 | Performed by: INTERNAL MEDICINE

## 2021-11-09 PROCEDURE — 94726 PLETHYSMOGRAPHY LUNG VOLUMES: CPT | Mod: 26 | Performed by: INTERNAL MEDICINE

## 2021-11-09 ASSESSMENT — MIFFLIN-ST. JEOR: SCORE: 1571.98

## 2021-11-09 ASSESSMENT — PAIN SCALES - GENERAL: PAINLEVEL: NO PAIN (0)

## 2021-11-09 NOTE — LETTER
11/9/2021       RE: Shweta Harris  [address] [facility] [id number]     Dear Colleague,    Thank you for referring your patient, Shweta Harris, to the [facility]. Please see a copy of my visit note below.      History     Chief Complaint:      Consult (Mixed incontinence-Antionette dee is referring- happens when she coughs)      HPI   Shweta Harris is a 56 year old female who presents with a history of urinary incontinence.  Shweta said she has had leakage for many years.  Her leakage occurs primarily with a cough.  She denies any urgency or urge incontinence.  For a while she had a very bad cough and recently was started on some omeprazole and her cough is much better so her leakage is less.  She used to wear a large thick pad now she can get by with just a light day pad.  She has had a hysterectomy.  2 vaginal deliveries.  Urinary frequency is every 3-4 hours in the day nocturia 0-1.  No blood in the urine and no urinary tract infection history.    Allergies:    Allergies   Allergen Reactions     Seasonal Allergies         Medications:      Ascorbic Acid (VITAMIN C) 500 MG CHEW  aspirin (ASA) 81 MG chewable tablet  B Complex-Biotin-FA (B COMPLETE PO)  cholecalciferol (VITAMIN D3) 25 mcg (1000 units) capsule  diphenhydrAMINE HCl (GNP ALLERGY ANTIHISTAMINE PO)  fluticasone (FLONASE) 50 MCG/ACT nasal spray  magnesium 250 MG tablet  omeprazole (PRILOSEC) 40 MG DR capsule  Prenatal Vit-Fe Fumarate-FA (M-VIT PO)  Probiotic Product (PROBIOTIC DAILY) CAPS        Problem List:      Patient Active Problem List    Diagnosis Date Noted     Other insomnia 07/07/2021     Priority: Medium     JANEE (generalized anxiety disorder) 04/19/2018     Priority: Medium     Gastroesophageal reflux disease with esophagitis 04/19/2018     Priority: Medium     Psychophysiological insomnia 04/19/2018     Priority: Medium     Adenomyosis 10/06/2017      Priority: Medium     Endometriosis of pelvis 08/23/2017     Priority: Medium     ACP (advance care planning) 06/21/2017     Priority: Medium     Advance Care Planning 6/21/2017: ACP Review of Chart / Resources Provided:  Reviewed chart for advance care plan.  Shweta Harris has been provided information and resources to begin or update their advance care plan.  Added by Abril Peterson             Heterozygous factor V Leiden mutation (H) 10/09/2014     Priority: Medium        Past Medical History:      Past Medical History:   Diagnosis Date     Dermatitis fungal 6/30/2011     Factor V Leiden 6/28/2012     Family history of other blood disorders 6/30/2011     Heterozygous factor V Leiden mutation (H) 10/9/2014     Heterozygous factor V Leiden mutation (H)      Palpitations 9/22/2006       Past Surgical History:      Past Surgical History:   Procedure Laterality Date     COLONOSCOPY N/A 12/1/2014    Procedure: COLONOSCOPY;  Surgeon: Blanche Trivedi MD;  [facility]     CYSTOSCOPY N/A 8/23/2017    Procedure: CYSTOSCOPY;;  Surgeon: Santhosh May MD;  [facility]     ESOPHAGOSCOPY, GASTROSCOPY, DUODENOSCOPY (EGD), COMBINED N/A 3/5/2018    Procedure: COMBINED ESOPHAGOSCOPY, GASTROSCOPY, DUODENOSCOPY (EGD);  UPPER ENDOSCOPY WITH BIOPSY;  Surgeon: Lorenzo Berry MD;  [facility]     GYN SURGERY       HYSTERECTOMY TOTAL ABDOMINAL  8/23/2017    Procedure: HYSTERECTOMY TOTAL ABDOMINAL;;  Surgeon: Santhosh May MD;  [facility]     LAPAROSCOPIC ASSISTED HYSTERECTOMY VAGINAL, BILATERAL SALPINGO-OOPHORECTOMY, COMBINED N/A 8/23/2017    Procedure: COMBINED LAPAROSCOPIC ASSISTED HYSTERECTOMY VAGINAL, SALPINGO-OOPHORECTOMY;  LAPAROSCOPIC ASSISTED VAGINAL HYSTERECTOMY, BILATERAL SALPINGO-OOPHORECTOMY ATTEMPTED, total abdominal hysterctomy with bilateral salpingo-oopherectomy, cystoscopy;  Surgeon: Santhosh May MD;  [facility]     tubal ligation  1991       Family History:      Family History  "  Problem Relation Age of Onset     Asthma Father      Other - See Comments Father         Factor V Leiden (Carrier)     Heart Surgery Father 79        2 stents put in his heart.     Alzheimer Disease Mother         Lewie Body Dementia     Breast Cancer Mother      Diabetes Paternal Grandmother      Diabetes Maternal Grandmother      Other - See Comments Other         Niece, Factor V Leiden     Other - See Comments Daughter         Factor V Leiden     Other - See Comments Other         Nephew, Factor V Leiden, no family hx     Other - See Comments Sister         Factor V Leiden       Social History:    Marital Status:   [4]  Social History     Tobacco Use     Smoking status: Never Smoker     Smokeless tobacco: Never Used     Tobacco comment: No passive exposure   Substance Use Topics     Alcohol use: Yes     Comment: Occasionally     Drug use: No        Review of Systems   All other systems reviewed and are negative.        Physical Exam   Vitals:  /78 (BP Location: Right arm, Patient Position: Chair, Cuff Size: Adult Large)   Pulse 76   Temp 97.4  F (36.3  C) (Tympanic)   Ht 1.727 m (5' 8\")   Wt 94.3 kg (208 lb)   LMP 07/12/2017   SpO2 99%   BMI 31.63 kg/m        Physical Exam  Constitutional:       Appearance: Normal appearance. She is obese.   Pulmonary:      Effort: Pulmonary effort is normal.   Abdominal:      General: Abdomen is flat. There is no distension.      Palpations: Abdomen is soft. There is no mass.      Tenderness: There is no abdominal tenderness.      Hernia: No hernia is present.   Genitourinary:     Comments: External genitalia is normal.  Normal-appearing urethral meatus.  Patient does have urethral descensus with a cough and she does leak.  Robert test is positive.  I can stop the leakage with just some mild mid urethral support.  She has a good Kegel contraction.  No vaginal masses.  No significant prolapse no pelvic masses.  External rectal area is normal normal rectal " tone and no rectal masses.  Neurological:      Mental Status: She is alert.         Impression: Stress urinary incontinence  Plan   I discussed treatment options for Shweta  for stress incontinence.  She can do some further physical therapy and pelvic floor strengthening.  She recently has had improvement with her cough management and that has also helped.  We also discussed surgical management with a mid urethral sling.  Success rate typically with that is 85% we discussed the surgical procedure for that.  She is interested in conservative treatment at this time so a referral to physical therapy was made and they will contact her.  If things do not improve  and she wants further intervention with surgery then she can get back to us.      No follow-ups on file.    Araceli Vela MD  [facility]              Again, thank you for allowing me to participate in the care of your patient.      Sincerely,    Araceli Vela MD

## 2021-11-09 NOTE — PROGRESS NOTES
History     Chief Complaint:      Consult (Mixed incontinence-Antionette dee is referring- happens when she coughs)      HPI   Shweta Harris is a 56 year old female who presents with a history of urinary incontinence.  Shweta said she has had leakage for many years.  Her leakage occurs primarily with a cough.  She denies any urgency or urge incontinence.  For a while she had a very bad cough and recently was started on some omeprazole and her cough is much better so her leakage is less.  She used to wear a large thick pad now she can get by with just a light day pad.  She has had a hysterectomy.  2 vaginal deliveries.  Urinary frequency is every 3-4 hours in the day nocturia 0-1.  No blood in the urine and no urinary tract infection history.    Allergies:    Allergies   Allergen Reactions     Seasonal Allergies         Medications:      Ascorbic Acid (VITAMIN C) 500 MG CHEW  aspirin (ASA) 81 MG chewable tablet  B Complex-Biotin-FA (B COMPLETE PO)  cholecalciferol (VITAMIN D3) 25 mcg (1000 units) capsule  diphenhydrAMINE HCl (GNP ALLERGY ANTIHISTAMINE PO)  fluticasone (FLONASE) 50 MCG/ACT nasal spray  magnesium 250 MG tablet  omeprazole (PRILOSEC) 40 MG DR capsule  Prenatal Vit-Fe Fumarate-FA (M-VIT PO)  Probiotic Product (PROBIOTIC DAILY) CAPS        Problem List:      Patient Active Problem List    Diagnosis Date Noted     Other insomnia 07/07/2021     Priority: Medium     JANEE (generalized anxiety disorder) 04/19/2018     Priority: Medium     Gastroesophageal reflux disease with esophagitis 04/19/2018     Priority: Medium     Psychophysiological insomnia 04/19/2018     Priority: Medium     Adenomyosis 10/06/2017     Priority: Medium     Endometriosis of pelvis 08/23/2017     Priority: Medium     ACP (advance care planning) 06/21/2017     Priority: Medium     Advance Care Planning 6/21/2017: ACP Review of Chart / Resources Provided:  Reviewed chart for advance care plan.  Shweta Harris has been provided  information and resources to begin or update their advance care plan.  Added by Abril Peterson             Heterozygous factor V Leiden mutation (H) 10/09/2014     Priority: Medium        Past Medical History:      Past Medical History:   Diagnosis Date     Dermatitis fungal 6/30/2011     Factor V Leiden 6/28/2012     Family history of other blood disorders 6/30/2011     Heterozygous factor V Leiden mutation (H) 10/9/2014     Heterozygous factor V Leiden mutation (H)      Palpitations 9/22/2006       Past Surgical History:      Past Surgical History:   Procedure Laterality Date     COLONOSCOPY N/A 12/1/2014    Procedure: COLONOSCOPY;  Surgeon: Blanche Trivedi MD;  [facility]     CYSTOSCOPY N/A 8/23/2017    Procedure: CYSTOSCOPY;;  Surgeon: Santhosh May MD;  [facility]     ESOPHAGOSCOPY, GASTROSCOPY, DUODENOSCOPY (EGD), COMBINED N/A 3/5/2018    Procedure: COMBINED ESOPHAGOSCOPY, GASTROSCOPY, DUODENOSCOPY (EGD);  UPPER ENDOSCOPY WITH BIOPSY;  Surgeon: Lorenzo Berry MD;  [facility]     GYN SURGERY       HYSTERECTOMY TOTAL ABDOMINAL  8/23/2017    Procedure: HYSTERECTOMY TOTAL ABDOMINAL;;  Surgeon: Santhosh May MD;  [facility]     LAPAROSCOPIC ASSISTED HYSTERECTOMY VAGINAL, BILATERAL SALPINGO-OOPHORECTOMY, COMBINED N/A 8/23/2017    Procedure: COMBINED LAPAROSCOPIC ASSISTED HYSTERECTOMY VAGINAL, SALPINGO-OOPHORECTOMY;  LAPAROSCOPIC ASSISTED VAGINAL HYSTERECTOMY, BILATERAL SALPINGO-OOPHORECTOMY ATTEMPTED, total abdominal hysterctomy with bilateral salpingo-oopherectomy, cystoscopy;  Surgeon: Santhosh May MD;  [facility]     tubal ligation  1991       Family History:      Family History   Problem Relation Age of Onset     Asthma Father      Other - See Comments Father         Factor V Leiden (Carrier)     Heart Surgery Father 79        2 stents put in his heart.     Alzheimer Disease Mother         Lewie Body Dementia     Breast Cancer Mother      Diabetes Paternal Grandmother       "Diabetes Maternal Grandmother      Other - See Comments Other         Niece, Factor V Leiden     Other - See Comments Daughter         Factor V Leiden     Other - See Comments Other         Nephew, Factor V Leiden, no family hx     Other - See Comments Sister         Factor V Leiden       Social History:    Marital Status:   [4]  Social History     Tobacco Use     Smoking status: Never Smoker     Smokeless tobacco: Never Used     Tobacco comment: No passive exposure   Substance Use Topics     Alcohol use: Yes     Comment: Occasionally     Drug use: No        Review of Systems   All other systems reviewed and are negative.        Physical Exam   Vitals:  /78 (BP Location: Right arm, Patient Position: Chair, Cuff Size: Adult Large)   Pulse 76   Temp 97.4  F (36.3  C) (Tympanic)   Ht 1.727 m (5' 8\")   Wt 94.3 kg (208 lb)   LMP 07/12/2017   SpO2 99%   BMI 31.63 kg/m        Physical Exam  Constitutional:       Appearance: Normal appearance. She is obese.   Pulmonary:      Effort: Pulmonary effort is normal.   Abdominal:      General: Abdomen is flat. There is no distension.      Palpations: Abdomen is soft. There is no mass.      Tenderness: There is no abdominal tenderness.      Hernia: No hernia is present.   Genitourinary:     Comments: External genitalia is normal.  Normal-appearing urethral meatus.  Patient does have urethral descensus with a cough and she does leak.  Robert test is positive.  I can stop the leakage with just some mild mid urethral support.  She has a good Kegel contraction.  No vaginal masses.  No significant prolapse no pelvic masses.  External rectal area is normal normal rectal tone and no rectal masses.  Neurological:      Mental Status: She is alert.         Impression: Stress urinary incontinence  Plan   I discussed treatment options for Shweta  for stress incontinence.  She can do some further physical therapy and pelvic floor strengthening.  She recently has had " improvement with her cough management and that has also helped.  We also discussed surgical management with a mid urethral sling.  Success rate typically with that is 85% we discussed the surgical procedure for that.  She is interested in conservative treatment at this time so a referral to physical therapy was made and they will contact her.  If things do not improve  and she wants further intervention with surgery then she can get back to us.      No follow-ups on file.    Araceli Vela MD  [facility]

## 2021-11-09 NOTE — NURSING NOTE
"Chief Complaint   Patient presents with     Consult     Mixed incontinence-Antionette luiz is referring- happens when she coughs       Initial /78 (BP Location: Right arm, Patient Position: Chair, Cuff Size: Adult Large)   Pulse 76   Temp 97.4  F (36.3  C) (Tympanic)   Ht 1.727 m (5' 8\")   Wt 94.3 kg (208 lb)   LMP 07/12/2017   SpO2 99%   BMI 31.63 kg/m   Estimated body mass index is 31.63 kg/m  as calculated from the following:    Height as of this encounter: 1.727 m (5' 8\").    Weight as of this encounter: 94.3 kg (208 lb).  Medication Reconciliation: complete  DREW MCLEAN LPN      Review of Systems:    Weight loss:    No     Recent fever/chills:  No   Night sweats:   No  Current skin rash:  No   Recent hair loss:  No  Heat intolerance:  No   Cold intolerance:  No  Chest pain:   No   Palpitations:   No  Shortness of breath:  No   Wheezing:   No  Constipation:    No   Diarrhea:   No   Nausea:   No   Vomiting:   No   Kidney/side pain:  No   Back pain:   No  Frequent headaches:  No   Dizziness:     No  Leg swelling:   No   Calf pain:    No    Parents, brothers or sisters with history of kidney cancer?   No  Parents, brothers or sisters with history of bladder cancer: No    "

## 2021-11-10 LAB — BACTERIA UR CULT: NORMAL

## 2021-11-29 NOTE — PROGRESS NOTES
Chief Complaint   Patient presents with     Throat Problem     Pt is here for a f/u cough, PND, NTH, and LPRD.       Patient presents for follow up. SHe was last seen on 20/26/21 for cough and noted to have related effects of coughing. Described a dry cough which lasted throughout the day and noted to occur w/o exertion. She had tried cough drops with some relief.   PFT was scheduled and was normal.   At her last visit, she was started on Flonase and recommended to stay on AH. She did have clear drainage throughout. She was further started on Prilosec daily and cautioned on reflux measures.     Today, she has been doing well. She has reported her cough has improved with medication use. She did forget her pills for about 2 days and have noticed cough continued.   Overall her cough has become less and less. She has no throat pain or globus sensation. She plans on working on lifestyle changes.       Water- 3 glasses.   Caffeine- No coffee.   ETOH- Rare  Tobacco- No. Never.   Reflux- Hx of reflux. She reduced coffee. Reports reflux intermittently.   Testing in 2018- BS, EGD completed which was normal.   Patient denies sore throat or throat pain.  No chronic throat clearing.   No aphonia.   Denies chronic otalgia.   Denies fevers or weight loss. Noted night sweats for years.   Denies dysphagia or dysphonia.    No chronic nasal congestion.   She has increase in post nasal drainage.      Past Medical History:   Diagnosis Date     Dermatitis fungal 6/30/2011     Factor V Leiden 6/28/2012     Family history of other blood disorders 6/30/2011     Heterozygous factor V Leiden mutation (H) 10/9/2014     Heterozygous factor V Leiden mutation (H)      Palpitations 9/22/2006        Allergies   Allergen Reactions     Seasonal Allergies      Current Outpatient Medications   Medication     Ascorbic Acid (VITAMIN C) 500 MG CHEW     aspirin (ASA) 81 MG chewable tablet     B Complex-Biotin-FA (B COMPLETE PO)     cholecalciferol (VITAMIN  "D3) 25 mcg (1000 units) capsule     diphenhydrAMINE HCl (GNP ALLERGY ANTIHISTAMINE PO)     fluticasone (FLONASE) 50 MCG/ACT nasal spray     magnesium 250 MG tablet     omeprazole (PRILOSEC) 40 MG DR capsule     Prenatal Vit-Fe Fumarate-FA (M-VIT PO)     Probiotic Product (PROBIOTIC DAILY) CAPS     No current facility-administered medications for this visit.      ROS: 10 point ROS neg other than the symptoms noted above in the HPI.  /76 (Cuff Size: Adult Regular)   Pulse 89   Temp 97.5  F (36.4  C) (Tympanic)   Ht 1.727 m (5' 8\")   Wt 94.3 kg (208 lb)   LMP 07/12/2017   SpO2 100%   BMI 31.63 kg/m      General - The patient is well nourished and well developed, and appears to have good nutritional status.  Alert and oriented to person and place, answers questions and cooperates with examination appropriately.   Head and Face - Normocephalic and atraumatic, with no gross asymmetry noted.  The facial nerve is intact, with strong symmetric movements.  Voice and Breathing - The patient was breathing comfortably without the use of accessory muscles. There was no wheezing, stridor, or stertor.  The patients voice was clear and strong, and had appropriate pitch and quality.  Ears -The external auditory canals are patent, the tympanic membranes are intact without effusion, retraction or mass.  Bony landmarks are intact.  Eyes - Extraocular movements intact, and the pupils were reactive to light.  Sclera were not icteric or injected, conjunctiva were pink and moist.  Mouth - Examination of the oral cavity showed pink, healthy oral mucosa. No lesions or ulcerations noted.  The tongue was mobile and midline, and the dentition were in good condition.    Throat - The walls of the oropharynx were smooth, pink, moist, symmetric, and had no lesions or ulcerations.  The tonsillar pillars and soft palate were symmetric.  The uvula was midline on elevation.    Neck - Normal midline excursion of the laryngotracheal complex " during swallowing.  Full range of motion on passive movement.  Palpation of the occipital, submental, submandibular, internal jugular chain, and supraclavicular nodes did not demonstrate any abnormal lymph nodes or masses.  Palpation of the thyroid was soft and smooth, with no nodules or goiter appreciated.  The trachea was mobile and midline.  Nose - External contour is symmetric, no gross deflection or scars.  Nasal mucosa is pink and moist with no abnormal mucus.       Flexible Endoscopy -     Attempts at mirror laryngoscopy were not possible due to gag reflex.  Therefore I proceeded with a fiberoptic examination.  First I sprayed both sides of the nose with a mixture of lidocaine and neosynephrine.  I then passed the scope through the nasal cavity.    The nasal cavity was unremarkable.  The nasopharynx was mucosally covered and symmetric. Clear drainage throughout. The Eustachian tube openings were unobstructed.  Going further down I had a clear view of the base of tongue, which had normal appearing lingual tonsillar tissue.  The base of tongue was free of lesions, and the vallecula was open.  The epiglottis was smooth and mucosally covered.  The supraglottic larynx was then clearly visualized.  The vocal cords moved smoothly and symmetrically, they were slightly edematous but pearly white and no lesions were seen.   The pyriform sinuses were open, and the limited view of the postcricoid region did not show any erosive or mass lesions. LPR changes improved.          ASSESSMENT/ PLAN:    ICD-10-CM    1. Cough  R05.9    2. LPRD (laryngopharyngeal reflux disease)  K21.9    3. Post-nasal drainage  R09.82    4. Nasal turbinate hypertrophy  J34.3          Continue with LPR precautions  Work on lifestyle changes  Continue with prilosec 40 mg daily. For 1-2 months.  Then decrease to 20 mg daily.   Consider daily Pepcid     Follow up PRN. May contact nurse for new order.     Adult lifestyle changes to prevent LPR  reviewed      Avoid eating and drinking within two to three hours prior to bedtime    Do not drink alcohol    Eat small meals and slowly    Limit problem foods:    o Caffeine  o Carbonated drinks  o Chocolate  o Peppermint  o Tomato  o Citrus fruits  o Fatty and fried foods      Lose weight    Quit smoking    Wear loose clothing      Adenike Cantrell PA-C  [facility], [place]

## 2021-11-30 ENCOUNTER — OFFICE VISIT (OUTPATIENT)
Dept: OTOLARYNGOLOGY | Facility: OTHER | Age: 56
End: 2021-11-30
Attending: PHYSICIAN ASSISTANT
Payer: COMMERCIAL

## 2021-11-30 VITALS
WEIGHT: 208 LBS | HEIGHT: 68 IN | DIASTOLIC BLOOD PRESSURE: 76 MMHG | HEART RATE: 89 BPM | TEMPERATURE: 97.5 F | OXYGEN SATURATION: 100 % | BODY MASS INDEX: 31.52 KG/M2 | SYSTOLIC BLOOD PRESSURE: 124 MMHG

## 2021-11-30 DIAGNOSIS — R09.82 POST-NASAL DRAINAGE: ICD-10-CM

## 2021-11-30 DIAGNOSIS — K21.9 LPRD (LARYNGOPHARYNGEAL REFLUX DISEASE): ICD-10-CM

## 2021-11-30 DIAGNOSIS — J34.3 NASAL TURBINATE HYPERTROPHY: ICD-10-CM

## 2021-11-30 DIAGNOSIS — R05.9 COUGH: Primary | ICD-10-CM

## 2021-11-30 PROCEDURE — 99213 OFFICE O/P EST LOW 20 MIN: CPT | Mod: 25 | Performed by: PHYSICIAN ASSISTANT

## 2021-11-30 PROCEDURE — 31575 DIAGNOSTIC LARYNGOSCOPY: CPT | Performed by: PHYSICIAN ASSISTANT

## 2021-11-30 ASSESSMENT — PAIN SCALES - GENERAL: PAINLEVEL: NO PAIN (0)

## 2021-11-30 ASSESSMENT — MIFFLIN-ST. JEOR: SCORE: 1571.98

## 2021-11-30 NOTE — NURSING NOTE
"Chief Complaint   Patient presents with     Throat Problem     Pt is here for a f/u cough, PND, NTH, and LPRD. The patient states that the Omeprazole did help.       Initial /76 (Cuff Size: Adult Regular)   Pulse 89   Temp 97.5  F (36.4  C) (Tympanic)   Ht 1.727 m (5' 8\")   Wt 94.3 kg (208 lb)   LMP 07/12/2017   SpO2 100%   BMI 31.63 kg/m   Estimated body mass index is 31.63 kg/m  as calculated from the following:    Height as of this encounter: 1.727 m (5' 8\").    Weight as of this encounter: 94.3 kg (208 lb).  Medication Reconciliation: complete  Siena Valdovinos LPN    "

## 2021-11-30 NOTE — LETTER
11/30/2021         RE: Shweta Harris  [address] [facility] [id number]        Dear Colleague,    Thank you for referring your patient, Shweta Harris, to the [facility]. Please see a copy of my visit note below.    Chief Complaint   Patient presents with     Throat Problem     Pt is here for a f/u cough, PND, NTH, and LPRD.       Patient presents for follow up. SHe was last seen on 20/26/21 for cough and noted to have related effects of coughing. Described a dry cough which lasted throughout the day and noted to occur w/o exertion. She had tried cough drops with some relief.   PFT was scheduled and was normal.   At her last visit, she was started on Flonase and recommended to stay on AH. She did have clear drainage throughout. She was further started on Prilosec daily and cautioned on reflux measures.     Today, she has been doing well. She has reported her cough has improved with medication use. She did forget her pills for about 2 days and have noticed cough continued.   Overall her cough has become less and less. She has no throat pain or globus sensation. She plans on working on lifestyle changes.       Water- 3 glasses.   Caffeine- No coffee.   ETOH- Rare  Tobacco- No. Never.   Reflux- Hx of reflux. She reduced coffee. Reports reflux intermittently.   Testing in 2018- BS, EGD completed which was normal.   Patient denies sore throat or throat pain.  No chronic throat clearing.   No aphonia.   Denies chronic otalgia.   Denies fevers or weight loss. Noted night sweats for years.   Denies dysphagia or dysphonia.    No chronic nasal congestion.   She has increase in post nasal drainage.      Past Medical History:   Diagnosis Date     Dermatitis fungal 6/30/2011     Factor V Leiden 6/28/2012     Family history of other blood disorders 6/30/2011     Heterozygous factor V Leiden mutation (H) 10/9/2014     Heterozygous factor V Leiden mutation (H)      Palpitations 9/22/2006       "  Allergies   Allergen Reactions     Seasonal Allergies      Current Outpatient Medications   Medication     Ascorbic Acid (VITAMIN C) 500 MG CHEW     aspirin (ASA) 81 MG chewable tablet     B Complex-Biotin-FA (B COMPLETE PO)     cholecalciferol (VITAMIN D3) 25 mcg (1000 units) capsule     diphenhydrAMINE HCl (GNP ALLERGY ANTIHISTAMINE PO)     fluticasone (FLONASE) 50 MCG/ACT nasal spray     magnesium 250 MG tablet     omeprazole (PRILOSEC) 40 MG DR capsule     Prenatal Vit-Fe Fumarate-FA (M-VIT PO)     Probiotic Product (PROBIOTIC DAILY) CAPS     No current facility-administered medications for this visit.      ROS: 10 point ROS neg other than the symptoms noted above in the HPI.  /76 (Cuff Size: Adult Regular)   Pulse 89   Temp 97.5  F (36.4  C) (Tympanic)   Ht 1.727 m (5' 8\")   Wt 94.3 kg (208 lb)   LMP 07/12/2017   SpO2 100%   BMI 31.63 kg/m      General - The patient is well nourished and well developed, and appears to have good nutritional status.  Alert and oriented to person and place, answers questions and cooperates with examination appropriately.   Head and Face - Normocephalic and atraumatic, with no gross asymmetry noted.  The facial nerve is intact, with strong symmetric movements.  Voice and Breathing - The patient was breathing comfortably without the use of accessory muscles. There was no wheezing, stridor, or stertor.  The patients voice was clear and strong, and had appropriate pitch and quality.  Ears -The external auditory canals are patent, the tympanic membranes are intact without effusion, retraction or mass.  Bony landmarks are intact.  Eyes - Extraocular movements intact, and the pupils were reactive to light.  Sclera were not icteric or injected, conjunctiva were pink and moist.  Mouth - Examination of the oral cavity showed pink, healthy oral mucosa. No lesions or ulcerations noted.  The tongue was mobile and midline, and the dentition were in good condition.    Throat - The " walls of the oropharynx were smooth, pink, moist, symmetric, and had no lesions or ulcerations.  The tonsillar pillars and soft palate were symmetric.  The uvula was midline on elevation.    Neck - Normal midline excursion of the laryngotracheal complex during swallowing.  Full range of motion on passive movement.  Palpation of the occipital, submental, submandibular, internal jugular chain, and supraclavicular nodes did not demonstrate any abnormal lymph nodes or masses.  Palpation of the thyroid was soft and smooth, with no nodules or goiter appreciated.  The trachea was mobile and midline.  Nose - External contour is symmetric, no gross deflection or scars.  Nasal mucosa is pink and moist with no abnormal mucus.       Flexible Endoscopy -     Attempts at mirror laryngoscopy were not possible due to gag reflex.  Therefore I proceeded with a fiberoptic examination.  First I sprayed both sides of the nose with a mixture of lidocaine and neosynephrine.  I then passed the scope through the nasal cavity.    The nasal cavity was unremarkable.  The nasopharynx was mucosally covered and symmetric. Clear drainage throughout. The Eustachian tube openings were unobstructed.  Going further down I had a clear view of the base of tongue, which had normal appearing lingual tonsillar tissue.  The base of tongue was free of lesions, and the vallecula was open.  The epiglottis was smooth and mucosally covered.  The supraglottic larynx was then clearly visualized.  The vocal cords moved smoothly and symmetrically, they were slightly edematous but pearly white and no lesions were seen.   The pyriform sinuses were open, and the limited view of the postcricoid region did not show any erosive or mass lesions. LPR changes improved.          ASSESSMENT/ PLAN:    ICD-10-CM    1. Cough  R05.9    2. LPRD (laryngopharyngeal reflux disease)  K21.9    3. Post-nasal drainage  R09.82    4. Nasal turbinate hypertrophy  J34.3          Continue with  LPR precautions  Work on lifestyle changes  Continue with prilosec 40 mg daily. For 1-2 months.  Then decrease to 20 mg daily.   Consider daily Pepcid     Follow up PRN. May contact nurse for new order.     Adult lifestyle changes to prevent LPR reviewed      Avoid eating and drinking within two to three hours prior to bedtime    Do not drink alcohol    Eat small meals and slowly    Limit problem foods:    o Caffeine  o Carbonated drinks  o Chocolate  o Peppermint  o Tomato  o Citrus fruits  o Fatty and fried foods      Lose weight    Quit smoking    Wear loose clothing      Adenike Cantrell PA-C  [facility], [place]          Again, thank you for allowing me to participate in the care of your patient.        Sincerely,        Adenike Cantrell PA-C

## 2021-11-30 NOTE — PATIENT INSTRUCTIONS
Continue with Prilosec 40 mg daily.   In 1-2 months decrease to 20 mg daily.   Work on lifestyle changes.     Adult lifestyle changes to prevent LPR reviewed      Avoid eating and drinking within two to three hours prior to bedtime    Do not drink alcohol    Eat small meals and slowly    Limit problem foods:    o Caffeine  o Carbonated drinks  o Chocolate  o Peppermint  o Tomato  o Citrus fruits  o Fatty and fried foods      Lose weight      Thank you for allowing Adenike Cantrell PA-C and our ENT team to participate in your care.  If your medications are too expensive, please give the nurse a call.  We can possibly change this medication.  If you have a scheduling or an appointment question please contact our Health Unit Coordinator at [phone], Ext. [phone].    ALL nursing questions or concerns can be directed to your ENT nurse at: [phone] [facility]            Patient Education     GERD (Adult)    The esophagus is a tube that carries food from the mouth to the stomach. A valve (the LES, lower esophageal sphincter) at the lower end of the esophagus prevents stomach acid from flowing upward. When this valve doesn't work properly, stomach contents may repeatedly flow back up (reflux) into the esophagus. This is called gastroesophageal reflux disease (GERD). GERD can irritate the esophagus. It can cause problems with pain, swallowing or breathing. In severe cases, GERD can cause recurrent pneumonia (from aspiration or breathing in particles) or other serious problems.  Symptoms of reflux include burning, pressure or sharp pain in the upper abdomen or mid to lower chest. The pain can spread to the neck, back, or shoulder. There may be belching, an acid taste in the back of the throat, chronic cough, or sore throat, or hoarseness. GERD symptoms often occur during the day after a big meal. They can also occur at night when lying down.   Home care  Lifestyle changes can help reduce symptoms. If needed, your healthcare provider  "may prescribe medicines. Symptoms often improve with treatment, but if treatment is stopped, the symptoms often return after a few months. So most persons with GERD will need to continue treatment or get treatment on and off.  Lifestyle changes    Limit or avoid fatty, fried, and spicy foods, as well as coffee, chocolate, mint, and foods with high acid content such as tomatoes and citrus fruit and juices (orange, grapefruit, lemon).    Don t eat large meals, especially at night. Frequent, smaller meals are best. Don't lie down right after eating. And don t eat anything 3 hours before going to bed.    Don't drink alcohol or smoke. As much as possible, stay away from second hand smoke.    If you are overweight, losing weight will reduce symptoms.     Don't wear tight clothing around your stomach area.    If your symptoms occur during sleep, use a foam wedge to elevate your upper body (not just your head.) Or, place 4\" blocks under the head of your bed. Or use 2 bed risers under your bedframe.  Medicines  If needed, medicines can help relieve the symptoms of GERD and prevent damage to the esophagus. Discuss a medicine plan with your healthcare provider. This may include one or more of the following medicines:    Antacids to help neutralize the normal acids in your stomach.    Acid blockers (Histamine or H2 blockers) to decrease acid production.    Acid inhibitors (proton pump inhibitors PPIs) to decrease acid production in a different way than the blockers. They may work better, but can take a little longer to take effect.  Take an antacid 30 to 60 minutes after eating and at bedtime, but not at the same time as an acid blocker.  Try not to take medicines such as ibuprofen and aspirin. If you are taking aspirin for your heart or other medical reasons, talk to your healthcare provider about stopping it.  Follow-up care  Follow up with your healthcare provider or as advised by our staff.  When to seek medical " advice  Call your healthcare provider if any of the following occur:    Stomach pain gets worse or moves to the lower right abdomen (appendix area)    Chest pain appears or gets worse, or spreads to the back, neck, shoulder, or arm    An over-the-counter trial of medicine doesn't relieve your symptoms    Weight loss that can't be explained    Trouble or pain swallowing    Frequent vomiting (can t keep down liquids)    Blood in the stool or vomit (red or black in color)    Feeling weak or dizzy    Fever of 100.4 F (38 C) or higher, or as directed by your healthcare provider  Tracy last reviewed this educational content on 3/1/2018    8627-0398 [employer]. All rights reserved. This information is not intended as a substitute for professional medical care. Always follow your healthcare professional's instructions.

## 2021-12-03 ENCOUNTER — TELEPHONE (OUTPATIENT)
Dept: OTOLARYNGOLOGY | Facility: OTHER | Age: 56
End: 2021-12-03
Payer: COMMERCIAL

## 2021-12-03 DIAGNOSIS — R05.9 COUGH: Primary | ICD-10-CM

## 2021-12-03 DIAGNOSIS — K21.9 LPRD (LARYNGOPHARYNGEAL REFLUX DISEASE): ICD-10-CM

## 2021-12-03 RX ORDER — PANTOPRAZOLE SODIUM 40 MG/1
40 TABLET, DELAYED RELEASE ORAL DAILY
Qty: 30 TABLET | Refills: 4 | Status: SHIPPED | OUTPATIENT
Start: 2021-12-03 | End: 2022-06-22

## 2021-12-03 NOTE — TELEPHONE ENCOUNTER
1:22 PM    Reason for Call: Phone Call    Description: Pt is having bad gas with the new medicine that was prescribed. Please call to discuss possible change of medication.    Was an appointment offered for this call? No  If yes : Appointment type              Date    Preferred method for responding to this message: Telephone Call  What is your phone number ? [phone]    If we cannot reach you directly, may we leave a detailed response at the number you provided? Yes    Can this message wait until your PCP/provider returns, if available today? Not applicable, provider is in today.    Ashley Cartwright

## 2022-01-06 ENCOUNTER — HOSPITAL ENCOUNTER (OUTPATIENT)
Dept: PHYSICAL THERAPY | Facility: HOSPITAL | Age: 57
Setting detail: THERAPIES SERIES
End: 2022-01-06
Attending: UROLOGY
Payer: COMMERCIAL

## 2022-01-06 DIAGNOSIS — N39.3 FEMALE STRESS INCONTINENCE: ICD-10-CM

## 2022-01-06 PROCEDURE — 97161 PT EVAL LOW COMPLEX 20 MIN: CPT | Mod: GP

## 2022-01-06 PROCEDURE — 97530 THERAPEUTIC ACTIVITIES: CPT | Mod: GP

## 2022-01-06 NOTE — PROGRESS NOTES
01/06/22 0900   General Information   Type of Visit Initial OP Ortho PT Evaluation   Start of Care Date 01/06/22   Referring Physician Dr Vela   Orders Evaluate and Treat   Orders Comment female stress incontinence   Date of Order 11/09/21   Certification Required? No   Medical Diagnosis stress incontinence    Surgical/Medical history reviewed Yes   Precautions/Limitations no known precautions/limitations   Body Part(s)   Body Part(s) Pelvic Floor Dysfunction   Presentation and Etiology   Pertinent history of current problem (include personal factors and/or comorbidities that impact the POC) This 55 y/o female referred to PT d/t mixed incontinence kirsten with coughing. No longer coughing as much d/t taking prilosec Denies pain. 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 Complete abdominal hysterectomy with ovaries removed.  Denies leakage with lifting    Impairments   (pain with intercourse )   Pain rating (0-10 point scale) Denies pain  (does have dyspaurenia )   Fall Risk Screen   Fall screen completed by PT   Have you fallen 2 or more times in the past year? No   Have you fallen and had an injury in the past year? Yes  (fell at work, hit head.)   Is patient a fall risk? No   Specific Questions   Specific Questions Pelvic Floor Dysfunction;Women's Health   Pelvic Floor Dysfunction Questions   Regular exercise Yes   Fluid intake-glasses/day (one glass/cup = 8oz 6-10   Caffeinated beverages-glasses/day 0   Alcoholic beverages - glasses/day 0   Recent diet change? Yes   How long can you delay the need to urinate?  3+hours   How many times do you wake to urinate at night?   0-1   How often do you urinate during the day?   3-5   Can you stop the flow of urine when on the toilet?  No   Is the volume of urine passed usually  Medium   Do you have the sensation that you need to go to the toilet?  Yes   Do you empty your bladder frequently, before  "you experience the urge to pass urine?  Yes   Do you have \"triggers\" that make you feel you can't wait to go to the toilet?  Yes   Number of bladder infections last year?  0   Frequency of bowel movements:  1   Consistency of stool?  Soft formed   Do you ignore the urge to defecate?  Yes   Women's Health Questions   Number of pregnancies  2   Number of vaginal deliveries  2   Weight of largest baby  8 '5\"   Number of episiotomies  2   Pelvic Floor Dysfunction Objective Findings   Pain-pelvic dysfunction Pelvic pain   Observation slightly kyphotic thoracic   Posture   (leg lengths equal)   Abdominal Wall Scar mobility   Type of Storage Problem continuous dribbling   Protection needed Pad;Number of pads per day   Power (MMT at Levator Ani) 2   Endurance (Up to 10 seconds as long as still 50% power) 3   Repetitions (Contract 10 seconds or MVC, rest 4 seconds and count max number of reps) 4   Fast Twitch (Number of 1 second contractions can do in 10 seconds. Norm=7 reps) 5   Elevation (Able to lift posterior vaginal wall toward head and pubic bone) Present   Medications   (see chart. Unable to take HRT.)   Pelvic PFM signif tightness t/o R>L bulbo,ischio cavernosus, levator   Pad changes depending on amount of leakage- when coughing wears higher absorbency   Number of pads 1   Scar mobility bikini line incision from hysterectomy 2017- fairly mobile- mid incision with slight immobility   Planned Therapy Interventions   Planned Therapy Interventions manual therapy;neuromuscular re-education;strengthening;stretching   Planned Modality Interventions   Planned Modality Interventions Biofeedback;Electrical stimulation   Clinical Impression   Criteria for Skilled Therapeutic Interventions Met yes, treatment indicated   PT Diagnosis UI, Dyspaurenia   Influenced by the following impairments pain, tightness, weakness, difficulty with urge   Functional limitations due to impairments sleeping, sex, work, travel   Clinical " Presentation Stable/Uncomplicated   Clinical Presentation Rationale clinical judgement   Clinical Decision Making (Complexity) Low complexity   Therapy Frequency 1 time/week   Predicted Duration of Therapy Intervention (days/wks) 12 wks   Risk & Benefits of therapy have been explained Yes   Patient, Family & other staff in agreement with plan of care Yes   Pelvic Health Informed Consent Statement Discussed with patient/guardian reason for referral regarding pelvic health needs and external/internal pelvic floor muscle examination.  Opportunity provided to ask questions and verbal consent for assessment and intervention was given.   Clinical Impression Comments Pt will benefit from guided HEP/education re- impairments and treatments to address impairments. Pt is cooperative and motivated to work towards goals.    Education Assessment   Barriers to Learning No barriers   ORTHO GOALS   PT Ortho Eval Goals 1;3;2   Ortho Goal 1   Goal Identifier STG 1   Goal Description Pt will demonstrate indep HEP compliance   Target Date 02/17/22   Ortho Goal 2   Goal Identifier STG 2   Goal Description Pt will have 50 % reduction of leakage episodes per week per her report through improved strength of PFM by 1 grade + through MMT/PERF   Target Date 03/03/22   Ortho Goal 3   Goal Identifier LTG    Goal Description ADLs and work will not be affected by UI   Target Date 03/31/22   Total Evaluation Time   PT Eval, Low Complexity Minutes (99073) 30

## 2022-01-11 ENCOUNTER — HOSPITAL ENCOUNTER (OUTPATIENT)
Dept: PHYSICAL THERAPY | Facility: HOSPITAL | Age: 57
Setting detail: THERAPIES SERIES
End: 2022-01-11
Attending: UROLOGY
Payer: COMMERCIAL

## 2022-01-11 PROCEDURE — 97530 THERAPEUTIC ACTIVITIES: CPT | Mod: GP

## 2022-01-11 PROCEDURE — 97110 THERAPEUTIC EXERCISES: CPT | Mod: GP

## 2022-01-11 PROCEDURE — 97140 MANUAL THERAPY 1/> REGIONS: CPT | Mod: GP

## 2022-01-20 ENCOUNTER — HOSPITAL ENCOUNTER (OUTPATIENT)
Dept: PHYSICAL THERAPY | Facility: HOSPITAL | Age: 57
Setting detail: THERAPIES SERIES
End: 2022-01-20
Attending: UROLOGY
Payer: COMMERCIAL

## 2022-01-20 PROCEDURE — 97140 MANUAL THERAPY 1/> REGIONS: CPT | Mod: GP

## 2022-01-20 PROCEDURE — 97530 THERAPEUTIC ACTIVITIES: CPT | Mod: GP

## 2022-01-20 PROCEDURE — 97110 THERAPEUTIC EXERCISES: CPT | Mod: GP

## 2022-01-28 ENCOUNTER — HOSPITAL ENCOUNTER (OUTPATIENT)
Dept: PHYSICAL THERAPY | Facility: HOSPITAL | Age: 57
Setting detail: THERAPIES SERIES
End: 2022-01-28
Attending: UROLOGY
Payer: COMMERCIAL

## 2022-01-28 PROCEDURE — 97530 THERAPEUTIC ACTIVITIES: CPT | Mod: GP

## 2022-01-28 PROCEDURE — 97110 THERAPEUTIC EXERCISES: CPT | Mod: GP

## 2022-01-28 PROCEDURE — 97140 MANUAL THERAPY 1/> REGIONS: CPT | Mod: GP

## 2022-02-01 ENCOUNTER — ANCILLARY PROCEDURE (OUTPATIENT)
Dept: MAMMOGRAPHY | Facility: OTHER | Age: 57
End: 2022-02-01
Attending: PHYSICIAN ASSISTANT
Payer: COMMERCIAL

## 2022-02-01 DIAGNOSIS — Z12.31 VISIT FOR SCREENING MAMMOGRAM: ICD-10-CM

## 2022-02-01 PROCEDURE — 77063 BREAST TOMOSYNTHESIS BI: CPT | Mod: TC | Performed by: RADIOLOGY

## 2022-02-01 PROCEDURE — 77067 SCR MAMMO BI INCL CAD: CPT | Mod: TC | Performed by: RADIOLOGY

## 2022-02-02 ENCOUNTER — TELEPHONE (OUTPATIENT)
Dept: MAMMOGRAPHY | Facility: OTHER | Age: 57
End: 2022-02-02
Payer: COMMERCIAL

## 2022-04-09 ENCOUNTER — HOSPITAL ENCOUNTER (EMERGENCY)
Facility: HOSPITAL | Age: 57
Discharge: HOME | End: 2022-04-09
Attending: NURSE PRACTITIONER | Admitting: NURSE PRACTITIONER
Payer: COMMERCIAL

## 2022-04-09 VITALS
TEMPERATURE: 99.3 F | SYSTOLIC BLOOD PRESSURE: 125 MMHG | OXYGEN SATURATION: 99 % | WEIGHT: 205 LBS | RESPIRATION RATE: 18 BRPM | BODY MASS INDEX: 31.17 KG/M2 | HEART RATE: 87 BPM | DIASTOLIC BLOOD PRESSURE: 80 MMHG

## 2022-04-09 DIAGNOSIS — J06.9 VIRAL URI WITH COUGH: Primary | ICD-10-CM

## 2022-04-09 DIAGNOSIS — N30.00 ACUTE CYSTITIS WITHOUT HEMATURIA: ICD-10-CM

## 2022-04-09 LAB
ALBUMIN UR-MCNC: 10 MG/DL
APPEARANCE UR: CLEAR
BACTERIA #/AREA URNS HPF: ABNORMAL /HPF
BILIRUB UR QL STRIP: NEGATIVE
COLOR UR AUTO: YELLOW
GLUCOSE UR STRIP-MCNC: NEGATIVE MG/DL
HGB UR QL STRIP: NEGATIVE
HYALINE CASTS: 3 /LPF
KETONES UR STRIP-MCNC: NEGATIVE MG/DL
LEUKOCYTE ESTERASE UR QL STRIP: ABNORMAL
MUCOUS THREADS #/AREA URNS LPF: PRESENT /LPF
NITRATE UR QL: NEGATIVE
PH UR STRIP: 7 [PH] (ref 4.7–8)
RBC URINE: 2 /HPF
SP GR UR STRIP: 1.02 (ref 1–1.03)
SQUAMOUS EPITHELIAL: <1 /HPF
UROBILINOGEN UR STRIP-MCNC: 4 MG/DL
WBC CLUMPS #/AREA URNS HPF: PRESENT /HPF
WBC URINE: 20 /HPF

## 2022-04-09 PROCEDURE — 81001 URINALYSIS AUTO W/SCOPE: CPT | Performed by: NURSE PRACTITIONER

## 2022-04-09 PROCEDURE — 99213 OFFICE O/P EST LOW 20 MIN: CPT | Performed by: NURSE PRACTITIONER

## 2022-04-09 PROCEDURE — G0463 HOSPITAL OUTPT CLINIC VISIT: HCPCS

## 2022-04-09 RX ORDER — SULFAMETHOXAZOLE/TRIMETHOPRIM 800-160 MG
1 TABLET ORAL 2 TIMES DAILY
Qty: 6 TABLET | Refills: 0 | Status: SHIPPED | OUTPATIENT
Start: 2022-04-09 | End: 2022-04-12

## 2022-04-09 ASSESSMENT — ENCOUNTER SYMPTOMS
NAUSEA: 0
HEMATURIA: 0
ABDOMINAL PAIN: 0
PALPITATIONS: 0
DIARRHEA: 0
FEVER: 1
FATIGUE: 1
CHILLS: 1
DIFFICULTY URINATING: 0
FREQUENCY: 1
SHORTNESS OF BREATH: 0
RHINORRHEA: 1
DYSURIA: 1
VOMITING: 0
FLANK PAIN: 1
COUGH: 1
HEADACHES: 0
SORE THROAT: 1

## 2022-04-09 NOTE — DISCHARGE INSTRUCTIONS
(J06.9) Viral URI with cough  (primary encounter diagnosis)  Comment: acute, symptomatic  Plan: No concerning HPI or exam findings. Lung sounds clear, SAO2 is normal at 99%, no respiratory distress.   Viral illness- symptomatic treatments   -- Symptomatic treatments recommended.  -Discussed that antibiotics would not help symptoms of viral URI. Education provided on symptoms of secondary bacterial infection such as new fever, chills, rigors, shortness of breath, increased work of breathing, that can occur with viral URI and need for further evaluation, if they occur.   - Ensure you are staying hydrated by drinking plenty of fluids or eating foods such as popsicles, jello, pudding.  - Honey can be soothing for sore throat  - Warm salt water gurgles can help soothe sore throat  - Rest  - Humidifier can help with congestion and help keep mucus membranes such as throat and nose from drying out.  - Sleeping slightly propped up can help with congestion and postnasal drainage that can worsen cough at bedtime.  - As long as you have never been told to take Tylenol and/or Ibuprofen you can use them to manage fever and body aches per package instructions  Make sure you eat when you take ibuprofen to avoid stomach upset.  - OTC cough medications per package instructions to help with cough. Check to see if the cough/cold medication already has acetaminophen (Tylenol) in it. If it does avoid taking additional Tylenol.  - If sudden onset of new fever, worsening symptoms return for further evaluation.  - OTC nasal steroid such as Flonase can help decrease sinus inflammation to help with congestion.  - Education provided on symptoms of post-viral bacterial infections including ear infection and pneumonia. This would require re-evaluation for treatment.     (N30.00) Acute cystitis without hematuria  Comment: acute, symptomatic  Plan: Given some bilateral flank versus body aches will treat with bactrim  START Bactrim 1 tablet twice  daily (AM/PM) for 3 days  - Take entire course of antibiotic even if you start to feel better.  - Antibiotics can cause stomach upset including nausea and diarrhea. Read your bottle or ask the pharmacist if antibiotic can be taken with food to help prevent nausea. If you have symptoms of diarrhea you can take an over-the-counter probiotic and/or increase foods with probiotics such as yogurt, Jarrell, sauerkraut.    - Ensure you are drinking plenty of fluids, emptying your bladder when you feel the urge versus holding urine, after urinating you can bear-down to empty bladder completely, after peeing wipe front to back to avoid introducing bacteria towards vaginal area.   - OTC azo (pyridium) can be helpful for burning discomfort. May discolor urine to orange.        RETURN TO THE ED WITH NEW OR WORSENING SYMPTOMS.    FOLLOW-UP WITH YOUR PRIMARY CARE PROVIDER IN 7-10 DAYS.      Gisele Nelson CNP      Results for orders placed or performed during the hospital encounter of 04/09/22   UA reflex to Microscopic     Status: Abnormal   Result Value Ref Range    Color Urine Yellow Colorless, Straw, Light Yellow, Yellow    Appearance Urine Clear Clear    Glucose Urine Negative Negative mg/dL    Bilirubin Urine Negative Negative    Ketones Urine Negative Negative mg/dL    Specific Gravity Urine 1.025 1.003 - 1.035    Blood Urine Negative Negative    pH Urine 7.0 4.7 - 8.0    Protein Albumin Urine 10  (A) Negative mg/dL    Urobilinogen Urine 4.0 (A) Normal, 2.0 mg/dL    Nitrite Urine Negative Negative    Leukocyte Esterase Urine Moderate (A) Negative    Bacteria Urine Few (A) None Seen /HPF    RBC Urine 2 <=2 /HPF    WBC Urine 20 (H) <=5 /HPF    Squamous Epithelials Urine <1 <=1 /HPF    WBC Clumps Urine Present (A) None Seen /HPF    Mucus Urine Present (A) None Seen /LPF    Hyaline Casts Urine 3 (H) <=2 /LPF

## 2022-04-09 NOTE — ED TRIAGE NOTES
Patient presents with complaints of a URI and states figured while she's here she wants to get checked for a UTI as well.

## 2022-04-09 NOTE — ED PROVIDER NOTES
History     Chief Complaint   Patient presents with     URI     Rule out Urinary Tract Infection     HPI  Shweta Harris is a 56 year old female who presents ambulatory for evaluation of upper respiratory symptoms. Started on Monday with dry, raspy throat. No has a dry cough. Low-grade fever of 99 for the last 4-5 days. She has been taking OTC acetaminophen, ibuprofen, dayquil/nyquil - might be helpful. No known exposures. Nonsmoker. Negative home COVID test on Thursday.       She has been having urinary frequency, dysuria ongoing for the last 2 days. Intermittently for the last month she has had these symptoms and has been trying to manage at home by increasing fluid intake.             Allergies:  Allergies   Allergen Reactions     Seasonal Allergies        Problem List:    Patient Active Problem List    Diagnosis Date Noted     Other insomnia 07/07/2021     Priority: Medium     JANEE (generalized anxiety disorder) 04/19/2018     Priority: Medium     Gastroesophageal reflux disease with esophagitis 04/19/2018     Priority: Medium     Psychophysiological insomnia 04/19/2018     Priority: Medium     Adenomyosis 10/06/2017     Priority: Medium     Endometriosis of pelvis 08/23/2017     Priority: Medium     ACP (advance care planning) 06/21/2017     Priority: Medium     Advance Care Planning 6/21/2017: ACP Review of Chart / Resources Provided:  Reviewed chart for advance care plan.  Shweta Harris has been provided information and resources to begin or update their advance care plan.  Added by Abril Peterson             Heterozygous factor V Leiden mutation (H) 10/09/2014     Priority: Medium        Past Medical History:    Past Medical History:   Diagnosis Date     Dermatitis fungal 6/30/2011     Factor V Leiden 6/28/2012     Family history of other blood disorders 6/30/2011     Heterozygous factor V Leiden mutation (H) 10/9/2014     Heterozygous factor V Leiden mutation (H)      Palpitations 9/22/2006        Past Surgical History:    Past Surgical History:   Procedure Laterality Date     COLONOSCOPY N/A 12/1/2014    Procedure: COLONOSCOPY;  Surgeon: Blanche Trivedi MD;  Location: [facility]     CYSTOSCOPY N/A 8/23/2017    Procedure: CYSTOSCOPY;;  Surgeon: Santhosh May MD;  Location: [facility]     ESOPHAGOSCOPY, GASTROSCOPY, DUODENOSCOPY (EGD), COMBINED N/A 3/5/2018    Procedure: COMBINED ESOPHAGOSCOPY, GASTROSCOPY, DUODENOSCOPY (EGD);  UPPER ENDOSCOPY WITH BIOPSY;  Surgeon: Lorenzo Berry MD;  Location: [facility]     GYN SURGERY       HYSTERECTOMY TOTAL ABDOMINAL  8/23/2017    Procedure: HYSTERECTOMY TOTAL ABDOMINAL;;  Surgeon: Santhosh May MD;  Location: [facility]     LAPAROSCOPIC ASSISTED HYSTERECTOMY VAGINAL, BILATERAL SALPINGO-OOPHORECTOMY, COMBINED N/A 8/23/2017    Procedure: COMBINED LAPAROSCOPIC ASSISTED HYSTERECTOMY VAGINAL, SALPINGO-OOPHORECTOMY;  LAPAROSCOPIC ASSISTED VAGINAL HYSTERECTOMY, BILATERAL SALPINGO-OOPHORECTOMY ATTEMPTED, total abdominal hysterctomy with bilateral salpingo-oopherectomy, cystoscopy;  Surgeon: Santhosh May MD;  Location: [facility]     tubal ligation  1991       Family History:    Family History   Problem Relation Age of Onset     Asthma Father      Other - See Comments Father         Factor V Leiden ([place])     Heart Surgery Father 79        2 stents put in his heart.     Alzheimer Disease Mother         Lewie Body Dementia     Breast Cancer Mother      Diabetes Paternal Grandmother      Diabetes Maternal Grandmother      Other - See Comments Other         Niece, Factor V Leiden     Other - See Comments Daughter         Factor V Leiden     Other - See Comments Other         Nephew, Factor V Leiden, no family hx     Other - See Comments Sister         Factor V Leiden       Social History:  Marital Status:   [4]  Social History     Tobacco Use     Smoking status: Never Smoker     Smokeless tobacco: Never Used     Tobacco comment: No passive exposure   Substance Use Topics      "Alcohol use: Yes     Comment: Occasionally     Drug use: No        Medications:    Ascorbic Acid (VITAMIN C) 500 MG CHEW  aspirin (ASA) 81 MG chewable tablet  B Complex-Biotin-FA (B COMPLETE PO)  cholecalciferol (VITAMIN D3) 25 mcg (1000 units) capsule  diphenhydrAMINE HCl (GNP ALLERGY ANTIHISTAMINE PO)  fluticasone (FLONASE) 50 MCG/ACT nasal spray  magnesium 250 MG tablet  omeprazole (PRILOSEC) 40 MG DR capsule  pantoprazole (PROTONIX) 40 MG EC tablet  Prenatal Vit-Fe Fumarate-FA (M-VIT PO)  Probiotic Product (PROBIOTIC DAILY) CAPS  sulfamethoxazole-trimethoprim (BACTRIM DS) 800-160 MG tablet          Review of Systems   Constitutional: Positive for chills (\"the other day\"), fatigue (\"when I wake up I am tired, then i am fine\") and fever (\"low grade, 99\").   HENT: Positive for congestion, ear pain, rhinorrhea and sore throat (\"dry, raspy\"). Negative for ear discharge.    Respiratory: Positive for cough. Negative for shortness of breath.    Cardiovascular: Negative for chest pain and palpitations.   Gastrointestinal: Negative for abdominal pain, diarrhea, nausea and vomiting.   Genitourinary: Positive for dysuria, flank pain (bilateral -uncertain if from coughing or generalized body aches) and frequency. Negative for difficulty urinating, hematuria and urgency.   Musculoskeletal:        + generalized body aches   Skin: Negative for rash.   Neurological: Negative for headaches.       Physical Exam   BP: 125/80  Pulse: 87  Temp: 99.3  F (37.4  C)  Resp: 18  Weight: 93 kg (205 lb)  SpO2: 99 %      Physical Exam  Constitutional:       General: She is not in acute distress.     Appearance: Normal appearance. She is not ill-appearing or toxic-appearing.   HENT:      Head: Normocephalic.      Right Ear: Tympanic membrane, ear canal and external ear normal.      Left Ear: Tympanic membrane, ear canal and external ear normal.      Nose: Congestion and rhinorrhea (frequent sniffling) present.      Mouth/Throat:      Lips: " Pink.      Mouth: Mucous membranes are moist.      Pharynx: Oropharynx is clear. Uvula midline.   Eyes:      General: Lids are normal.      Conjunctiva/sclera: Conjunctivae normal.   Cardiovascular:      Rate and Rhythm: Normal rate and regular rhythm.      Heart sounds: S1 normal and S2 normal. No murmur heard.    No friction rub. No gallop.   Pulmonary:      Effort: Pulmonary effort is normal. No tachypnea or respiratory distress.      Breath sounds: Normal breath sounds. No wheezing, rhonchi or rales.      Comments: Intermittent dry cough  Abdominal:      General: There is no distension.      Palpations: Abdomen is soft.      Tenderness: There is no abdominal tenderness. There is no right CVA tenderness, left CVA tenderness, guarding or rebound.   Musculoskeletal:      Cervical back: Neck supple.      Comments: FROM of upper and lower extremities   Lymphadenopathy:      Cervical: No cervical adenopathy.   Skin:     General: Skin is warm and dry.      Capillary Refill: Capillary refill takes less than 2 seconds.      Coloration: Skin is not pale.   Neurological:      Mental Status: She is alert and oriented to person, place, and time.      Gait: Gait is intact.   Psychiatric:         Speech: Speech normal.         Behavior: Behavior is cooperative.         ED Course                 Procedures         Results for orders placed or performed during the hospital encounter of 04/09/22 (from the past 24 hour(s))   UA reflex to Microscopic   Result Value Ref Range    Color Urine Yellow Colorless, Straw, Light Yellow, Yellow    Appearance Urine Clear Clear    Glucose Urine Negative Negative mg/dL    Bilirubin Urine Negative Negative    Ketones Urine Negative Negative mg/dL    Specific Gravity Urine 1.025 1.003 - 1.035    Blood Urine Negative Negative    pH Urine 7.0 4.7 - 8.0    Protein Albumin Urine 10  (A) Negative mg/dL    Urobilinogen Urine 4.0 (A) Normal, 2.0 mg/dL    Nitrite Urine Negative Negative    Leukocyte  Esterase Urine Moderate (A) Negative    Bacteria Urine Few (A) None Seen /HPF    RBC Urine 2 <=2 /HPF    WBC Urine 20 (H) <=5 /HPF    Squamous Epithelials Urine <1 <=1 /HPF    WBC Clumps Urine Present (A) None Seen /HPF    Mucus Urine Present (A) None Seen /LPF    Hyaline Casts Urine 3 (H) <=2 /LPF       Medications - No data to display    Assessments & Plan (with Medical Decision Making)     I have reviewed the nursing notes.    I have reviewed the findings, diagnosis, plan and need for follow up with the patient.  (J06.9) Viral URI with cough  (primary encounter diagnosis)  Comment: acute, symptomatic  Plan: No concerning HPI or exam findings. Lung sounds clear, SAO2 is normal at 99%, no respiratory distress.   Viral illness- symptomatic treatments      (N30.00) Acute cystitis without hematuria  Comment: acute, symptomatic  Plan: Given some bilateral flank versus body aches will treat with bactrim  START Bactrim 1 tablet twice daily (AM/PM) for 3 days  - Ensure you are drinking plenty of fluids, emptying your bladder when you feel the urge versus holding urine, after urinating you can bear-down to empty bladder completely, after peeing wipe front to back to avoid introducing bacteria towards vaginal area.   - OTC azo (pyridium) can be helpful for burning discomfort. May discolor urine to orange.        Gisele Nelson CNP    New Prescriptions    SULFAMETHOXAZOLE-TRIMETHOPRIM (BACTRIM DS) 800-160 MG TABLET    Take 1 tablet by mouth 2 times daily for 3 days       Final diagnoses:   Viral URI with cough   Acute cystitis without hematuria       4/9/2022   [facility]     Gisele Nelson CNP  04/09/22 [id number]

## 2022-04-09 NOTE — ED TRIAGE NOTES
uti symptoms off and on for about a month last 2 days worse with urinary frequency slight flank pain, denies vaginal discharge and odor.  Denies any blood in urine.  Uri symptoms raspy throat over a month ago. Cough times 2 days non productive has sinus drainage.  Low grad fever times 4-5 days not over a 100

## 2022-06-22 DIAGNOSIS — K21.9 LPRD (LARYNGOPHARYNGEAL REFLUX DISEASE): ICD-10-CM

## 2022-06-22 DIAGNOSIS — R05.9 COUGH: ICD-10-CM

## 2022-06-22 RX ORDER — PANTOPRAZOLE SODIUM 40 MG/1
TABLET, DELAYED RELEASE ORAL
Qty: 90 TABLET | Refills: 1 | Status: SHIPPED | OUTPATIENT
Start: 2022-06-22 | End: 2023-03-31

## 2022-08-15 ENCOUNTER — HOSPITAL ENCOUNTER (EMERGENCY)
Facility: HOSPITAL | Age: 57
Discharge: HOME | End: 2022-08-15
Attending: PHYSICIAN ASSISTANT | Admitting: PHYSICIAN ASSISTANT
Payer: COMMERCIAL

## 2022-08-15 VITALS
HEART RATE: 81 BPM | DIASTOLIC BLOOD PRESSURE: 90 MMHG | OXYGEN SATURATION: 99 % | TEMPERATURE: 97.9 F | SYSTOLIC BLOOD PRESSURE: 124 MMHG | RESPIRATION RATE: 16 BRPM

## 2022-08-15 DIAGNOSIS — N39.0 URINARY TRACT INFECTION: ICD-10-CM

## 2022-08-15 DIAGNOSIS — N39.0 URINARY TRACT INFECTION WITHOUT HEMATURIA, SITE UNSPECIFIED: ICD-10-CM

## 2022-08-15 LAB
ALBUMIN UR-MCNC: NEGATIVE MG/DL
APPEARANCE UR: ABNORMAL
BACTERIA #/AREA URNS HPF: ABNORMAL /HPF
BILIRUB UR QL STRIP: NEGATIVE
COLOR UR AUTO: ABNORMAL
GLUCOSE UR STRIP-MCNC: NEGATIVE MG/DL
HGB UR QL STRIP: NEGATIVE
KETONES UR STRIP-MCNC: NEGATIVE MG/DL
LEUKOCYTE ESTERASE UR QL STRIP: ABNORMAL
MUCOUS THREADS #/AREA URNS LPF: PRESENT /LPF
NITRATE UR QL: NEGATIVE
PH UR STRIP: 5.5 [PH] (ref 4.7–8)
RBC URINE: 8 /HPF
SP GR UR STRIP: 1.02 (ref 1–1.03)
SQUAMOUS EPITHELIAL: 2 /HPF
UROBILINOGEN UR STRIP-MCNC: NORMAL MG/DL
WBC URINE: 134 /HPF

## 2022-08-15 PROCEDURE — 81001 URINALYSIS AUTO W/SCOPE: CPT | Performed by: PHYSICIAN ASSISTANT

## 2022-08-15 PROCEDURE — G0463 HOSPITAL OUTPT CLINIC VISIT: HCPCS

## 2022-08-15 PROCEDURE — 87086 URINE CULTURE/COLONY COUNT: CPT | Performed by: PHYSICIAN ASSISTANT

## 2022-08-15 PROCEDURE — 99213 OFFICE O/P EST LOW 20 MIN: CPT | Performed by: PHYSICIAN ASSISTANT

## 2022-08-15 RX ORDER — CEPHALEXIN 500 MG/1
500 CAPSULE ORAL 3 TIMES DAILY
Qty: 21 CAPSULE | Refills: 0 | Status: SHIPPED | OUTPATIENT
Start: 2022-08-15 | End: 2022-08-22

## 2022-08-15 ASSESSMENT — ACTIVITIES OF DAILY LIVING (ADL): ADLS_ACUITY_SCORE: 35

## 2022-08-15 NOTE — ED TRIAGE NOTES
Ongoing for a month painful urination, feels like there is a pressure, frequent trips. Therapies tried, pushing fluids, has not helped.

## 2022-08-15 NOTE — ED TRIAGE NOTES
Pt states hip has been painful on left side. Ongoing for years, lately it's been more constant. No known injury.

## 2022-08-15 NOTE — DISCHARGE INSTRUCTIONS
Start Keflex 3 times a day as prescribed.    Follow-up in the clinic in the next few weeks for recheck.    Return to this emergency department for any new or worsening symptoms.

## 2022-08-16 NOTE — ED PROVIDER NOTES
History     Chief Complaint   Patient presents with     Dysuria     The history is provided by the patient.     Shweta Harris is a 56 year old female who presented to the urgent care for evaluation of approximate 3 to 4-week history of intermittent dysuria.  No flank pain.  No fevers.  No history immunosuppression.    Allergies:  Allergies   Allergen Reactions     Seasonal Allergies        Problem List:    Patient Active Problem List    Diagnosis Date Noted     Other insomnia 07/07/2021     Priority: Medium     JANEE (generalized anxiety disorder) 04/19/2018     Priority: Medium     Gastroesophageal reflux disease with esophagitis 04/19/2018     Priority: Medium     Psychophysiological insomnia 04/19/2018     Priority: Medium     Adenomyosis 10/06/2017     Priority: Medium     Endometriosis of pelvis 08/23/2017     Priority: Medium     ACP (advance care planning) 06/21/2017     Priority: Medium     Advance Care Planning 6/21/2017: ACP Review of Chart / Resources Provided:  Reviewed chart for advance care plan.  Shweta Harris has been provided information and resources to begin or update their advance care plan.  Added by Abril Peterson             Heterozygous factor V Leiden mutation (H) 10/09/2014     Priority: Medium        Past Medical History:    Past Medical History:   Diagnosis Date     Dermatitis fungal 6/30/2011     Factor V Leiden 6/28/2012     Family history of other blood disorders 6/30/2011     Heterozygous factor V Leiden mutation (H) 10/9/2014     Heterozygous factor V Leiden mutation (H)      Palpitations 9/22/2006       Past Surgical History:    Past Surgical History:   Procedure Laterality Date     COLONOSCOPY N/A 12/1/2014    Procedure: COLONOSCOPY;  Surgeon: Blanche Trivedi MD;  [facility]     CYSTOSCOPY N/A 8/23/2017    Procedure: CYSTOSCOPY;;  Surgeon: Santhosh May MD;  [facility]     ESOPHAGOSCOPY, GASTROSCOPY, DUODENOSCOPY (EGD), COMBINED N/A 3/5/2018    Procedure:  COMBINED ESOPHAGOSCOPY, GASTROSCOPY, DUODENOSCOPY (EGD);  UPPER ENDOSCOPY WITH BIOPSY;  Surgeon: Lorenzo Berry MD;  [facility]     GYN SURGERY       HYSTERECTOMY TOTAL ABDOMINAL  8/23/2017    Procedure: HYSTERECTOMY TOTAL ABDOMINAL;;  Surgeon: Santhosh May MD;  [facility]     LAPAROSCOPIC ASSISTED HYSTERECTOMY VAGINAL, BILATERAL SALPINGO-OOPHORECTOMY, COMBINED N/A 8/23/2017    Procedure: COMBINED LAPAROSCOPIC ASSISTED HYSTERECTOMY VAGINAL, SALPINGO-OOPHORECTOMY;  LAPAROSCOPIC ASSISTED VAGINAL HYSTERECTOMY, BILATERAL SALPINGO-OOPHORECTOMY ATTEMPTED, total abdominal hysterctomy with bilateral salpingo-oopherectomy, cystoscopy;  Surgeon: Santhosh May MD;  [facility]: [facility]     tubal ligation  1991       Family History:    Family History   Problem Relation Age of Onset     Asthma Father      Other - See Comments Father         Factor V Leiden (Carrier)     Heart Surgery Father 79        2 stents put in his heart.     Alzheimer Disease Mother         Lewie Body Dementia     Breast Cancer Mother      Diabetes Paternal Grandmother      Diabetes Maternal Grandmother      Other - See Comments Other         Niece, Factor V Leiden     Other - See Comments Daughter         Factor V Leiden     Other - See Comments Other         Nephew, Factor V Leiden, no family hx     Other - See Comments Sister         Factor V Leiden       Social History:  Marital Status:   [4]  Social History     Tobacco Use     Smoking status: Never Smoker     Smokeless tobacco: Never Used     Tobacco comment: No passive exposure   Substance Use Topics     Alcohol use: Yes     Comment: Occasionally     Drug use: No        Medications:    Ascorbic Acid (VITAMIN C) 500 MG CHEW  aspirin (ASA) 81 MG chewable tablet  B Complex-Biotin-FA (B COMPLETE PO)  cephALEXin (KEFLEX) 500 MG capsule  cholecalciferol (VITAMIN D3) 25 mcg (1000 units) capsule  diphenhydrAMINE HCl (GNP ALLERGY ANTIHISTAMINE PO)  fluticasone (FLONASE) 50 MCG/ACT nasal  spray  magnesium 250 MG tablet  omeprazole (PRILOSEC) 40 MG DR capsule  pantoprazole (PROTONIX) 40 MG EC tablet  Prenatal Vit-Fe Fumarate-FA (M-VIT PO)  Probiotic Product (PROBIOTIC DAILY) CAPS          Review of Systems   Genitourinary:        See HPI   Allergic/Immunologic: Negative for immunocompromised state.       Physical Exam   BP: 124/90  Pulse: 81  Temp: 97.9  F (36.6  C)  Resp: 16  SpO2: 99 %      Physical Exam  Vitals and nursing note reviewed.   Constitutional:       General: She is not in acute distress.     Appearance: Normal appearance. She is not ill-appearing, toxic-appearing or diaphoretic.   Cardiovascular:      Rate and Rhythm: Normal rate and regular rhythm.   Abdominal:      General: There is no distension.      Palpations: Abdomen is soft.      Tenderness: There is no abdominal tenderness. There is no guarding.   Skin:     General: Skin is warm and dry.      Capillary Refill: Capillary refill takes less than 2 seconds.   Neurological:      General: No focal deficit present.      Mental Status: She is alert and oriented to person, place, and time.         ED Course                 Procedures              Critical Care time:  none               Results for orders placed or performed during the hospital encounter of 08/15/22 (from the past 24 hour(s))   UA with Microscopic reflex to Culture    Specimen: Urine, NOS   Result Value Ref Range    Color Urine Light Yellow Colorless, Straw, Light Yellow, Yellow    Appearance Urine Slightly Cloudy (A) Clear    Glucose Urine Negative Negative mg/dL    Bilirubin Urine Negative Negative    Ketones Urine Negative Negative mg/dL    Specific Gravity Urine 1.025 1.003 - 1.035    Blood Urine Negative Negative    pH Urine 5.5 4.7 - 8.0    Protein Albumin Urine Negative Negative mg/dL    Urobilinogen Urine Normal Normal, 2.0 mg/dL    Nitrite Urine Negative Negative    Leukocyte Esterase Urine Large (A) Negative    Bacteria Urine Few (A) None Seen /HPF    Mucus  Urine Present (A) None Seen /LPF    RBC Urine 8 (H) <=2 /HPF    WBC Urine 134 (H) <=5 /HPF    Squamous Epithelials Urine 2 (H) <=1 /HPF    Narrative    Urine Culture ordered based on laboratory criteria       Medications - No data to display    Assessments & Plan (with Medical Decision Making)   Findings as above.  Urine culture was added.  Keflex 3 times a day for the next week.  Return here for any fevers or worsening symptoms.  Follow-up in the clinic with persistent symptoms.    This document was prepared using a combination of typing and voice generated software.  While every attempt was made for accuracy, spelling and grammatical errors may exist.    I have reviewed the nursing notes.    I have reviewed the findings, diagnosis, plan and need for follow up with the patient.          Discharge Medication List as of 8/15/2022  5:02 PM      START taking these medications    Details   cephALEXin (KEFLEX) 500 MG capsule Take 1 capsule (500 mg) by mouth 3 times daily for 7 days, Disp-21 capsule, R-0, E-Prescribe             Final diagnoses:   Urinary tract infection       8/15/2022   [facility]     Talita Srinivasan PA-C  08/15/22 2012

## 2022-08-17 LAB — BACTERIA UR CULT: NORMAL

## 2022-10-04 PROBLEM — N80.30 ENDOMETRIOSIS OF PELVIC PERITONEUM: Status: ACTIVE | Noted: 2017-08-23

## 2023-03-31 ENCOUNTER — ANCILLARY PROCEDURE (OUTPATIENT)
Dept: GENERAL RADIOLOGY | Facility: OTHER | Age: 58
End: 2023-03-31
Attending: PHYSICIAN ASSISTANT
Payer: COMMERCIAL

## 2023-03-31 ENCOUNTER — OFFICE VISIT (OUTPATIENT)
Dept: FAMILY MEDICINE | Facility: OTHER | Age: 58
End: 2023-03-31
Attending: PHYSICIAN ASSISTANT
Payer: COMMERCIAL

## 2023-03-31 ENCOUNTER — ANCILLARY PROCEDURE (OUTPATIENT)
Dept: MAMMOGRAPHY | Facility: OTHER | Age: 58
End: 2023-03-31
Payer: COMMERCIAL

## 2023-03-31 ENCOUNTER — TELEPHONE (OUTPATIENT)
Dept: MAMMOGRAPHY | Facility: OTHER | Age: 58
End: 2023-03-31

## 2023-03-31 VITALS
WEIGHT: 210.5 LBS | BODY MASS INDEX: 31.9 KG/M2 | RESPIRATION RATE: 16 BRPM | OXYGEN SATURATION: 99 % | HEART RATE: 60 BPM | SYSTOLIC BLOOD PRESSURE: 119 MMHG | TEMPERATURE: 97.4 F | HEIGHT: 68 IN | DIASTOLIC BLOOD PRESSURE: 84 MMHG

## 2023-03-31 DIAGNOSIS — R05.3 CHRONIC COUGH: ICD-10-CM

## 2023-03-31 DIAGNOSIS — M25.552 HIP PAIN, LEFT: ICD-10-CM

## 2023-03-31 DIAGNOSIS — Z01.419 WELL WOMAN EXAM: Primary | ICD-10-CM

## 2023-03-31 DIAGNOSIS — Z12.4 CERVICAL CANCER SCREENING: ICD-10-CM

## 2023-03-31 DIAGNOSIS — R10.12 LEFT UPPER QUADRANT PAIN: ICD-10-CM

## 2023-03-31 DIAGNOSIS — E78.5 HYPERLIPIDEMIA LDL GOAL <100: ICD-10-CM

## 2023-03-31 DIAGNOSIS — Z11.4 SCREENING FOR HIV (HUMAN IMMUNODEFICIENCY VIRUS): ICD-10-CM

## 2023-03-31 DIAGNOSIS — K21.9 LPRD (LARYNGOPHARYNGEAL REFLUX DISEASE): ICD-10-CM

## 2023-03-31 DIAGNOSIS — N39.46 MIXED INCONTINENCE: ICD-10-CM

## 2023-03-31 DIAGNOSIS — Z78.0 ASYMPTOMATIC POSTMENOPAUSAL ESTROGEN DEFICIENCY: ICD-10-CM

## 2023-03-31 DIAGNOSIS — Z12.31 VISIT FOR SCREENING MAMMOGRAM: ICD-10-CM

## 2023-03-31 PROCEDURE — 77063 BREAST TOMOSYNTHESIS BI: CPT | Mod: TC | Performed by: RADIOLOGY

## 2023-03-31 PROCEDURE — 77067 SCR MAMMO BI INCL CAD: CPT | Mod: TC | Performed by: RADIOLOGY

## 2023-03-31 PROCEDURE — 73502 X-RAY EXAM HIP UNI 2-3 VIEWS: CPT | Mod: TC | Performed by: RADIOLOGY

## 2023-03-31 PROCEDURE — 99396 PREV VISIT EST AGE 40-64: CPT | Performed by: PHYSICIAN ASSISTANT

## 2023-03-31 RX ORDER — PANTOPRAZOLE SODIUM 40 MG/1
40 TABLET, DELAYED RELEASE ORAL DAILY
Qty: 90 TABLET | Refills: 3 | Status: SHIPPED | OUTPATIENT
Start: 2023-03-31 | End: 2024-09-06

## 2023-03-31 ASSESSMENT — ENCOUNTER SYMPTOMS
BREAST MASS: 0
DYSURIA: 0
NAUSEA: 0
FEVER: 0
WEAKNESS: 0
ABDOMINAL PAIN: 0
HEMATURIA: 0
DIARRHEA: 0
PALPITATIONS: 0
FREQUENCY: 0
EYE PAIN: 0
CHILLS: 0
SORE THROAT: 0
NERVOUS/ANXIOUS: 0
HEARTBURN: 0
ARTHRALGIAS: 1
SHORTNESS OF BREATH: 0
COUGH: 0
DIZZINESS: 0
CONSTIPATION: 0
HEADACHES: 0
JOINT SWELLING: 0
HEMATOCHEZIA: 0
PARESTHESIAS: 0
MYALGIAS: 0

## 2023-03-31 ASSESSMENT — PAIN SCALES - GENERAL: PAINLEVEL: NO PAIN (0)

## 2023-03-31 ASSESSMENT — ANXIETY QUESTIONNAIRES
4. TROUBLE RELAXING: NOT AT ALL
GAD7 TOTAL SCORE: 0
IF YOU CHECKED OFF ANY PROBLEMS ON THIS QUESTIONNAIRE, HOW DIFFICULT HAVE THESE PROBLEMS MADE IT FOR YOU TO DO YOUR WORK, TAKE CARE OF THINGS AT HOME, OR GET ALONG WITH OTHER PEOPLE: NOT DIFFICULT AT ALL
1. FEELING NERVOUS, ANXIOUS, OR ON EDGE: NOT AT ALL
3. WORRYING TOO MUCH ABOUT DIFFERENT THINGS: NOT AT ALL
5. BEING SO RESTLESS THAT IT IS HARD TO SIT STILL: NOT AT ALL
GAD7 TOTAL SCORE: 0
8. IF YOU CHECKED OFF ANY PROBLEMS, HOW DIFFICULT HAVE THESE MADE IT FOR YOU TO DO YOUR WORK, TAKE CARE OF THINGS AT HOME, OR GET ALONG WITH OTHER PEOPLE?: NOT DIFFICULT AT ALL
GAD7 TOTAL SCORE: 0
7. FEELING AFRAID AS IF SOMETHING AWFUL MIGHT HAPPEN: NOT AT ALL
7. FEELING AFRAID AS IF SOMETHING AWFUL MIGHT HAPPEN: NOT AT ALL
2. NOT BEING ABLE TO STOP OR CONTROL WORRYING: NOT AT ALL
6. BECOMING EASILY ANNOYED OR IRRITABLE: NOT AT ALL

## 2023-03-31 ASSESSMENT — PATIENT HEALTH QUESTIONNAIRE - PHQ9
SUM OF ALL RESPONSES TO PHQ QUESTIONS 1-9: 1
10. IF YOU CHECKED OFF ANY PROBLEMS, HOW DIFFICULT HAVE THESE PROBLEMS MADE IT FOR YOU TO DO YOUR WORK, TAKE CARE OF THINGS AT HOME, OR GET ALONG WITH OTHER PEOPLE: NOT DIFFICULT AT ALL
SUM OF ALL RESPONSES TO PHQ QUESTIONS 1-9: 1

## 2023-03-31 NOTE — PROGRESS NOTES
Answers for HPI/ROS submitted by the patient on 3/31/2023  If you checked off any problems, how difficult have these problems made it for you to do your work, take care of things at home, or get along with other people?: Not difficult at all  PHQ9 TOTAL SCORE: 1  JANEE 7 TOTAL SCORE: 0       SUBJECTIVE:   CC: Shweta is an 57 year old who presents for preventive health visit.   No flowsheet data found.Patient has been advised of split billing requirements and indicates understanding: Yes  Healthy Habits:     Getting at least 3 servings of Calcium per day:  NO    Bi-annual eye exam:  Yes    Dental care twice a year:  Yes    Sleep apnea or symptoms of sleep apnea:  None    Diet:  Regular (no restrictions)    Frequency of exercise:  1 day/week    Duration of exercise:  15-30 minutes    Taking medications regularly:  Yes    Medication side effects:  None    PHQ-2 Total Score: 0    Ability to successfully perform activities of daily living: Yes, no assistance needed  Home safety:  none identified   Hearing impairment: no         Musculoskeletal problem/pain      Duration: hip pain on left hip    Description  Location: on and off 6 months. Lateral hip but feels deep inside.     Intensity:  moderate    Accompanying signs and symptoms: none    History  Previous similar problem: YES  Previous evaluation:  none    Precipitating or alleviating factors:  Trauma or overuse: YES  Aggravating factors include: none    Therapies tried and outcome: nothing      Today's PHQ-2 Score:   PHQ-2 ( 1999 [employer]) 3/31/2023   Q1: Little interest or pleasure in doing things 0   Q2: Feeling down, depressed or hopeless 0   PHQ-2 Score 0   PHQ-2 Total Score (12-17 Years)- Positive if 3 or more points; Administer PHQ-A if positive -   Q1: Little interest or pleasure in doing things Not at all   Q2: Feeling down, depressed or hopeless Not at all   PHQ-2 Score 0       Have you ever done Advance Care Planning? (For example, a Health Directive, POLST, or  a discussion with a medical provider or your loved ones about your wishes): No, advance care planning information given to patient to review.  Patient plans to discuss their wishes with loved ones or provider.      Social History     Tobacco Use     Smoking status: Never     Smokeless tobacco: Never     Tobacco comments:     No passive exposure   Substance Use Topics     Alcohol use: Yes     Comment: Occasionally         Alcohol Use 3/31/2023   Prescreen: >3 drinks/day or >7 drinks/week? No     Reviewed orders with patient.  Reviewed health maintenance and updated orders accordingly - Yes  Lab work is in process  Labs reviewed in EPIC  BP Readings from Last 3 Encounters:   03/31/23 119/84   08/15/22 124/90   04/09/22 125/80    Wt Readings from Last 3 Encounters:   03/31/23 95.5 kg (210 lb 8 oz)   04/09/22 93 kg (205 lb)   11/30/21 94.3 kg (208 lb)                  Patient Active Problem List   Diagnosis     Heterozygous factor V Leiden mutation (H)     ACP (advance care planning)     Endometriosis of pelvis     Adenomyosis     JANEE (generalized anxiety disorder)     Gastroesophageal reflux disease with esophagitis     Psychophysiological insomnia     Other insomnia     Past Surgical History:   Procedure Laterality Date     COLONOSCOPY N/A 12/01/2014    Procedure: COLONOSCOPY;  Surgeon: Blanche Trivedi MD;  [facility]     CYSTOSCOPY N/A 08/23/2017    Procedure: CYSTOSCOPY;;  Surgeon: Santhosh May MD;  [facility]     ESOPHAGOSCOPY, GASTROSCOPY, DUODENOSCOPY (EGD), COMBINED N/A 03/05/2018    Procedure: COMBINED ESOPHAGOSCOPY, GASTROSCOPY, DUODENOSCOPY (EGD);  UPPER ENDOSCOPY WITH BIOPSY;  Surgeon: Lorenzo Berry MD;  [facility]     GYN SURGERY       HYSTERECTOMY TOTAL ABDOMINAL  08/23/2017    Procedure: HYSTERECTOMY TOTAL ABDOMINAL;;  Surgeon: Santhosh May MD;  [facility]     LAPAROSCOPIC ASSISTED HYSTERECTOMY VAGINAL, BILATERAL SALPINGO-OOPHORECTOMY, COMBINED N/A 08/23/2017    Procedure:  COMBINED LAPAROSCOPIC ASSISTED HYSTERECTOMY VAGINAL, SALPINGO-OOPHORECTOMY;  LAPAROSCOPIC ASSISTED VAGINAL HYSTERECTOMY, BILATERAL SALPINGO-OOPHORECTOMY ATTEMPTED, total abdominal hysterctomy with bilateral salpingo-oopherectomy, cystoscopy;  Surgeon: Santhosh May MD;  [facility]     tubal ligation  1991       Social History     Tobacco Use     Smoking status: Never     Smokeless tobacco: Never     Tobacco comments:     No passive exposure   Substance Use Topics     Alcohol use: Yes     Comment: Occasionally     Family History   Problem Relation Age of Onset     Asthma Father      Other - See Comments Father         Factor V Leiden (Carrier)     Heart Surgery Father 79        2 stents put in his heart.     Alzheimer Disease Mother         Lewie Body Dementia     Breast Cancer Mother      Diabetes Paternal Grandmother      Diabetes Maternal Grandmother      Other - See Comments Other         Niece, Factor V Leiden     Other - See Comments Daughter         Factor V Leiden     Other - See Comments Other         Nephew, Factor V Leiden, no family hx     Other - See Comments Sister         Factor V Leiden         Current Outpatient Medications   Medication Sig Dispense Refill     Ascorbic Acid (VITAMIN C) 500 MG CHEW        aspirin (ASA) 81 MG chewable tablet Take 81 mg by mouth daily       B Complex-Biotin-FA (B COMPLETE PO)        cholecalciferol (VITAMIN D3) 25 mcg (1000 units) capsule        diphenhydrAMINE HCl (GNP ALLERGY ANTIHISTAMINE PO) 10mg       fluticasone (FLONASE) 50 MCG/ACT nasal spray Spray 1 spray into both nostrils daily 18.2 mL 11     magnesium 250 MG tablet        pantoprazole (PROTONIX) 40 MG EC tablet Take 1 tablet (40 mg) by mouth daily 90 tablet 3     Prenatal Vit-Fe Fumarate-FA (M-VIT PO)        Probiotic Product (PROBIOTIC DAILY) CAPS        Allergies   Allergen Reactions     Seasonal Allergies      Recent Labs   Lab Test 07/12/19  0910 02/11/18  1120 02/05/18  0842 08/10/17  1038  06/21/17  1020   LDL 81  --   --   --  94   HDL 71  --   --   --  67   TRIG 58  --   --   --  45   ALT 45 46 47  --  33   CR 0.78 0.77 0.82   < > 0.78   GFRESTIMATED 85 78 73   < > 77   GFRESTBLACK >90 >90 88   < > >90   GFR Calc     POTASSIUM 4.0 4.1 4.3   < > 3.9   TSH 2.18  --   --   --  2.31    < > = values in this interval not displayed.        Breast Cancer Screening:  Any new diagnosis of family breast, ovarian, or bowel cancer? Yes       FHS-7:   Breast CA Risk Assessment (FHS-7) 2/1/2022 3/31/2023 3/31/2023   Did any of your first-degree relatives have breast or ovarian cancer? Yes Yes Unknown   Did any of your relatives have bilateral breast cancer? - No Unknown   Did any man in your family have breast cancer? - No No   Did any woman in your family have breast and ovarian cancer? - No Yes   Did any woman in your family have breast cancer before age 50 y? - No No   Do you have 2 or more relatives with breast and/or ovarian cancer? - No Unknown   Do you have 2 or more relatives with breast and/or bowel cancer? - No Unknown     click delete button to remove this line now  Mammogram Screening: Recommended mammography every 1-2 years with patient discussion and risk factor consideration  Pertinent mammograms are reviewed under the imaging tab.    History of abnormal Pap smear: Status post benign hysterectomy. Health Maintenance and Surgical History updated.  PAP / HPV Latest Ref Rng & Units 6/21/2017 10/9/2014   PAP (Historical) - OTHER-NIL, See Result OTHER-NIL, See Result   HPV16 NEG Negative -   HPV18 NEG Negative -   HRHPV NEG Negative -     Reviewed and updated as needed this visit by clinical staff   Tobacco  Allergies  Meds              Reviewed and updated as needed this visit by Provider     Meds               Past Medical History:   Diagnosis Date     Dermatitis fungal 06/30/2011     Factor V Leiden 06/28/2012     Family history of other blood disorders 06/30/2011     Heterozygous  factor V Leiden mutation (H) 10/09/2014     Heterozygous factor V Leiden mutation (H)      Palpitations 2006      Past Surgical History:   Procedure Laterality Date     COLONOSCOPY N/A 2014    Procedure: COLONOSCOPY;  Surgeon: Blanche Trivedi MD;  [facility]     CYSTOSCOPY N/A 2017    Procedure: CYSTOSCOPY;;  Surgeon: Santhosh May MD;  [facility]     ESOPHAGOSCOPY, GASTROSCOPY, DUODENOSCOPY (EGD), COMBINED N/A 2018    Procedure: COMBINED ESOPHAGOSCOPY, GASTROSCOPY, DUODENOSCOPY (EGD);  UPPER ENDOSCOPY WITH BIOPSY;  Surgeon: Lorenzo Berry MD;  [facility]     GYN SURGERY       HYSTERECTOMY TOTAL ABDOMINAL  2017    Procedure: HYSTERECTOMY TOTAL ABDOMINAL;;  Surgeon: Santhosh May MD;  [facility]     LAPAROSCOPIC ASSISTED HYSTERECTOMY VAGINAL, BILATERAL SALPINGO-OOPHORECTOMY, COMBINED N/A 2017    Procedure: COMBINED LAPAROSCOPIC ASSISTED HYSTERECTOMY VAGINAL, SALPINGO-OOPHORECTOMY;  LAPAROSCOPIC ASSISTED VAGINAL HYSTERECTOMY, BILATERAL SALPINGO-OOPHORECTOMY ATTEMPTED, total abdominal hysterctomy with bilateral salpingo-oopherectomy, cystoscopy;  Surgeon: Santhosh May MD;  [facility]     tubal ligation       OB History    Para Term  AB Living   2 2 2 0 0 0   SAB IAB Ectopic Multiple Live Births   0 0 0 0 0      # Outcome Date GA Lbr Shaheen/2nd Weight Sex Delivery Anes PTL Lv   2 Term            1 Term                Review of Systems   Constitutional: Negative for chills and fever.   HENT: Negative for congestion, ear pain, hearing loss and sore throat.    Eyes: Negative for pain and visual disturbance.   Respiratory: Negative for cough and shortness of breath.    Cardiovascular: Negative for chest pain, palpitations and peripheral edema.   Gastrointestinal: Negative for abdominal pain, constipation, diarrhea, heartburn, hematochezia and nausea.   Breasts:  Negative for tenderness, breast mass and discharge.   Genitourinary:  "Negative for dysuria, frequency, genital sores, hematuria, pelvic pain, urgency, vaginal bleeding and vaginal discharge.   Musculoskeletal: Positive for arthralgias. Negative for joint swelling and myalgias.   Skin: Negative for rash.   Neurological: Negative for dizziness, weakness, headaches and paresthesias.   Psychiatric/Behavioral: Negative for mood changes. The patient is not nervous/anxious.      CONSTITUTIONAL: NEGATIVE for fever, chills, change in weight  INTEGUMENTARY/SKIN: NEGATIVE for worrisome rashes, moles or lesions  EYES: NEGATIVE for vision changes or irritation  ENT: NEGATIVE for ear, mouth and throat problems  RESP: NEGATIVE for significant cough or SOB  BREAST: NEGATIVE for masses, tenderness or discharge  CV: NEGATIVE for chest pain, palpitations or peripheral edema  GI: NEGATIVE for nausea, abdominal pain, heartburn, or change in bowel habits  : NEGATIVE for unusual urinary or vaginal symptoms. No vaginal bleeding.  MUSCULOSKELETAL: positive for hip pain in her left hip , lateral side and inside the hip . Limited movement.   NEURO: NEGATIVE for weakness, dizziness or paresthesias  PSYCHIATRIC: NEGATIVE for changes in mood or affect      OBJECTIVE:   /84 (BP Location: Left arm, Patient Position: Sitting, Cuff Size: Adult Large)   Pulse 60   Temp 97.4  F (36.3  C) (Tympanic)   Resp 16   Ht 1.727 m (5' 8\")   Wt 95.5 kg (210 lb 8 oz)   LMP 07/12/2017   SpO2 99%   BMI 32.01 kg/m    Physical Exam  GENERAL: healthy, alert and no distress  EYES: Eyes grossly normal to inspection, PERRL and conjunctivae and sclerae normal  HENT: ear canals and TM's normal, nose and mouth without ulcers or lesions  NECK: no adenopathy, no asymmetry, masses, or scars and thyroid normal to palpation  RESP: lungs clear to auscultation - no rales, rhonchi or wheezes  BREAST: normal without masses, tenderness or nipple discharge and no palpable axillary masses or adenopathy  CV: regular rate and rhythm, normal " S1 S2, no S3 or S4, no murmur, click or rub, no peripheral edema and peripheral pulses strong  ABDOMEN: soft, nontender, no hepatosplenomegaly, no masses and bowel sounds normal  MS: no gross musculoskeletal defects noted, no edema  SKIN: no suspicious lesions or rashes  NEURO: Normal strength and tone, mentation intact and speech normal  PSYCH: mentation appears normal, affect normal/bright    Diagnostic Test Results:  Labs reviewed in Epic  Results for orders placed or performed in visit on 03/31/23 (from the past 24 hour(s))   MA Screen Bilateral w/Sincere    Narrative    BILATERAL FULL FIELD DIGITAL SCREENING MAMMOGRAM WITH TOMOSYNTHESIS    Performed on: 3/31/23    Compared to: 02/01/2022, 12/17/2020, 07/11/2019, 03/19/2018, and   10/30/2015    Technique:  This study was evaluated with the assistance of Computer-Aided   Detection.  Breast Tomosynthesis was used in interpretation.    Findings: The breasts have scattered areas of fibroglandular density.    There is no radiographic evidence of malignancy.     Impression    IMPRESSION: ACR BI-RADS Category 1: Negative    RECOMMENDED FOLLOW-UP: Annual routine screening mammogram    The results and recommendations of this examination will be communicated   to the patient.        Frank Fuentes MD         ASSESSMENT/PLAN:       ICD-10-CM    1. Well woman exam  Z01.419       2. Screening for HIV (human immunodeficiency virus)  Z11.4 HIV Antigen Antibody Combo      3. Cervical cancer screening  Z12.4     has had hysterecomy needing this removed       4. Chronic cough  R05.3 pantoprazole (PROTONIX) 40 MG EC tablet      5. LPRD (laryngopharyngeal reflux disease)  K21.9 pantoprazole (PROTONIX) 40 MG EC tablet      6. Asymptomatic postmenopausal estrogen deficiency  Z78.0 DEXA HIP/PELVIS/SPINE - Future      7. Hip pain, left  M25.552 XR Hip Left 2-3 Views (Clinic Performed)      8. Left upper quadrant pain  R10.12 US Abdomen Complete     CBC with platelets and  differential     Comprehensive metabolic panel (BMP + Alb, Alk Phos, ALT, AST, Total. Bili, TP)     Lipase      9. Hyperlipidemia LDL goal <100  E78.5 TSH with free T4 reflex     Lipid Profile (Chol, Trig, HDL, LDL calc)      10. Mixed incontinence  N39.46 UA reflex to Microscopic and Culture - [place]          Patient has been advised of split billing requirements and indicates understanding: Yes      COUNSELING:  Reviewed preventive health counseling, as reflected in patient instructions       Regular exercise       Healthy diet/nutrition       Vision screening       Family planning       Osteoporosis prevention/bone health       Advance Care Planning        She reports that she has never smoked. She has never used smokeless tobacco.      DESTIN Elizabeth  [facility] - [place]

## 2023-04-03 ENCOUNTER — LAB (OUTPATIENT)
Dept: LAB | Facility: OTHER | Age: 58
End: 2023-04-03
Payer: COMMERCIAL

## 2023-04-03 DIAGNOSIS — E78.5 HYPERLIPIDEMIA LDL GOAL <100: ICD-10-CM

## 2023-04-03 DIAGNOSIS — Z11.4 SCREENING FOR HIV (HUMAN IMMUNODEFICIENCY VIRUS): ICD-10-CM

## 2023-04-03 DIAGNOSIS — R10.12 LEFT UPPER QUADRANT PAIN: ICD-10-CM

## 2023-04-03 DIAGNOSIS — N39.46 MIXED INCONTINENCE: ICD-10-CM

## 2023-04-03 LAB
ALBUMIN SERPL BCG-MCNC: 4.4 G/DL (ref 3.5–5.2)
ALBUMIN UR-MCNC: NEGATIVE MG/DL
ALP SERPL-CCNC: 108 U/L (ref 35–104)
ALT SERPL W P-5'-P-CCNC: 32 U/L (ref 10–35)
ANION GAP SERPL CALCULATED.3IONS-SCNC: 10 MMOL/L (ref 7–15)
APPEARANCE UR: CLEAR
AST SERPL W P-5'-P-CCNC: 25 U/L (ref 10–35)
BASOPHILS # BLD AUTO: 0 10E3/UL (ref 0–0.2)
BASOPHILS NFR BLD AUTO: 0 %
BILIRUB SERPL-MCNC: 0.9 MG/DL
BILIRUB UR QL STRIP: NEGATIVE
BUN SERPL-MCNC: 18.4 MG/DL (ref 8–23)
CALCIUM SERPL-MCNC: 9.8 MG/DL (ref 8.6–10)
CHLORIDE SERPL-SCNC: 102 MMOL/L (ref 98–107)
CHOLEST SERPL-MCNC: 171 MG/DL
COLOR UR AUTO: ABNORMAL
CREAT SERPL-MCNC: 0.92 MG/DL (ref 0.51–0.95)
DEPRECATED HCO3 PLAS-SCNC: 27 MMOL/L (ref 22–29)
EOSINOPHIL # BLD AUTO: 0.2 10E3/UL (ref 0–0.7)
EOSINOPHIL NFR BLD AUTO: 3 %
ERYTHROCYTE [DISTWIDTH] IN BLOOD BY AUTOMATED COUNT: 13.2 % (ref 10–15)
GFR SERPL CREATININE-BSD FRML MDRD: 71 ML/MIN/1.73M2
GLUCOSE SERPL-MCNC: 111 MG/DL (ref 70–99)
GLUCOSE UR STRIP-MCNC: NEGATIVE MG/DL
HCT VFR BLD AUTO: 42.9 % (ref 35–47)
HDLC SERPL-MCNC: 65 MG/DL
HGB BLD-MCNC: 14.5 G/DL (ref 11.7–15.7)
HGB UR QL STRIP: NEGATIVE
IMM GRANULOCYTES # BLD: 0 10E3/UL
IMM GRANULOCYTES NFR BLD: 0 %
KETONES UR STRIP-MCNC: NEGATIVE MG/DL
LDLC SERPL CALC-MCNC: 89 MG/DL
LEUKOCYTE ESTERASE UR QL STRIP: ABNORMAL
LIPASE SERPL-CCNC: 67 U/L (ref 13–60)
LYMPHOCYTES # BLD AUTO: 1 10E3/UL (ref 0.8–5.3)
LYMPHOCYTES NFR BLD AUTO: 14 %
MCH RBC QN AUTO: 29.5 PG (ref 26.5–33)
MCHC RBC AUTO-ENTMCNC: 33.8 G/DL (ref 31.5–36.5)
MCV RBC AUTO: 87 FL (ref 78–100)
MONOCYTES # BLD AUTO: 0.6 10E3/UL (ref 0–1.3)
MONOCYTES NFR BLD AUTO: 8 %
MUCOUS THREADS #/AREA URNS LPF: PRESENT /LPF
NEUTROPHILS # BLD AUTO: 5.5 10E3/UL (ref 1.6–8.3)
NEUTROPHILS NFR BLD AUTO: 75 %
NITRATE UR QL: NEGATIVE
NONHDLC SERPL-MCNC: 106 MG/DL
NRBC # BLD AUTO: 0 10E3/UL
NRBC BLD AUTO-RTO: 0 /100
PH UR STRIP: 5 [PH] (ref 4.7–8)
PLATELET # BLD AUTO: 241 10E3/UL (ref 150–450)
POTASSIUM SERPL-SCNC: 4.2 MMOL/L (ref 3.4–5.3)
PROT SERPL-MCNC: 7.5 G/DL (ref 6.4–8.3)
RBC # BLD AUTO: 4.92 10E6/UL (ref 3.8–5.2)
RBC URINE: 1 /HPF
SODIUM SERPL-SCNC: 139 MMOL/L (ref 136–145)
SP GR UR STRIP: 1.01 (ref 1–1.03)
SQUAMOUS EPITHELIAL: 0 /HPF
TRIGL SERPL-MCNC: 83 MG/DL
TSH SERPL DL<=0.005 MIU/L-ACNC: 1.82 UIU/ML (ref 0.3–4.2)
UROBILINOGEN UR STRIP-MCNC: NORMAL MG/DL
WBC # BLD AUTO: 7.3 10E3/UL (ref 4–11)
WBC URINE: 2 /HPF

## 2023-04-03 PROCEDURE — 80050 GENERAL HEALTH PANEL: CPT

## 2023-04-03 PROCEDURE — 87389 HIV-1 AG W/HIV-1&-2 AB AG IA: CPT

## 2023-04-03 PROCEDURE — 36415 COLL VENOUS BLD VENIPUNCTURE: CPT

## 2023-04-03 PROCEDURE — 81001 URINALYSIS AUTO W/SCOPE: CPT

## 2023-04-03 PROCEDURE — 87088 URINE BACTERIA CULTURE: CPT

## 2023-04-03 PROCEDURE — 87086 URINE CULTURE/COLONY COUNT: CPT

## 2023-04-03 PROCEDURE — 83690 ASSAY OF LIPASE: CPT

## 2023-04-03 PROCEDURE — 80061 LIPID PANEL: CPT

## 2023-04-04 DIAGNOSIS — N30.00 ACUTE CYSTITIS WITHOUT HEMATURIA: Primary | ICD-10-CM

## 2023-04-04 LAB
BACTERIA UR CULT: ABNORMAL
HIV 1+2 AB+HIV1 P24 AG SERPL QL IA: NONREACTIVE

## 2023-04-04 RX ORDER — CEFPROZIL 250 MG/1
250 TABLET, FILM COATED ORAL 2 TIMES DAILY
Qty: 14 TABLET | Refills: 0 | Status: SHIPPED | OUTPATIENT
Start: 2023-04-04 | End: 2023-09-11

## 2023-04-04 NOTE — RESULT ENCOUNTER NOTE
Liver is normal but has a lipase on upper limits of normal. Urine is fine.  No elevation in WBC.  Lets keep an eye on this.  Recheck in 3 months.

## 2023-04-05 NOTE — RESULT ENCOUNTER NOTE
Has culture positive infection.  Sent in Cefzil. Please have her take this and let me know if she is not feeling better.

## 2023-04-21 ENCOUNTER — HOSPITAL ENCOUNTER (OUTPATIENT)
Dept: BONE DENSITY | Facility: HOSPITAL | Age: 58
Discharge: HOME | End: 2023-04-21
Attending: PHYSICIAN ASSISTANT
Payer: COMMERCIAL

## 2023-04-21 ENCOUNTER — HOSPITAL ENCOUNTER (OUTPATIENT)
Dept: ULTRASOUND IMAGING | Facility: HOSPITAL | Age: 58
Discharge: HOME | End: 2023-04-21
Attending: PHYSICIAN ASSISTANT
Payer: COMMERCIAL

## 2023-04-21 DIAGNOSIS — Z78.0 ASYMPTOMATIC POSTMENOPAUSAL ESTROGEN DEFICIENCY: ICD-10-CM

## 2023-04-21 DIAGNOSIS — R10.12 LEFT UPPER QUADRANT PAIN: ICD-10-CM

## 2023-04-21 PROCEDURE — 77080 DXA BONE DENSITY AXIAL: CPT

## 2023-04-21 PROCEDURE — 76700 US EXAM ABDOM COMPLETE: CPT

## 2023-05-10 NOTE — Clinical Note
"Gastroenterology Progress Note    Subjective:  Patient doing well post PEG placement- tube feedings started without issues.     Objective:    ROS:    Review of Systems   Constitutional:  Negative for diaphoresis and malaise/fatigue.   Respiratory:  Negative for shortness of breath and wheezing.    Gastrointestinal:  Negative for abdominal pain, blood in stool, constipation, diarrhea and melena.   Psychiatric/Behavioral:  The patient is not nervous/anxious.        Vital Signs:  /73   Pulse 75   Temp 98.4 °F (36.9 °C) (Oral)   Resp (P) 18   Ht 5' 7.99" (1.727 m)   Wt 74.4 kg (164 lb)   SpO2 97%   BMI 24.94 kg/m²   Body mass index is 24.94 kg/m².    Physical Exam:    Physical Exam  Constitutional:       General: He is not in acute distress.  HENT:      Head: Normocephalic.   Eyes:      Extraocular Movements: Extraocular movements intact.   Cardiovascular:      Pulses: Normal pulses.   Pulmonary:      Effort: Pulmonary effort is normal.   Abdominal:      General: Abdomen is flat. Bowel sounds are normal. There is no distension.      Tenderness: There is no guarding.      Comments: Well healed scar from previous PEG noted in [facility]. Well healed vertical scar down mid abdomen s/p cardiac surgery  PEG tube in place to mid-abdominal region with bumper at 2.5cm, c/d/I and nontender to palpation. Abdominal binder in place    Skin:     General: Skin is warm and dry.   Neurological:      Mental Status: He is alert.   Psychiatric:         Mood and Affect: Mood normal.         Thought Content: Thought content normal.       Labs:  Recent Results (from the past 24 hour(s))   Basic Metabolic Panel    Collection Time: 05/10/23  4:32 AM   Result Value Ref Range    Sodium Level 135 (L) 136 - 145 mmol/L    Potassium Level 4.5 3.5 - 5.1 mmol/L    Chloride 105 98 - 107 mmol/L    Carbon Dioxide 20 (L) 23 - 31 mmol/L    Glucose Level 117 (H) 82 - 115 mg/dL    Blood Urea Nitrogen 15.7 8.4 - 25.7 mg/dL    Creatinine 0.75 0.73 - 1.18 " Hysterectomy with no cervix 2017.  She doesn't need a pap smear any more .  mg/dL    BUN/Creatinine Ratio 21     Calcium Level Total 9.2 8.8 - 10.0 mg/dL    Anion Gap 10.0 mEq/L    eGFR >60 mls/min/1.73/m2   Magnesium    Collection Time: 05/10/23  4:32 AM   Result Value Ref Range    Magnesium Level 2.10 1.60 - 2.60 mg/dL   CBC with Differential    Collection Time: 05/10/23  4:32 AM   Result Value Ref Range    WBC 10.98 4.50 - 11.50 x10(3)/mcL    RBC 5.28 4.70 - 6.10 x10(6)/mcL    Hgb 15.2 14.0 - 18.0 g/dL    Hct 45.7 42.0 - 52.0 %    MCV 86.6 80.0 - 94.0 fL    MCH 28.8 27.0 - 31.0 pg    MCHC 33.3 33.0 - 36.0 g/dL    RDW 13.7 11.5 - 17.0 %    Platelet 317 130 - 400 x10(3)/mcL    MPV 9.8 7.4 - 10.4 fL    Neut % 76.6 %    Lymph % 13.6 %    Mono % 8.7 %    Eos % 0.3 %    Basophil % 0.2 %    Lymph # 1.49 0.6 - 4.6 x10(3)/mcL    Neut # 8.41 2.1 - 9.2 x10(3)/mcL    Mono # 0.96 0.1 - 1.3 x10(3)/mcL    Eos # 0.03 0 - 0.9 x10(3)/mcL    Baso # 0.02 <=0.2 x10(3)/mcL    IG# 0.07 (H) 0 - 0.04 x10(3)/mcL    IG% 0.6 %    NRBC% 0.0 %         Assessment/Plan:  62 year old male unknown to our group with Hx right MCA stroke with residual left-sided weakness, facial droop, coronary artery disease, hypertension who is s/p elective coil embolization of left AMBER aneurysm who was initially admitted to ICU for monitoring due to delayed recovery from anesthesia.   GI consulted for oropharyngeal dysphagia and PEG tube eval.      Oropharyngeal dysphagia  - patient s/p successful PEG placement with TF started without issues   - Plavix resumed today   2. S/p coli embolization of AMBER aneurysm  3. HTN     Thank you for allowing us to participate in the care of Marino Mccrary.    GI will sign off at this time. Please call back with any questions        Geovanna Pandey PA-C  Gastroenterology  [facility]

## 2023-09-11 ENCOUNTER — HOSPITAL ENCOUNTER (EMERGENCY)
Facility: HOSPITAL | Age: 58
Discharge: HOME | End: 2023-09-11
Attending: NURSE PRACTITIONER | Admitting: NURSE PRACTITIONER
Payer: COMMERCIAL

## 2023-09-11 VITALS
HEART RATE: 90 BPM | SYSTOLIC BLOOD PRESSURE: 124 MMHG | DIASTOLIC BLOOD PRESSURE: 61 MMHG | RESPIRATION RATE: 16 BRPM | TEMPERATURE: 98 F | OXYGEN SATURATION: 97 %

## 2023-09-11 DIAGNOSIS — N30.00 ACUTE CYSTITIS WITHOUT HEMATURIA: ICD-10-CM

## 2023-09-11 LAB
ALBUMIN UR-MCNC: NEGATIVE MG/DL
APPEARANCE UR: CLEAR
BILIRUB UR QL STRIP: NEGATIVE
COLOR UR AUTO: YELLOW
GLUCOSE UR STRIP-MCNC: NEGATIVE MG/DL
HGB UR QL STRIP: NEGATIVE
KETONES UR STRIP-MCNC: NEGATIVE MG/DL
LEUKOCYTE ESTERASE UR QL STRIP: ABNORMAL
MUCOUS THREADS #/AREA URNS LPF: PRESENT /LPF
NITRATE UR QL: NEGATIVE
PH UR STRIP: 6 [PH] (ref 4.7–8)
RBC URINE: 4 /HPF
SP GR UR STRIP: 1.02 (ref 1–1.03)
SQUAMOUS EPITHELIAL: 0 /HPF
UROBILINOGEN UR STRIP-MCNC: NORMAL MG/DL
WBC URINE: 81 /HPF

## 2023-09-11 PROCEDURE — 81001 URINALYSIS AUTO W/SCOPE: CPT | Performed by: NURSE PRACTITIONER

## 2023-09-11 PROCEDURE — G0463 HOSPITAL OUTPT CLINIC VISIT: HCPCS

## 2023-09-11 PROCEDURE — 99213 OFFICE O/P EST LOW 20 MIN: CPT | Performed by: NURSE PRACTITIONER

## 2023-09-11 PROCEDURE — 87086 URINE CULTURE/COLONY COUNT: CPT | Performed by: NURSE PRACTITIONER

## 2023-09-11 RX ORDER — NITROFURANTOIN 25; 75 MG/1; MG/1
100 CAPSULE ORAL 2 TIMES DAILY
Qty: 10 CAPSULE | Refills: 0 | Status: SHIPPED | OUTPATIENT
Start: 2023-09-11 | End: 2023-09-16

## 2023-09-11 RX ORDER — PHENAZOPYRIDINE HYDROCHLORIDE 200 MG/1
200 TABLET, FILM COATED ORAL 3 TIMES DAILY PRN
Qty: 9 TABLET | Refills: 0 | Status: SHIPPED | OUTPATIENT
Start: 2023-09-11 | End: 2023-09-14

## 2023-09-11 ASSESSMENT — ACTIVITIES OF DAILY LIVING (ADL): ADLS_ACUITY_SCORE: 35

## 2023-09-11 NOTE — ED TRIAGE NOTES
Patient presents to urgent care with symptoms of a UTI. Symptoms started a few days ago with burning and urgency. Denies blood in urine.

## 2023-09-11 NOTE — ED TRIAGE NOTES
Patient presents with c/o urinary frequency and dysuria that started a few days ago, went away, and came back again. Denies any blood in urine, denies any back/flank/abdominal pain,

## 2023-09-11 NOTE — DISCHARGE INSTRUCTIONS
Macrobid as prescribed. Complete all medications even if your symptoms are gone. May take with food unless instructed not to, to prevent stomach upset.    -Increase fluids.   -Complete all antibiotics even if feeling better.    -Taking antibiotics with food may decrease the symptoms, of an upset stomach, that can occur when taking antibiotics. Antibiotics frequently cause diarrhea. Probiotics or yogurt may help prevent or decrease these symptoms.     Pyridium as needed for 1-2 days for painful urination and bladder spasms. This will turn your urine a bright orange. May purchase over-the-counter.  ncrease fluids, (at 6 to 8 glasses daily unless restricted for there medical reasons).   May use acetaminophen for fever.   May use cranberry juice or over-the-counter cranberry tabs for prevention. It makes the urine acidic so bacteria has a harder time growing.  PREVENTING FUTURE INFECTIONS: Keep genital area clean and dry. Personal hygiene wiping from front to back after elimination. Do not hold your urine for long periods of time. Make sure and relieve yourself at least every two hours. Avoid sexual intercourse until your symptoms are gone. Urinate before and right after intercourse to flush out the bladder. Avoid douches and feminine sprays. Wear cotton underwear and cotton-lined panty hose; avoid tight-fitting pants. Avoid caffeine, alcohol, and spicy foods as they can irritate the bladder.    FOLLOW UP Return to this facility or see your doctor if ALL symptoms are not gone after three days of treatment.    GET PROMPT MEDICAL ATTENTION if any of the following occur:  Fever over 101 F (38.3 C), No improvement by the third day of treatment, Increasing back or abdominal pain, vomiting, unable to keep fluids or medicine down, weakness, dizziness, or fainting, vaginal discharge, pain, redness, or swelling of the vaginal area

## 2023-09-11 NOTE — ED PROVIDER NOTES
History     Chief Complaint   Patient presents with    Dysuria     HPI  Shweta Harris is a 57 year old female who presents with a 3-day history of painful urination, frequency, and urgency.  No OTC medications have been taken.  History of UTIs; last episode greater than 1 year.  Sexually active; no concerns regarding STDs.  Has had total hysterectomy.  Non-smoker.  Denies fevers, chills, nausea, vomiting, diarrhea, abdominal/back/pelvic/flank pain, shortness of breath, headaches, dizziness, and lightheadedness.    URINARY TRACT SYMPTOMS    Duration: three days ago    Description  dysuria, frequency, and urgency  Intensity:  mild  Accompanying signs and symptoms:  Fever/chills: no   Flank pain no   Nausea and vomiting: no   Vaginal symptoms: none  Abdominal/Pelvic Pain: no   History  History of frequent UTI's: YES- last episode greater than one year  History of kidney stones: no   Sexually Active: YES- no concerns regarding STD's  Possibility of pregnancy: had total hysterrectomy  Precipitating or alleviating factors: None  Therapies tried and outcome: none       Allergies:  Allergies   Allergen Reactions    Seasonal Allergies        Problem List:    Patient Active Problem List    Diagnosis Date Noted    Other insomnia 07/07/2021     Priority: Medium    JANEE (generalized anxiety disorder) 04/19/2018     Priority: Medium    Gastroesophageal reflux disease with esophagitis 04/19/2018     Priority: Medium    Psychophysiological insomnia 04/19/2018     Priority: Medium    Adenomyosis 10/06/2017     Priority: Medium    Endometriosis of pelvis 08/23/2017     Priority: Medium    ACP (advance care planning) 06/21/2017     Priority: Medium     Advance Care Planning 6/21/2017: ACP Review of Chart / Resources Provided:  Reviewed chart for advance care plan.  Shweta Harris has been provided information and resources to begin or update their advance care plan.  Added by Abril Peterson            Heterozygous factor V  Leiden mutation (H) 10/09/2014     Priority: Medium        Past Medical History:    Past Medical History:   Diagnosis Date    Dermatitis fungal 06/30/2011    Factor V Leiden 06/28/2012    Family history of other blood disorders 06/30/2011    Heterozygous factor V Leiden mutation (H) 10/09/2014    Heterozygous factor V Leiden mutation (H)     Palpitations 09/22/2006       Past Surgical History:    Past Surgical History:   Procedure Laterality Date    COLONOSCOPY N/A 12/01/2014    Procedure: COLONOSCOPY;  Surgeon: Blanche Trivedi MD;  Location: [facility]    CYSTOSCOPY N/A 08/23/2017    Procedure: CYSTOSCOPY;;  Surgeon: Santhosh May MD;  Location: [facility]    ESOPHAGOSCOPY, GASTROSCOPY, DUODENOSCOPY (EGD), COMBINED N/A 03/05/2018    Procedure: COMBINED ESOPHAGOSCOPY, GASTROSCOPY, DUODENOSCOPY (EGD);  UPPER ENDOSCOPY WITH BIOPSY;  Surgeon: Lorenzo Berry MD;  Location: [facility]    HYSTERECTOMY TOTAL ABDOMINAL  08/23/2017    Procedure: HYSTERECTOMY TOTAL ABDOMINAL;;  Surgeon: Santhosh May MD;  Location: [facility]    HYSTERECTOMY, PAP NO LONGER INDICATED N/A 08/23/2017    Cervix removed per path report.    LAPAROSCOPIC ASSISTED HYSTERECTOMY VAGINAL, BILATERAL SALPINGO-OOPHORECTOMY, COMBINED N/A 08/23/2017    Procedure: COMBINED LAPAROSCOPIC ASSISTED HYSTERECTOMY VAGINAL, SALPINGO-OOPHORECTOMY;  LAPAROSCOPIC ASSISTED VAGINAL HYSTERECTOMY, BILATERAL SALPINGO-OOPHORECTOMY ATTEMPTED, total abdominal hysterctomy with bilateral salpingo-oopherectomy, cystoscopy;  Surgeon: Santhosh May MD;  Location: [facility]    tubal ligation  1991       Family History:    Family History   Problem Relation Age of Onset    Asthma Father     Other - See Comments Father         Factor V Leiden ([place])    Heart Surgery Father 79        2 stents put in his heart.    Alzheimer Disease Mother         Lewie Body Dementia    Breast Cancer Mother     Diabetes Paternal Grandmother     Diabetes Maternal Grandmother     Other - See Comments Other          Niece, Factor V Leiden    Other - See Comments Daughter         Factor V Leiden    Other - See Comments Other         Nephew, Factor V Leiden, no family hx    Other - See Comments Sister         Factor V Leiden       Social History:  Marital Status:   [4]  Social History     Tobacco Use    Smoking status: Never    Smokeless tobacco: Never    Tobacco comments:     No passive exposure   Vaping Use    Vaping Use: Never used   Substance Use Topics    Alcohol use: Yes     Comment: Occasionally    Drug use: No        Medications:    Ascorbic Acid (VITAMIN C) 500 MG CHEW  aspirin (ASA) 81 MG chewable tablet  cholecalciferol (VITAMIN D3) 25 mcg (1000 units) capsule  diphenhydrAMINE HCl (GNP ALLERGY ANTIHISTAMINE PO)  fluticasone (FLONASE) 50 MCG/ACT nasal spray  magnesium 250 MG tablet  nitroFURantoin macrocrystal-monohydrate (MACROBID) 100 MG capsule  pantoprazole (PROTONIX) 40 MG EC tablet  phenazopyridine (PYRIDIUM) 200 MG tablet  Probiotic Product (PROBIOTIC DAILY) CAPS          Review of Systems   Constitutional:  Positive for activity change. Negative for chills, fatigue and fever.   Respiratory:  Negative for shortness of breath.    Gastrointestinal:  Negative for abdominal pain, diarrhea, nausea and vomiting.        Last BM yesterday normal for her   Genitourinary:  Positive for dysuria, frequency and urgency. Negative for flank pain, hematuria, pelvic pain, vaginal bleeding and vaginal discharge.   Musculoskeletal:  Negative for back pain.   Neurological:  Negative for dizziness, light-headedness and headaches.       Physical Exam   BP: 124/61  Pulse: 90  Temp: 98  F (36.7  C)  Resp: 16  SpO2: 97 %      Physical Exam  Vitals and nursing note reviewed.   Constitutional:       General: She is in acute distress (Mild).      Appearance: She is overweight.   Cardiovascular:      Rate and Rhythm: Normal rate and regular rhythm.      Heart sounds: Normal heart sounds. No murmur heard.  Pulmonary:      Effort:  Pulmonary effort is normal. No respiratory distress.      Breath sounds: Normal breath sounds. No wheezing, rhonchi or rales.   Abdominal:      General: Abdomen is flat. Bowel sounds are normal. There is no distension.      Palpations: Abdomen is soft. There is no hepatomegaly or splenomegaly.      Tenderness: There is no abdominal tenderness. There is no right CVA tenderness, left CVA tenderness or guarding.      Hernia: No hernia is present.   Skin:     General: Skin is warm and dry.   Neurological:      Mental Status: She is alert and oriented to person, place, and time.   Psychiatric:         Behavior: Behavior normal.         ED Course                 Procedures             Results for orders placed or performed during the hospital encounter of 09/11/23 (from the past 24 hour(s))   UA with Microscopic reflex to Culture    Specimen: Urine, Midstream   Result Value Ref Range    Color Urine Yellow Colorless, Straw, Light Yellow, Yellow    Appearance Urine Clear Clear    Glucose Urine Negative Negative mg/dL    Bilirubin Urine Negative Negative    Ketones Urine Negative Negative mg/dL    Specific Gravity Urine 1.024 1.003 - 1.035    Blood Urine Negative Negative    pH Urine 6.0 4.7 - 8.0    Protein Albumin Urine Negative Negative mg/dL    Urobilinogen Urine Normal Normal, 2.0 mg/dL    Nitrite Urine Negative Negative    Leukocyte Esterase Urine Moderate (A) Negative    Mucus Urine Present (A) None Seen /LPF    RBC Urine 4 (H) <=2 /HPF    WBC Urine 81 (H) <=5 /HPF    Squamous Epithelials Urine 0 <=1 /HPF    Narrative    Urine Culture ordered based on laboratory criteria       Medications - No data to display    Assessments & Plan (with Medical Decision Making)     I have reviewed the nursing notes.    I have reviewed the findings, diagnosis, plan and need for follow up with the patient.  (N30.00) Acute cystitis without hematuria  Comment: 57 year old female who presents with a 3-day history of painful urination,  frequency, and urgency.  No OTC medications have been taken.  History of UTIs; last episode greater than 1 year.  Sexually active; no concerns regarding STDs.  Has had total hysterectomy.  Non-smoker.  Denies fevers, chills, nausea, vomiting, diarrhea, abdominal/back/pelvic/flank pain, shortness of breath, headaches, dizziness, and lightheadedness.    MDM:NHT. Lungs CTA  Abdominal assessment negative    UA positive moderate amount leukocyte esterase and WBCs greater than 81.  See pending    Plan: Macrobid twice daily for 5 days and Pyridium.  Education provided and/or discussed for this/these medications and UTI. Complete all medications even if your symptoms are gone. May take with food unless instructed not to, to prevent stomach upset.    -Increase fluids.   -Complete all antibiotics even if feeling better.    -Taking antibiotics with food may decrease the symptoms, of an upset stomach, that can occur when taking antibiotics. Antibiotics frequently cause diarrhea. Probiotics or yogurt may help prevent or decrease these symptoms.     FOLLOW UP Return to this facility or see your doctor if ALL symptoms are not gone after three days of treatment.    GET PROMPT MEDICAL ATTENTION if any of the following occur:  Fever over 101 F (38.3 C), No improvement by the third day of treatment, Increasing back or abdominal pain, vomiting, unable to keep fluids or medicine down, weakness, dizziness, or fainting,  discharge from genitalia, pain, redness, or swelling.  These discharge instructions and medications were reviewed with her and understanding verbalized.    This document was prepared using a combination of typing and voice generated software.  While every attempt was made for accuracy, spelling and grammatical errors may exist.    Discharge Medication List as of 9/11/2023  3:18 PM        START taking these medications    Details   nitroFURantoin macrocrystal-monohydrate (MACROBID) 100 MG capsule Take 1 capsule (100 mg) by  mouth 2 times daily for 5 days, Disp-10 capsule, R-0, E-Prescribe      phenazopyridine (PYRIDIUM) 200 MG tablet Take 1 tablet (200 mg) by mouth 3 times daily as needed for irritation, Disp-9 tablet, R-0, E-Prescribe             Final diagnoses:   Acute cystitis without hematuria       9/11/2023   [facility]         Lizeth Schulz, MACARENA  09/11/23 [id number]       Lizeth Schulz, CNP  09/19/23 [id number]

## 2023-09-13 LAB — BACTERIA UR CULT: ABNORMAL

## 2023-09-19 ASSESSMENT — ENCOUNTER SYMPTOMS
FLANK PAIN: 0
FEVER: 0
VOMITING: 0
NAUSEA: 0
FATIGUE: 0
DIARRHEA: 0
BACK PAIN: 0
FREQUENCY: 1
HEMATURIA: 0
ABDOMINAL PAIN: 0
DIZZINESS: 0
ACTIVITY CHANGE: 1
CHILLS: 0
SHORTNESS OF BREATH: 0
LIGHT-HEADEDNESS: 0
DYSURIA: 1
HEADACHES: 0

## 2024-09-05 DIAGNOSIS — R05.3 CHRONIC COUGH: ICD-10-CM

## 2024-09-05 DIAGNOSIS — K21.9 LPRD (LARYNGOPHARYNGEAL REFLUX DISEASE): ICD-10-CM

## 2024-09-05 NOTE — TELEPHONE ENCOUNTER
pantoprazole       Last Written Prescription Date:  3/31/23  Last Fill Quantity: 90,   # refills: 3  Last Office Visit: 31/31/23  Future Office visit:       Routing refill request to provider for review/approval because:  Overdue to be seen

## 2024-09-06 RX ORDER — PANTOPRAZOLE SODIUM 40 MG/1
40 TABLET, DELAYED RELEASE ORAL DAILY
Qty: 30 TABLET | Refills: 0 | Status: SHIPPED | OUTPATIENT
Start: 2024-09-06

## 2024-10-02 ENCOUNTER — OFFICE VISIT (OUTPATIENT)
Dept: FAMILY MEDICINE | Facility: OTHER | Age: 59
End: 2024-10-02
Attending: PHYSICIAN ASSISTANT
Payer: COMMERCIAL

## 2024-10-02 ENCOUNTER — ORDERS ONLY (AUTO-RELEASED) (OUTPATIENT)
Dept: FAMILY MEDICINE | Facility: OTHER | Age: 59
End: 2024-10-02

## 2024-10-02 VITALS
WEIGHT: 199.5 LBS | HEART RATE: 80 BPM | TEMPERATURE: 97 F | RESPIRATION RATE: 16 BRPM | HEIGHT: 68 IN | DIASTOLIC BLOOD PRESSURE: 77 MMHG | SYSTOLIC BLOOD PRESSURE: 115 MMHG | OXYGEN SATURATION: 98 % | BODY MASS INDEX: 30.23 KG/M2

## 2024-10-02 DIAGNOSIS — Z12.11 SPECIAL SCREENING FOR MALIGNANT NEOPLASMS, COLON: ICD-10-CM

## 2024-10-02 DIAGNOSIS — F41.1 GAD (GENERALIZED ANXIETY DISORDER): ICD-10-CM

## 2024-10-02 DIAGNOSIS — Z87.440 PERSONAL HISTORY OF URINARY TRACT INFECTION: ICD-10-CM

## 2024-10-02 DIAGNOSIS — F51.04 PSYCHOPHYSIOLOGICAL INSOMNIA: ICD-10-CM

## 2024-10-02 DIAGNOSIS — Z12.31 VISIT FOR SCREENING MAMMOGRAM: ICD-10-CM

## 2024-10-02 DIAGNOSIS — K21.00 GASTROESOPHAGEAL REFLUX DISEASE WITH ESOPHAGITIS WITHOUT HEMORRHAGE: Primary | ICD-10-CM

## 2024-10-02 DIAGNOSIS — E78.5 HYPERLIPIDEMIA LDL GOAL <100: ICD-10-CM

## 2024-10-02 LAB
ALBUMIN SERPL BCG-MCNC: 4.8 G/DL (ref 3.5–5.2)
ALBUMIN UR-MCNC: NEGATIVE MG/DL
ALP SERPL-CCNC: 96 U/L (ref 40–150)
ALT SERPL W P-5'-P-CCNC: 36 U/L (ref 0–50)
ANION GAP SERPL CALCULATED.3IONS-SCNC: 13 MMOL/L (ref 7–15)
APPEARANCE UR: CLEAR
AST SERPL W P-5'-P-CCNC: 31 U/L (ref 0–45)
BASOPHILS # BLD AUTO: 0.1 10E3/UL (ref 0–0.2)
BASOPHILS NFR BLD AUTO: 1 %
BILIRUB SERPL-MCNC: 0.4 MG/DL
BILIRUB UR QL STRIP: NEGATIVE
BUN SERPL-MCNC: 14.8 MG/DL (ref 8–23)
CALCIUM SERPL-MCNC: 9.7 MG/DL (ref 8.8–10.4)
CHLORIDE SERPL-SCNC: 101 MMOL/L (ref 98–107)
COLOR UR AUTO: NORMAL
CREAT SERPL-MCNC: 0.85 MG/DL (ref 0.51–0.95)
EGFRCR SERPLBLD CKD-EPI 2021: 78 ML/MIN/1.73M2
EOSINOPHIL # BLD AUTO: 0.2 10E3/UL (ref 0–0.7)
EOSINOPHIL NFR BLD AUTO: 3 %
ERYTHROCYTE [DISTWIDTH] IN BLOOD BY AUTOMATED COUNT: 13 % (ref 10–15)
GLUCOSE SERPL-MCNC: 88 MG/DL (ref 70–99)
GLUCOSE UR STRIP-MCNC: NEGATIVE MG/DL
HCO3 SERPL-SCNC: 25 MMOL/L (ref 22–29)
HCT VFR BLD AUTO: 42.3 % (ref 35–47)
HGB BLD-MCNC: 14.1 G/DL (ref 11.7–15.7)
HGB UR QL STRIP: NEGATIVE
IMM GRANULOCYTES # BLD: 0 10E3/UL
IMM GRANULOCYTES NFR BLD: 0 %
KETONES UR STRIP-MCNC: NEGATIVE MG/DL
LEUKOCYTE ESTERASE UR QL STRIP: NEGATIVE
LYMPHOCYTES # BLD AUTO: 2.2 10E3/UL (ref 0.8–5.3)
LYMPHOCYTES NFR BLD AUTO: 28 %
MCH RBC QN AUTO: 29.2 PG (ref 26.5–33)
MCHC RBC AUTO-ENTMCNC: 33.3 G/DL (ref 31.5–36.5)
MCV RBC AUTO: 88 FL (ref 78–100)
MONOCYTES # BLD AUTO: 0.6 10E3/UL (ref 0–1.3)
MONOCYTES NFR BLD AUTO: 8 %
NEUTROPHILS # BLD AUTO: 4.8 10E3/UL (ref 1.6–8.3)
NEUTROPHILS NFR BLD AUTO: 61 %
NITRATE UR QL: NEGATIVE
NRBC # BLD AUTO: 0 10E3/UL
NRBC BLD AUTO-RTO: 0 /100
PH UR STRIP: 7 [PH] (ref 4.7–8)
PLATELET # BLD AUTO: 274 10E3/UL (ref 150–450)
POTASSIUM SERPL-SCNC: 4 MMOL/L (ref 3.4–5.3)
PROT SERPL-MCNC: 7.5 G/DL (ref 6.4–8.3)
RBC # BLD AUTO: 4.83 10E6/UL (ref 3.8–5.2)
RBC URINE: <1 /HPF
SODIUM SERPL-SCNC: 139 MMOL/L (ref 135–145)
SP GR UR STRIP: 1.01 (ref 1–1.03)
SQUAMOUS EPITHELIAL: 0 /HPF
TSH SERPL DL<=0.005 MIU/L-ACNC: 2.56 UIU/ML (ref 0.3–4.2)
UROBILINOGEN UR STRIP-MCNC: NORMAL MG/DL
WBC # BLD AUTO: 7.9 10E3/UL (ref 4–11)
WBC URINE: 1 /HPF

## 2024-10-02 PROCEDURE — 99214 OFFICE O/P EST MOD 30 MIN: CPT | Performed by: PHYSICIAN ASSISTANT

## 2024-10-02 PROCEDURE — 80050 GENERAL HEALTH PANEL: CPT | Performed by: PHYSICIAN ASSISTANT

## 2024-10-02 PROCEDURE — 36415 COLL VENOUS BLD VENIPUNCTURE: CPT | Performed by: PHYSICIAN ASSISTANT

## 2024-10-02 PROCEDURE — 81001 URINALYSIS AUTO W/SCOPE: CPT | Performed by: PHYSICIAN ASSISTANT

## 2024-10-02 PROCEDURE — 83721 ASSAY OF BLOOD LIPOPROTEIN: CPT | Performed by: PHYSICIAN ASSISTANT

## 2024-10-02 NOTE — PROGRESS NOTES
"  Assessment & Plan     Gastroesophageal reflux disease with esophagitis without hemorrhage  Discussion on options.  Calcium absorption issues.  Can try Tums taking and take her PPI every other day.      Psychophysiological insomnia  She     JANEE (generalized anxiety disorder)  Much better since she [place] in [place].     Hyperlipidemia LDL goal <100  She will be seeing us back as needed labs will be done today.   - CBC with platelets and differential; Future  - Comprehensive metabolic panel (BMP + Alb, Alk Phos, ALT, AST, Total. Bili, TP); Future  - TSH with free T4 reflex; Future  - LDL cholesterol direct; Future    Personal history of urinary tract infection  Check URINE hx of UTI.   - Comprehensive metabolic panel (BMP + Alb, Alk Phos, ALT, AST, Total. Bili, TP); Future  - UA Macroscopic with reflex to Microscopic and Culture; Future          BMI  Estimated body mass index is 30.33 kg/m  as calculated from the following:    Height as of this encounter: 1.727 m (5' 8\").    Weight as of this encounter: 90.5 kg (199 lb 8 oz).   Weight management plan: Discussed healthy diet and exercise guidelines      See Patient Instructions    No follow-ups on file.    Nils Rock is a 59 year old, presenting for the following health issues:  Recheck Medication        10/2/2024     3:51 PM   Additional Questions   Roomed by helio ackerman   Accompanied by none         10/2/2024     3:51 PM   Patient Reported Additional Medications   Patient reports taking the following new medications none     HPI     Medication Followup of Protonix  Taking Medication as prescribed: states does take it every other day  Side Effects:  None  Medication Helping Symptoms:  yes        Review of Systems  Constitutional, HEENT, cardiovascular, pulmonary, gi and gu systems are negative, except as otherwise noted.      Objective    /77 (BP Location: Right arm, Patient Position: Sitting, Cuff Size: Adult Large)   Pulse 80   Temp 97  F (36.1 " " C) (Tympanic)   Resp 16   Ht 1.727 m (5' 8\")   Wt 90.5 kg (199 lb 8 oz)   LMP 07/12/2017   SpO2 98%   BMI 30.33 kg/m    Body mass index is 30.33 kg/m .  Physical Exam   GENERAL: alert and no distress  EYES: Eyes grossly normal to inspection, PERRL and conjunctivae and sclerae normal  HENT: ear canals and TM's normal, nose and mouth without ulcers or lesions  NECK: no adenopathy, no asymmetry, masses, or scars  RESP: lungs clear to auscultation - no rales, rhonchi or wheezes  CV: regular rate and rhythm, normal S1 S2, no S3 or S4, no murmur, click or rub, no peripheral edema  ABDOMEN: soft, nontender, no hepatosplenomegaly, no masses and bowel sounds normal  MS: no gross musculoskeletal defects noted, no edema  SKIN: no suspicious lesions or rashes  PSYCH: mentation appears normal, affect normal/bright    No results found for any visits on 10/02/24.        Signed Electronically by: DESTIN Elizabeth    "

## 2024-10-03 ENCOUNTER — TELEPHONE (OUTPATIENT)
Dept: MAMMOGRAPHY | Facility: HOSPITAL | Age: 59
End: 2024-10-03

## 2024-10-03 ENCOUNTER — ANCILLARY PROCEDURE (OUTPATIENT)
Dept: MAMMOGRAPHY | Facility: OTHER | Age: 59
End: 2024-10-03
Attending: PHYSICIAN ASSISTANT
Payer: COMMERCIAL

## 2024-10-03 DIAGNOSIS — Z12.31 VISIT FOR SCREENING MAMMOGRAM: ICD-10-CM

## 2024-10-03 PROCEDURE — 77067 SCR MAMMO BI INCL CAD: CPT | Mod: TC | Performed by: RADIOLOGY

## 2024-10-03 PROCEDURE — 77063 BREAST TOMOSYNTHESIS BI: CPT | Mod: TC | Performed by: RADIOLOGY

## 2024-10-04 LAB — LDLC SERPL DIRECT ASSAY-MCNC: 128 MG/DL

## 2024-10-14 NOTE — RESULT ENCOUNTER NOTE
Patient notified of results. Seen by patient Shweta Miltonulkner on 10/14/2024  6:46 AM  Aurea Deng LPN

## 2024-10-14 NOTE — RESULT ENCOUNTER NOTE
Your LDL cholesterol is up., I believe this has been addressed. Eat better and we will check in the spring. Not sure ifyou are getting down to [place].  I know we are not right now.  So try while you are still in [place] to eat a Mediterranean diet.

## 2024-10-29 LAB — NONINV COLON CA DNA+OCC BLD SCRN STL QL: NEGATIVE

## (undated) DEVICE — GOWN-SURG LG LVL 3 REINFORCED

## (undated) DEVICE — COVER-TABLE SHEET

## (undated) DEVICE — DRSG-SPONGE STERILE 8 X 4

## (undated) DEVICE — BLADE-SCALPEL #15

## (undated) DEVICE — STAPLER-SKIN 35 WIDE STAPLES

## (undated) DEVICE — TROCAR-5X100MM BLADED W/FIXATION

## (undated) DEVICE — DRSG-NON ADHERING 3 X 8 TELFA

## (undated) DEVICE — TUBING-INSUFFLATION/LAPAROFLATOR W/FILTER

## (undated) DEVICE — SUTURE-VICRYL 0 CT J358H

## (undated) DEVICE — CAUTERY PAD-POLYHESIVE II ADULT

## (undated) DEVICE — GOWN-SURG XL LVL 3 REINFORCED

## (undated) DEVICE — FORCEP-COLON C NEEDLE  SWING JAW  FB-220UA

## (undated) DEVICE — IRRIGATION-NACL 3000ML (BAG)

## (undated) DEVICE — DRSG-VAGINAL PACKING 2

## (undated) DEVICE — SYRINGE-3CC LUER LOCK

## (undated) DEVICE — APPLICATOR-CHLORAPREP 26ML TINTED CHG 2%+ 70% IPA-SURGICAL

## (undated) DEVICE — SPONGE-LAPAROTOMY PADS 18 X 18

## (undated) DEVICE — TRAY-SKIN PREP POVIDONE/IODINE

## (undated) DEVICE — SUTURE-VICRYL 3-0 SH J416H

## (undated) DEVICE — SOL-NACL 0.9% 1000ML

## (undated) DEVICE — SYRINGE-30CC SLIP TIP

## (undated) DEVICE — POUCH-INSTRUMENT 3 COMP 9-5/8 X 18 IN

## (undated) DEVICE — LIGHT HANDLE COVER

## (undated) DEVICE — WANDS-INSULSCAN

## (undated) DEVICE — IRRIGATION-H2O 1000ML

## (undated) DEVICE — MOUTHPIECE W/GUARD FOR ENDOSCOPY

## (undated) DEVICE — LUBRICANT JELLY 2OZ. TUBE

## (undated) DEVICE — CAUTERY-LAP L-HOOK

## (undated) DEVICE — DRAIN-JP BULB RESERVOIR 100CC

## (undated) DEVICE — NDL-25G 1-1/2" NON-SAFETY

## (undated) DEVICE — BDG-STRIPS EXTRA LARGE

## (undated) DEVICE — NDL-INSUFFLATION 120MM

## (undated) DEVICE — TUBING-SUCTION 20FT

## (undated) DEVICE — GLV-7.0 PROTEXIS PI CLASSIC LF/PF

## (undated) DEVICE — SCD SLEEVE-KNEE REG.

## (undated) DEVICE — CANISTER-SUCTION 2000CC

## (undated) DEVICE — LIGASURE-5MM BLUNT TIP LAPAROSCOPIC

## (undated) DEVICE — SUTURE-VICRYL 0 CT-2 POP-OFF J727D

## (undated) DEVICE — BAG-DECANTER

## (undated) DEVICE — IRRIGATION-NACL 1000ML

## (undated) DEVICE — SUCTION-IRRIGATION STRYKEFLOW II (STRYKER)

## (undated) DEVICE — DRSG-ISLAND 4IN X 5IN

## (undated) DEVICE — CATH TRAY-16FR METER W/STATLOCK LATEX]

## (undated) DEVICE — SCISSOR-ENDOPATH 5MM CURVED

## (undated) DEVICE — CATH-FOLEY-2 WAY 14FR-5CC

## (undated) DEVICE — PACK-LAP LAVH-CUSTOM

## (undated) DEVICE — Device

## (undated) DEVICE — SUTURE-VICRYL 2-0 CT J357H

## (undated) DEVICE — SUTURE-VICRYL 1 CTX J371H

## (undated) DEVICE — BLADE-SURG CLIPPER

## (undated) DEVICE — SUTURE-VICRYL 0 UR-6 J603H

## (undated) DEVICE — SURGICAL BINDER-ABDOMINAL 46-62

## (undated) DEVICE — CAUTERY-EXTENDED 6.5" BLADE

## (undated) DEVICE — SET-TUR Y-TYPE BLADDER IRRIGATION

## (undated) DEVICE — SUTURE-VICRYL 3-0 CT-2 J232H

## (undated) DEVICE — PRESSURE INFUSOR DISP. 3000CC

## (undated) DEVICE — LABEL-STERILE PREPRINTED FOR OR

## (undated) DEVICE — BDG-STAT STRIP

## (undated) DEVICE — DRAIN-JP 19F ROUND W/TROCAR

## (undated) DEVICE — TOWEL-OR DISP 4PKS

## (undated) RX ORDER — GLYCOPYRROLATE 0.2 MG/ML
INJECTION, SOLUTION INTRAMUSCULAR; INTRAVENOUS
Status: DISPENSED
Start: 2017-08-23

## (undated) RX ORDER — FENTANYL CITRATE 50 UG/ML
INJECTION, SOLUTION INTRAMUSCULAR; INTRAVENOUS
Status: DISPENSED
Start: 2017-08-23

## (undated) RX ORDER — FUROSEMIDE 10 MG/ML
INJECTION INTRAMUSCULAR; INTRAVENOUS
Status: DISPENSED
Start: 2017-08-23

## (undated) RX ORDER — PROPOFOL 10 MG/ML
INJECTION, EMULSION INTRAVENOUS
Status: DISPENSED
Start: 2017-08-23

## (undated) RX ORDER — LIDOCAINE HYDROCHLORIDE 20 MG/ML
INJECTION, SOLUTION EPIDURAL; INFILTRATION; INTRACAUDAL; PERINEURAL
Status: DISPENSED
Start: 2017-08-23

## (undated) RX ORDER — DEXAMETHASONE SODIUM PHOSPHATE 10 MG/ML
INJECTION, SOLUTION INTRAMUSCULAR; INTRAVENOUS
Status: DISPENSED
Start: 2017-08-23

## (undated) RX ORDER — LIDOCAINE HYDROCHLORIDE 20 MG/ML
INJECTION, SOLUTION EPIDURAL; INFILTRATION; INTRACAUDAL; PERINEURAL
Status: DISPENSED
Start: 2018-03-05

## (undated) RX ORDER — PROPOFOL 10 MG/ML
INJECTION, EMULSION INTRAVENOUS
Status: DISPENSED
Start: 2018-03-05

## (undated) RX ORDER — FENTANYL CITRATE 50 UG/ML
INJECTION, SOLUTION INTRAMUSCULAR; INTRAVENOUS
Status: DISPENSED
Start: 2018-03-05

## (undated) RX ORDER — ONDANSETRON 2 MG/ML
INJECTION INTRAMUSCULAR; INTRAVENOUS
Status: DISPENSED
Start: 2017-08-23

## (undated) RX ORDER — NEOSTIGMINE METHYLSULFATE 5 MG/5 ML
SYRINGE (ML) INTRAVENOUS
Status: DISPENSED
Start: 2017-08-23

## (undated) RX ORDER — GLYCOPYRROLATE 0.2 MG/ML
INJECTION, SOLUTION INTRAMUSCULAR; INTRAVENOUS
Status: DISPENSED
Start: 2018-03-05

## (undated) RX ORDER — ALBUTEROL SULFATE 90 UG/1
AEROSOL, METERED RESPIRATORY (INHALATION)
Status: DISPENSED
Start: 2017-08-23